# Patient Record
Sex: FEMALE | Race: BLACK OR AFRICAN AMERICAN | Employment: UNEMPLOYED | URBAN - METROPOLITAN AREA
[De-identification: names, ages, dates, MRNs, and addresses within clinical notes are randomized per-mention and may not be internally consistent; named-entity substitution may affect disease eponyms.]

---

## 2017-01-17 ENCOUNTER — OFFICE VISIT (OUTPATIENT)
Dept: ONCOLOGY | Age: 57
End: 2017-01-17

## 2017-01-17 ENCOUNTER — HOSPITAL ENCOUNTER (OUTPATIENT)
Dept: ONCOLOGY | Age: 57
Discharge: HOME | End: 2017-01-17

## 2017-01-17 VITALS
DIASTOLIC BLOOD PRESSURE: 84 MMHG | HEIGHT: 64 IN | BODY MASS INDEX: 42.68 KG/M2 | SYSTOLIC BLOOD PRESSURE: 138 MMHG | WEIGHT: 250 LBS | TEMPERATURE: 98.1 F | HEART RATE: 77 BPM

## 2017-01-17 DIAGNOSIS — C50.911 INVASIVE DUCTAL CARCINOMA OF RIGHT BREAST, STAGE 1 (HCC): Primary | ICD-10-CM

## 2017-01-17 DIAGNOSIS — M19.90 ARTHRITIS: ICD-10-CM

## 2017-01-17 DIAGNOSIS — G62.9 NEUROPATHY: ICD-10-CM

## 2017-01-17 DIAGNOSIS — D05.10 DCIS (DUCTAL CARCINOMA IN SITU) OF BREAST, UNSPECIFIED LATERALITY: ICD-10-CM

## 2017-01-17 DIAGNOSIS — D05.12 DUCTAL CARCINOMA IN SITU (DCIS) OF LEFT BREAST: ICD-10-CM

## 2017-01-17 LAB
BASO+EOS+MONOS # BLD AUTO: 0.2 K/UL (ref 0–2.3)
BASO+EOS+MONOS # BLD AUTO: 3 % (ref 0.1–17)
DIFFERENTIAL METHOD BLD: ABNORMAL
ERYTHROCYTE [DISTWIDTH] IN BLOOD BY AUTOMATED COUNT: 13.6 % (ref 11.5–14.5)
HCT VFR BLD AUTO: 39.8 % (ref 36–48)
HGB BLD-MCNC: 12.6 G/DL (ref 12–16)
LYMPHOCYTES # BLD AUTO: 39 % (ref 14–44)
LYMPHOCYTES # BLD: 2.7 K/UL (ref 1.1–5.9)
MCH RBC QN AUTO: 24 PG (ref 25–35)
MCHC RBC AUTO-ENTMCNC: 31.7 G/DL (ref 31–37)
MCV RBC AUTO: 75.8 FL (ref 78–102)
NEUTS SEG # BLD: 4 K/UL (ref 1.8–9.5)
NEUTS SEG NFR BLD AUTO: 58 % (ref 40–70)
PLATELET # BLD AUTO: 287 K/UL (ref 140–440)
RBC # BLD AUTO: 5.25 M/UL (ref 4.1–5.1)
WBC # BLD AUTO: 6.9 K/UL (ref 4.5–13)

## 2017-01-17 NOTE — MR AVS SNAPSHOT
Visit Information Date & Time Provider Department Dept. Phone Encounter #  
 1/17/2017 11:15 AM Tad Byers 71 Office [phone] Follow-up Instructions Return in about 3 months (around 4/17/2017). Your Appointments 2/1/2017 10:30 AM  
Follow Up with MD JOHN Bravo [facility] ([facility]) Appt Note: ultrasound [address] [facility] 240 [id number] [address] [place] [address] 4/18/2017 10:45 AM  
Office Visit with MD Juan Byers [address] [place] [address] [facility]) Appt Note: OV  
 [facility] [address] [address]  
[phone]  
  
   
 [facility] [address] [facility] [address]  
  
    
  
 3/10/2017 10:00 AM  
TRANSITIONAL CARE MANAGEMENT with Lois Jo MD  
[facility]) Appt Note: 6 month f/up [address] [facility] [address]  
[facility] [address] Upcoming Health Maintenance Date Due Pneumococcal 19-64 Highest Risk (1 of 3 - PCV13) 1/11/1979 DTaP/Tdap/Td series (1 - Tdap) 1/11/1981 BREAST CANCER SCRN MAMMOGRAM 6/10/2016 INFLUENZA AGE 9 TO ADULT 8/1/2016 FOBT Q 1 YEAR AGE 50-75 3/8/2017 PAP AKA CERVICAL CYTOLOGY 12/10/2018 Allergies as of 1/17/2017  Review Complete On: 9/9/2016 By: Lois Jo MD  
 No Known Allergies Current Immunizations  Reviewed on 9/15/2015 Name Date Influenza Vaccine 9/15/2015, 11/18/2014 Not reviewed this visit You Were Diagnosed With   
  
 Codes Comments Invasive ductal carcinoma of right breast, stage 1 ([facility] [address]    -  Primary ICD-10-CM: C50.911 ICD-9-CM: 174.9 Ductal carcinoma in situ (DCIS) of left breast     ICD-10-CM: D05.12 
ICD-9-CM: 233.0 Neuropathy     ICD-10-CM: G62.9 ICD-9-CM: 355.9 Arthritis     ICD-10-CM: M19.90 ICD-9-CM: 716.90 Vitals BP Pulse Temp Height(growth percentile) Weight(growth percentile) BMI  
 138/84 77 98.1 °F (36.7 °C) 5' 4\" (1.626 m) 250 lb (113.4 kg) 42.91 kg/m2 OB Status Smoking Status Postmenopausal Former Smoker BMI and BSA Data Body Mass Index Body Surface Area 42.91 kg/m 2 2.26 m 2 Preferred Pharmacy Pharmacy Name Phone [facility] [address] [place], [address] [place] [address] Avenue [phone] Your Updated Medication List  
  
   
This list is accurate as of: 1/17/17 12:35 PM.  Always use your most recent med list.  
  
  
  
  
 gabapentin 100 mg capsule Commonly known as:  NEURONTIN Take 2 Caps by mouth three (3) times daily. ibuprofen 800 mg tablet Commonly known as:  MOTRIN Take 1 Tab by mouth three (3) times daily as needed for Pain. Iron 325 mg (65 mg iron) tablet Generic drug:  ferrous sulfate Take 325 mg by mouth Daily (before breakfast). Indications: IRON DEFICIENCY ANEMIA  
  
 lovastatin 20 mg tablet Commonly known as:  MEVACOR Take 1 Tab by mouth daily. oxyCODONE-acetaminophen 5-325 mg per tablet Commonly known as:  PERCOCET  
1 - 2 tablets every 4 -6 hours as needed for pain  
  
 triamterene-hydroCHLOROthiazide 37.5-25 mg per capsule Commonly known as:  Rachel Blocker Take 1 Cap by mouth daily. We Performed the Following COMPLETE CBC & AUTO DIFF WBC [76436 CPT(R)] Follow-up Instructions Return in about 3 months (around 4/17/2017). To-Do List   
 01/17/2017 Lab:  CA 27.29   
  
 01/17/2017 Lab:  CBC WITH 3 PART DIFF   
  
 01/17/2017 Lab:  METABOLIC PANEL, COMPREHENSIVE Introducing [facility]! Zehra Mackey introduces [employer] patient portal. Now you can access parts of your medical record, email your doctor's office, and request medication refills online. 1. In your internet browser, go to [email or web address] 2. Click on the First Time User? Click Here link in the Sign In box. You will see the New Member Sign Up page. 3. Enter your [employer] Access Code exactly as it appears below. You will not need to use this code after youve completed the sign-up process. If you do not sign up before the expiration date, you must request a new code. · [employer] Access Code: [email or web address] Expires: 3/5/2017 10:30 AM 
 
4. Enter the last four digits of your Social Security Number (xxxx) and Date of Birth (mm/dd/yyyy) as indicated and click Submit. You will be taken to the next sign-up page. 5. Create a Streamcore Systemt ID. This will be your [employer] login ID and cannot be changed, so think of one that is secure and easy to remember. 6. Create a [employer] password. You can change your password at any time. 7. Enter your Password Reset Question and Answer. This can be used at a later time if you forget your password. 8. Enter your e-mail address. You will receive e-mail notification when new information is available in [address] 9. Click Sign Up. You can now view and download portions of your medical record. 10. Click the Download Summary menu link to download a portable copy of your medical information. If you have questions, please visit the Frequently Asked Questions section of the [employer] website. Remember, [employer] is NOT to be used for urgent needs. For medical emergencies, dial 911. Now available from your iPhone and Android! Please provide this summary of care documentation to your next provider. Your primary care clinician is listed as Aquiles Luong. If you have any questions after today's visit, please call [phone].

## 2017-01-18 LAB
ALBUMIN SERPL-MCNC: 4 G/DL (ref 3.5–5.5)
ALBUMIN/GLOB SERPL: 1.1 {RATIO} (ref 1.1–2.5)
ALP SERPL-CCNC: 83 IU/L (ref 39–117)
ALT SERPL-CCNC: 15 IU/L (ref 0–32)
AST SERPL-CCNC: 20 IU/L (ref 0–40)
BILIRUB SERPL-MCNC: 0.5 MG/DL (ref 0–1.2)
BUN SERPL-MCNC: 12 MG/DL (ref 6–24)
BUN/CREAT SERPL: 12 (ref 9–23)
CALCIUM SERPL-MCNC: 9.6 MG/DL (ref 8.7–10.2)
CANCER AG27-29 SERPL-ACNC: 31.3 U/ML (ref 0–38.6)
CHLORIDE SERPL-SCNC: 97 MMOL/L (ref 96–106)
CO2 SERPL-SCNC: 25 MMOL/L (ref 18–29)
CREAT SERPL-MCNC: 1.03 MG/DL (ref 0.57–1)
GLOBULIN SER CALC-MCNC: 3.8 G/DL (ref 1.5–4.5)
GLUCOSE SERPL-MCNC: 96 MG/DL (ref 65–99)
POTASSIUM SERPL-SCNC: 3.8 MMOL/L (ref 3.5–5.2)
PROT SERPL-MCNC: 7.8 G/DL (ref 6–8.5)
SODIUM SERPL-SCNC: 138 MMOL/L (ref 134–144)

## 2017-02-01 ENCOUNTER — OFFICE VISIT (OUTPATIENT)
Dept: SURGERY | Age: 57
End: 2017-02-01

## 2017-02-01 VITALS
WEIGHT: 252 LBS | RESPIRATION RATE: 18 BRPM | SYSTOLIC BLOOD PRESSURE: 118 MMHG | HEART RATE: 79 BPM | BODY MASS INDEX: 43.02 KG/M2 | TEMPERATURE: 98.1 F | DIASTOLIC BLOOD PRESSURE: 74 MMHG | HEIGHT: 64 IN

## 2017-02-01 DIAGNOSIS — Z86.000 HISTORY OF DUCTAL CARCINOMA IN SITU (DCIS) OF BREAST: ICD-10-CM

## 2017-02-01 DIAGNOSIS — Z85.3 HISTORY OF RIGHT BREAST CANCER: Primary | ICD-10-CM

## 2017-02-01 NOTE — MR AVS SNAPSHOT
Visit Information Date & Time Provider Department Dept. Phone Encounter #  
 2/1/2017 10:30 AM Marianela Bee MD [address] [place] [address] [phone] Your Appointments 4/18/2017 10:45 AM  
Office Visit with Earnstine Bence, MD [address] [place] [address] [facility]) Appt Note: OV  
 [place] [address] [facility] 300 [facility] [address]  
[phone]  
  
   
 [place] [address] [facility] [address]  
  
    
 8/1/2017 11:30 AM  
Follow Up with Marianela Bee MD  
[address] [place] [address] ([facility]) Appt Note: 6mth F/U  
 [address] [place] [address] [facility] [address] [address]  
  
    
  
 3/10/2017 10:00 AM  
TRANSITIONAL CARE MANAGEMENT [facility] Appt Note: 6 month f/up [address] [facility] [address]  
[facility] [address] Upcoming Health Maintenance Date Due Pneumococcal 19-64 Highest Risk (1 of 3 - PCV13) 1/11/1979 DTaP/Tdap/Td series (1 - Tdap) 1/11/1981 BREAST CANCER SCRN MAMMOGRAM 6/10/2016 INFLUENZA AGE 9 TO ADULT 8/1/2016 FOBT Q 1 YEAR AGE 50-75 3/8/2017 PAP AKA CERVICAL CYTOLOGY 12/10/2018 Allergies as of 2/1/2017  Review Complete On: 2/1/2017 By: Marianela Bee MD  
 No Known Allergies Current Immunizations  Reviewed on 9/15/2015 Name Date Influenza Vaccine 9/15/2015, 11/18/2014 Not reviewed this visit Vitals BP Pulse Temp Resp Height(growth percentile) Weight(growth percentile) 118/74 79 98.1 °F (36.7 °C) (Oral) 18 5' 4\" (1.626 m) 252 lb (114.3 kg) BMI OB Status Smoking Status 43.26 kg/m2 Postmenopausal Former Smoker Vitals History BMI and BSA Data Body Mass Index Body Surface Area 43.26 kg/m 2 2.27 m 2 Preferred Pharmacy Pharmacy Name Phone [facility] [address] [phone] Your Updated Medication List  
  
   
This list is accurate as of: 2/1/17 11:33 AM.  Always use your most recent med list.  
  
  
  
  
 gabapentin 100 mg capsule Commonly known as:  NEURONTIN Take 2 Caps by mouth three (3) times daily. ibuprofen 800 mg tablet Commonly known as:  MOTRIN Take 1 Tab by mouth three (3) times daily as needed for Pain. Iron 325 mg (65 mg iron) tablet Generic drug:  ferrous sulfate Take 325 mg by mouth Daily (before breakfast). Indications: IRON DEFICIENCY ANEMIA  
  
 lovastatin 20 mg tablet Commonly known as:  MEVACOR Take 1 Tab by mouth daily. oxyCODONE-acetaminophen 5-325 mg per tablet Commonly known as:  PERCOCET  
1 - 2 tablets every 4 -6 hours as needed for pain  
  
 triamterene-hydroCHLOROthiazide 37.5-25 mg per capsule Commonly known as:  [place] Take 1 Cap by mouth daily. Introducing [facility]! Bell Rea introduces [employer] patient portal. Now you can access parts of your medical record, email your doctor's office, and request medication refills online. 1. In your internet browser, go to [email or web address] 2. Click on the First Time User? Click Here link in the Sign In box. You will see the New Member Sign Up page. 3. Enter your [employer] Access Code exactly as it appears below. You will not need to use this code after youve completed the sign-up process. If you do not sign up before the expiration date, you must request a new code. · [employer] Access Code: [email or web address] Expires: 3/5/2017 10:30 AM 
 
4. Enter the last four digits of your Social Security Number (xxxx) and Date of Birth (mm/dd/yyyy) as indicated and click Submit. You will be taken to the next sign-up page. 5. Create a Ele.me ID. This will be your Ele.me login ID and cannot be changed, so think of one that is secure and easy to remember. 6. Create a Ele.me password. You can change your password at any time. 7. Enter your Password Reset Question and Answer. This can be used at a later time if you forget your password. 8. Enter your e-mail address. You will receive e-mail notification when new information is available in [address] 9. Click Sign Up. You can now view and download portions of your medical record. 10. Click the Download Summary menu link to download a portable copy of your medical information. If you have questions, please visit the Frequently Asked Questions section of the Ele.me website. Remember, [employer]me is NOT to be used for urgent needs. For medical emergencies, dial 911. Now available from your iPhone and Android! Please provide this summary of care documentation to your next provider. Your primary care clinician is listed as Mariely Baptiste. If you have any questions after today's visit, please call [phone].

## 2017-02-01 NOTE — PROGRESS NOTES
John Ramirez M.D. FACS  PROGRESS NOTE    Name: Varun Kelly MRN: [id number]   : 1960 [facility]   Date: 2017 Admission Date: No admission date for patient encounter. Subjective:  Patient returns today 2-1/2 years out from bilateral mastectomies with immediate reconstruction for right breast cancer and left breast DCIS. The expanders became infected and had to be removed. The patient then opted to go without further reconstruction. She has completed adjuvant chemotherapy. She denies any unintentional weight loss, chest pain, new cough or difficulties breathing or abdominal pain since her last visit. She does have some soreness in the right latissimus dorsal muscle. Objective:  Vitals:    17 1101   BP: 118/74   Pulse: 79   Resp: 18   Temp: 98.1 °F (36.7 °C)   TempSrc: Oral   Weight: 114.3 kg (252 lb)   Height: 5' 4\" (1.626 m)       Physical Exam:    General: in no apparent distress    BREASTS:    Left:  No dimpling, discoloration, nipple inversion or retractions. No axillary or supraclavicular lymphadenopathy. No mass   Right:  No dimpling, discoloration, nipple inversion or retractions. No axillary or supraclavicular lymphadenopathy. No mass      Labs:  No results found for this or any previous visit (from the past 24 hour(s)). Current Medications:  Current Outpatient Prescriptions   Medication Sig Dispense Refill    triamterene-hydroCHLOROthiazide (DYAZIDE) 37.5-25 mg per capsule Take 1 Cap by mouth daily. 90 Cap 1    lovastatin (MEVACOR) 20 mg tablet Take 1 Tab by mouth daily. 90 Tab 1    oxyCODONE-acetaminophen (PERCOCET) 5-325 mg per tablet 1 - 2 tablets every 4 -6 hours as needed for pain 120 Tab 0    gabapentin (NEURONTIN) 100 mg capsule Take 2 Caps by mouth three (3) times daily. 180 Cap 11    ibuprofen (MOTRIN) 800 mg tablet Take 1 Tab by mouth three (3) times daily as needed for Pain.  40 Tab 0    ferrous sulfate (IRON) 325 mg (65 mg iron) tablet Take 325 mg by mouth Daily (before breakfast). Indications: IRON DEFICIENCY ANEMIA         Chart and notes reviewed. Data reviewed. I have evaluated and examined the patient. IMPRESSION:   · Patient was stage II right breast cancer and stage 0 left breast cancer status post bilateral mastectomies adjuvant chemotherapy. No new findings on exam or in her history and review. PLAN:/DISCUSION:   · We will continue to see the patient every 6 months until she has completed 5 years follow-up.   · Follow-up in 6 months         Pascale Cosme MD

## 2017-03-10 ENCOUNTER — OFFICE VISIT (OUTPATIENT)
Dept: FAMILY MEDICINE CLINIC | Age: 57
End: 2017-03-10

## 2017-03-10 VITALS
WEIGHT: 252 LBS | OXYGEN SATURATION: 96 % | TEMPERATURE: 98.4 F | HEART RATE: 69 BPM | DIASTOLIC BLOOD PRESSURE: 81 MMHG | SYSTOLIC BLOOD PRESSURE: 123 MMHG | BODY MASS INDEX: 43.02 KG/M2 | HEIGHT: 64 IN | RESPIRATION RATE: 18 BRPM

## 2017-03-10 DIAGNOSIS — I10 ESSENTIAL HYPERTENSION: Primary | ICD-10-CM

## 2017-03-10 DIAGNOSIS — E78.2 MIXED HYPERLIPIDEMIA: Chronic | ICD-10-CM

## 2017-03-10 DIAGNOSIS — I10 ESSENTIAL HYPERTENSION: ICD-10-CM

## 2017-03-10 NOTE — PROGRESS NOTES
Chronic Illness Visit    Today's Date:  3/10/2017   Patient's Name: Jami Dooley   Patient's :  1960     History:     Chief Complaint   Patient presents with    Follow Up Chronic Condition     HTN and Cholesterol     Medication Refill     All medications        Jami Dooley is a 62 y.o. female presenting for a follow up of Chronic Illness. Hypertension/Hyperlipidemia    BP is well controlled today <140/80. Patients risk factors include: Obesity   Patient is not checking home BP   Reports compliance and denies side effects to medications   Daily exercise and diet are as follows: reports eating very little but is walking a couple of days a wekk  Weight is stable since the last visit  Takes the below meds regularly with no side effects.    Last LDL: 125 ()      Past Medical History:   Diagnosis Date    [facility])     right breast cancer     Hypercholesterolemia     Hypertension      Past Surgical History:   Procedure Laterality Date    HX BREAST RECONSTRUCTION  2014    BILATERAL BREAST RECONSTRUCTION WITH TISSUE EXPANDERS [facility]     [address]      HX CHOLECYSTECTOMY      HX GYN      fibroid tumors removed    HX MASTECTOMY  2014    BILATERAL BREAST MASTECTOMY SIMPLE [facility]    HX TUBAL LIGATION      HX VASCULAR ACCESS      left chest     Social History     Social History    Marital status:      Spouse name: N/A    Number of children: N/A    Years of education: N/A     Social History Main Topics    Smoking status: Former Smoker     Types: Cigarettes    Smokeless tobacco: Never Used      Comment: quit 2014    Alcohol use Yes      Comment: Socially    Drug use: No    Sexual activity: Not Asked     Other Topics Concern    None     Social History Narrative     Family History   Problem Relation Age of Onset    Hypertension Mother     Diabetes Father     Diabetes Daughter     Diabetes Son      No Known Allergies    Problem List:      Patient Active Problem List   Diagnosis Code    HTN (hypertension) I10    Hyperlipidemia E78.5    Breast lump N63    Abnormal finding on mammography, microcalcification R92.0    DCIS (ductal carcinoma in situ) of breast D05.10    Breast cancer ([facility] [address] C50.919    Status post partial mastectomy of both breasts Z90.13    Cellulitis L03.90    Back pain M54.9    Anemia D64.9    Mucositis K12.30    Arthritis M19.90    Neuropathy G62.9    Chemotherapy-induced peripheral neuropathy (HCC) G62.0, T45.1X5A    Ductal carcinoma in situ (DCIS) of left breast D05.12    Invasive ductal carcinoma of right breast, stage 1 (HCC) C50.911       Medications:     Current Outpatient Prescriptions   Medication Sig    triamterene-hydroCHLOROthiazide (DYAZIDE) 37.5-25 mg per capsule Take 1 Cap by mouth daily.  lovastatin (MEVACOR) 20 mg tablet Take 1 Tab by mouth daily.  oxyCODONE-acetaminophen (PERCOCET) 5-325 mg per tablet 1 - 2 tablets every 4 -6 hours as needed for pain    gabapentin (NEURONTIN) 100 mg capsule Take 2 Caps by mouth three (3) times daily.  ibuprofen (MOTRIN) 800 mg tablet Take 1 Tab by mouth three (3) times daily as needed for Pain.  ferrous sulfate (IRON) 325 mg (65 mg iron) tablet Take 325 mg by mouth Daily (before breakfast). Indications: IRON DEFICIENCY ANEMIA     No current facility-administered medications for this visit.           Constitutional: negative for fevers, chills, sweats and fatigue  Respiratory: negative for cough, sputum or wheezing  Cardiovascular: negative for chest pain, chest pressure/discomfort, dyspnea  Gastrointestinal: negative for nausea, vomiting, diarrhea, constipation and abdominal pain  Musculoskeletal:positive for arthralgias, negative for myalgias  Neurological: negative for headaches and dizziness  Behavioral/Psych: negative for anxiety and depression    Physical Assessment:   VS:    Visit Vitals    /81    Pulse 69    Temp 98.4 °F (36.9 °C) (Oral)    Resp 18    Ht 5' 4\" (1.626 m)    Wt 252 lb (114.3 kg)    SpO2 96%    BMI 43.26 kg/m2       Lab Results   Component Value Date/Time    Sodium 138 01/17/2017 12:40 PM    Potassium 3.8 01/17/2017 12:40 PM    Chloride 97 01/17/2017 12:40 PM    CO2 25 01/17/2017 12:40 PM    Anion gap 8 02/10/2015 02:20 PM    Glucose 96 01/17/2017 12:40 PM    BUN 12 01/17/2017 12:40 PM    Creatinine 1.03 01/17/2017 12:40 PM    BUN/Creatinine ratio 12 01/17/2017 12:40 PM    GFR est AA 70 01/17/2017 12:40 PM    GFR est non-AA 60 01/17/2017 12:40 PM    Calcium 9.6 01/17/2017 12:40 PM       General:   Well-groomed, obese, in no distress, pleasant, alert, appropriate and conversant. Mouth:  Good dentition, oropharynx WNL without membranes, exudates, petechiae or ulcers  Cardiovasc:   RRR, no MRG. Pulses 2+ and symmetric at distal extremities. Pulmonary:   Lungs clear bilaterally. Normal respiratory effort. Extremities:   no edema on the left ankle, no TTP bilateral calves. LEs warm and well-perfused. Neuro:   Alert and oriented, no focal deficits. No facial asymmetry noted. Psych:  No pressured speech or abnormal thought content        Assessment/Plan & Orders:       1. Essential hypertension    2. Mixed hyperlipidemia        Orders Placed This Encounter    LIPID PANEL       Hypertension   -BP is well controlled would like patient to continue current meds. Hyperlipidemia  -stable continue w/ statin. Reiterated the importance of patient completing Lipid panel. Labs ordered once again today.     Obesity stable since the last visit  counseled on obesity, risks of being obese, commercial diet plans, calorie counting, food journaling, exercise, follow up for weight checks       Follow up in 3-6 months    Bennett Powell MD  Family Medicine  3/10/2017  10:03 AM

## 2017-03-10 NOTE — PATIENT INSTRUCTIONS

## 2017-03-10 NOTE — MR AVS SNAPSHOT
Visit Information Date & Time Provider Department Dept. Phone Encounter #  
 3/10/2017 10:00 AM Anders Carbajal, [facility] [phone] [id number] Your Appointments 4/18/2017 10:45 AM  
Office Visit with MD Juan Santillan [address] Mehran Torres) Appt Note: OV  
 [place] [address] [address]  
[phone]  
  
   
 [address] [address] [facility] [address]  
  
    
 8/1/2017 11:30 AM  
Follow Up with MD JOHN Beauchamp [facility] (Mehran Torres) Appt Note: 6mth F/U  
 [address] [address]  
  
    
 9/29/2017 10:00 AM  
FOLLOW UP EXAM with Anders Carbajal MD  
[facility] Mehran Torres) Appt Note: 6 month f/u  
 [address] [facility] [address]  
[facility] [address] Upcoming Health Maintenance Date Due Pneumococcal 19-64 Highest Risk (1 of 3 - PCV13) 1/11/1979 DTaP/Tdap/Td series (1 - Tdap) 1/11/1981 FOBT Q 1 YEAR AGE 50-75 3/8/2017 PAP AKA CERVICAL CYTOLOGY 12/10/2018 BREAST CANCER SCRN MAMMOGRAM 3/10/2019 Allergies as of 3/10/2017  Review Complete On: 3/10/2017 By: Anders Carbajal MD  
 No Known Allergies Current Immunizations  Reviewed on 9/15/2015 Name Date Influenza Vaccine 9/15/2015, 11/18/2014 Not reviewed this visit You Were Diagnosed With   
  
 Codes Comments Essential hypertension    -  Primary ICD-10-CM: I10 
ICD-9-CM: 401.9 Mixed hyperlipidemia     ICD-10-CM: E78.2 ICD-9-CM: 272.2 Vitals BP Pulse Temp Resp Height(growth percentile) Weight(growth percentile) 123/81 69 98.4 °F (36.9 °C) (Oral) 18 5' 4\" (1.626 m) 252 lb (114.3 kg) SpO2 BMI OB Status Smoking Status 96% 43.26 kg/m2 Postmenopausal Former Smoker Vitals History BMI and BSA Data Body Mass Index Body Surface Area  
 43.26 kg/m 2 2.27 m 2 Preferred Pharmacy Pharmacy Name Phone [facility] [address] [phone] Your Updated Medication List  
  
   
This list is accurate as of: 3/10/17 10:25 AM.  Always use your most recent med list.  
  
  
  
  
 gabapentin 100 mg capsule Commonly known as:  NEURONTIN Take 2 Caps by mouth three (3) times daily. ibuprofen 800 mg tablet Commonly known as:  MOTRIN Take 1 Tab by mouth three (3) times daily as needed for Pain. Iron 325 mg (65 mg iron) tablet Generic drug:  ferrous sulfate Take 325 mg by mouth Daily (before breakfast). Indications: IRON DEFICIENCY ANEMIA  
  
 lovastatin 20 mg tablet Commonly known as:  MEVACOR Take 1 Tab by mouth daily. oxyCODONE-acetaminophen 5-325 mg per tablet Commonly known as:  PERCOCET  
1 - 2 tablets every 4 -6 hours as needed for pain  
  
 triamterene-hydroCHLOROthiazide 37.5-25 mg per capsule Commonly known as:  Jaycee Marcel Take 1 Cap by mouth daily. To-Do List   
 03/10/2017 Lab:  LIPID PANEL Patient Instructions High Cholesterol: Care Instructions Your Care Instructions Cholesterol is a type of fat in your blood. It is needed for many body functions, such as making new cells. Cholesterol is made by your body. It also comes from food you eat. High cholesterol means that you have too much of the fat in your blood. This raises your risk of a heart attack and stroke. LDL and HDL are part of your total cholesterol. LDL is the \"bad\" cholesterol. High LDL can raise your risk for heart disease, heart attack, and stroke. HDL is the \"good\" cholesterol. It helps clear bad cholesterol from the body. High HDL is linked with a lower risk of heart disease, heart attack, and stroke. Your cholesterol levels help your doctor find out your risk for having a heart attack or stroke. You and your doctor can talk about whether you need to lower your risk and what treatment is best for you. A heart-healthy lifestyle along with medicines can help lower your cholesterol and your risk. The way you choose to lower your risk will depend on how high your risk is for heart attack and stroke. It will also depend on how you feel about taking medicines. Follow-up care is a key part of your treatment and safety. Be sure to make and go to all appointments, and call your doctor if you are having problems. It's also a good idea to know your test results and keep a list of the medicines you take. How can you care for yourself at home? · Eat a variety of foods every day. Good choices include fruits, vegetables, whole grains (like oatmeal), dried beans and peas, nuts and seeds, soy products (like tofu), and fat-free or low-fat dairy products. · Replace butter, margarine, and hydrogenated or partially hydrogenated oils with olive and canola oils. (Canola oil margarine without trans fat is fine.) · Replace red meat with fish, poultry, and soy protein (like tofu). · Limit processed and packaged foods like chips, crackers, and cookies. · Bake, broil, or steam foods. Don't silverio them. · Be physically active. Get at least 30 minutes of exercise on most days of the week. Walking is a good choice. You also may want to do other activities, such as running, swimming, cycling, or playing tennis or team sports. · Stay at a healthy weight or lose weight by making the changes in eating and physical activity listed above. Losing just a small amount of weight, even 5 to 10 pounds, can reduce your risk for having a heart attack or stroke. · Do not smoke. When should you call for help? Watch closely for changes in your health, and be sure to contact your doctor if: 
· You need help making lifestyle changes. · You have questions about your medicine. Where can you learn more? Go to http://hanna-dharmesh.info/. Enter Y827 in the search box to learn more about \"High Cholesterol: Care Instructions. \" Current as of: January 27, 2016 Content Version: 11.1 © 5740-4035 [employer]. Care instructions adapted under license by [employer] (which disclaims liability or warranty for this information). If you have questions about a medical condition or this instruction, always ask your healthcare professional. [address] any warranty or liability for your use of this information. Introducing [facility]! [address] introduces X[employer] patient portal. Now you can access parts of your medical record, email your doctor's office, and request medication refills online. 1. In your internet browser, go to [email or web address] 2. Click on the First Time User? Click Here link in the Sign In box. You will see the New Member Sign Up page. 3. Enter your Xignite Access Code exactly as it appears below. You will not need to use this code after youve completed the sign-up process. If you do not sign up before the expiration date, you must request a new code. · Xignite Access Code: [email or web address] Expires: 6/8/2017 10:25 AM 
 
4. Enter the last four digits of your Social Security Number (xxxx) and Date of Birth (mm/dd/yyyy) as indicated and click Submit. You will be taken to the next sign-up page. 5. Create a Xignite ID. This will be your Xignite login ID and cannot be changed, so think of one that is secure and easy to remember. 6. Create a Xignite password. You can change your password at any time. 7. Enter your Password Reset Question and Answer. This can be used at a later time if you forget your password. 8. Enter your e-mail address. You will receive e-mail notification when new information is available in [address] 9. Click Sign Up. You can now view and download portions of your medical record. 10. Click the Download Summary menu link to download a portable copy of your medical information. If you have questions, please visit the Frequently Asked Questions section of the [employer] website. Remember, [employer] is NOT to be used for urgent needs. For medical emergencies, dial 911. Now available from your iPhone and Android! Please provide this summary of care documentation to your next provider. Your primary care clinician is listed as Shahriar Setting. If you have any questions after today's visit, please call [phone].

## 2017-03-14 RX ORDER — GABAPENTIN 100 MG/1
200 CAPSULE ORAL 3 TIMES DAILY
Qty: 180 CAP | Refills: 11 | Status: SHIPPED | OUTPATIENT
Start: 2017-03-14 | End: 2017-03-23 | Stop reason: SDUPTHER

## 2017-03-17 RX ORDER — GABAPENTIN 100 MG/1
CAPSULE ORAL
Qty: 180 CAP | Refills: 0 | Status: SHIPPED | OUTPATIENT
Start: 2017-03-17 | End: 2017-06-13 | Stop reason: SDUPTHER

## 2017-03-23 RX ORDER — GABAPENTIN 100 MG/1
200 CAPSULE ORAL 3 TIMES DAILY
Qty: 180 CAP | Refills: 11 | Status: SHIPPED | OUTPATIENT
Start: 2017-03-23 | End: 2018-04-11 | Stop reason: SDUPTHER

## 2017-05-02 ENCOUNTER — HOSPITAL ENCOUNTER (OUTPATIENT)
Dept: ONCOLOGY | Age: 57
Discharge: HOME | End: 2017-05-02

## 2017-05-02 ENCOUNTER — OFFICE VISIT (OUTPATIENT)
Dept: ONCOLOGY | Age: 57
End: 2017-05-02

## 2017-05-02 VITALS
HEART RATE: 77 BPM | BODY MASS INDEX: 42.68 KG/M2 | DIASTOLIC BLOOD PRESSURE: 87 MMHG | WEIGHT: 250 LBS | HEIGHT: 64 IN | TEMPERATURE: 98.2 F | SYSTOLIC BLOOD PRESSURE: 153 MMHG

## 2017-05-02 DIAGNOSIS — M19.90 ARTHRITIS: ICD-10-CM

## 2017-05-02 DIAGNOSIS — C50.911 INVASIVE DUCTAL CARCINOMA OF RIGHT BREAST, STAGE 1 (HCC): ICD-10-CM

## 2017-05-02 DIAGNOSIS — C50.919 MALIGNANT NEOPLASM OF FEMALE BREAST, UNSPECIFIED LATERALITY, UNSPECIFIED SITE OF BREAST: Primary | ICD-10-CM

## 2017-05-02 DIAGNOSIS — C50.919 MALIGNANT NEOPLASM OF FEMALE BREAST, UNSPECIFIED LATERALITY, UNSPECIFIED SITE OF BREAST: ICD-10-CM

## 2017-05-02 LAB
BASO+EOS+MONOS # BLD AUTO: 0.3 K/UL (ref 0–2.3)
BASO+EOS+MONOS # BLD AUTO: 6 % (ref 0.1–17)
DIFFERENTIAL METHOD BLD: ABNORMAL
ERYTHROCYTE [DISTWIDTH] IN BLOOD BY AUTOMATED COUNT: 13.3 % (ref 11.5–14.5)
HCT VFR BLD AUTO: 39 % (ref 36–48)
HGB BLD-MCNC: 12.3 G/DL (ref 12–16)
LYMPHOCYTES # BLD AUTO: 39 % (ref 14–44)
LYMPHOCYTES # BLD: 2.4 K/UL (ref 1.1–5.9)
MCH RBC QN AUTO: 23.6 PG (ref 25–35)
MCHC RBC AUTO-ENTMCNC: 31.5 G/DL (ref 31–37)
MCV RBC AUTO: 74.7 FL (ref 78–102)
NEUTS SEG # BLD: 3.5 K/UL (ref 1.8–9.5)
NEUTS SEG NFR BLD AUTO: 55 % (ref 40–70)
PLATELET # BLD AUTO: 261 K/UL (ref 140–440)
RBC # BLD AUTO: 5.22 M/UL (ref 4.1–5.1)
WBC # BLD AUTO: 6.2 K/UL (ref 4.5–13)

## 2017-05-02 RX ORDER — OXYCODONE AND ACETAMINOPHEN 5; 325 MG/1; MG/1
TABLET ORAL
Qty: 120 TAB | Refills: 0 | Status: SHIPPED | OUTPATIENT
Start: 2017-05-02 | End: 2017-05-22

## 2017-05-02 NOTE — PATIENT INSTRUCTIONS
Breast Cancer: Care Instructions  Your Care Instructions  Breast cancer occurs when abnormal cells grow out of control in the breast. These cancer cells can spread within the breast, to nearby lymph nodes and other tissues, and to other parts of the body. Being treated for cancer can weaken your body, and you may feel very tired. Get the rest your body needs so you can feel better. Finding out that you have cancer is scary. You may feel many emotions and may need some help coping. Seek out family, friends, and counselors for support. You also can do things at home to make yourself feel better while you go through treatment. Call the [employer] ([phone]) or visit its website at [email or web address] (QEEP) for more information. Follow-up care is a key part of your treatment and safety. Be sure to make and go to all appointments, and call your doctor if you are having problems. It's also a good idea to know your test results and keep a list of the medicines you take. How can you care for yourself at home? · Take your medicines exactly as prescribed. Call your doctor if you think you are having a problem with your medicine. You may get medicine for nausea and vomiting if you have these side effects. · Follow your doctor's instructions to relieve pain. Pain from cancer and surgery can almost always be controlled. Use pain medicine when you first notice pain, before it becomes severe. · Eat healthy food. If you do not feel like eating, try to eat food that has protein and extra calories to keep up your strength and prevent weight loss. Drink liquid meal replacements for extra calories and protein. Try to eat your main meal early. · Get some physical activity every day, but do not get too tired. Keep doing the hobbies you enjoy as your energy allows. · Do not smoke. Smoking can make your cancer worse. If you need help quitting, talk to your doctor about stop-smoking programs and medicines.  These can increase your chances of quitting for good. · Take steps to control your stress and workload. Learn relaxation techniques. ¨ Share your feelings. Stress and tension affect our emotions. By expressing your feelings to others, you may be able to understand and cope with them. ¨ Consider joining a support group. Talking about a problem with your spouse, a good friend, or other people with similar problems is a good way to reduce tension and stress. ¨ Express yourself through art. Try writing, crafts, dance, or art to relieve stress. Some dance, writing, or art groups may be available just for people who have cancer. ¨ Be kind to your body and mind. Getting enough sleep, eating a healthy diet, and taking time to do things you enjoy can contribute to an overall feeling of balance in your life and can help reduce stress. ¨ Get help if you need it. Discuss your concerns with your doctor or counselor. · If you are vomiting or have diarrhea:  ¨ Drink plenty of fluids (enough so that your urine is light yellow or clear like water) to prevent dehydration. Choose water and other caffeine-free clear liquids. If you have kidney, heart, or liver disease and have to limit fluids, talk with your doctor before you increase the amount of fluids you drink. ¨ When you are able to eat, try clear soups, mild foods, and liquids until all symptoms are gone for 12 to 48 hours. Other good choices include dry toast, crackers, cooked cereal, and gelatin dessert, such as Jell-O.  · If you have not already done so, prepare a list of advance directives. Advance directives are instructions to your doctor and family members about what kind of care you want if you become unable to speak or express yourself. When should you call for help? Call your doctor now or seek immediate medical care if:  · You have a fever. · Any part of your breast becomes red, tender, swollen, or hot.   · You have pain, redness, or swelling in the arm on the same side as your breast cancer. Watch closely for changes in your health, and be sure to contact your doctor if:  · You have pain that is not controlled by medicine. · You have nausea or vomiting. · You are constipated or have diarrhea. Where can you learn more? Go to http://hanna-dharmesh.info/. Enter V321 in the search box to learn more about \"Breast Cancer: Care Instructions. \"  Current as of: July 26, 2016  Content Version: 11.2  © 4226-8676 [employer]. Care instructions adapted under license by [employer] (which disclaims liability or warranty for this information). If you have questions about a medical condition or this instruction, always ask your healthcare professional. [address] any warranty or liability for your use of this information.

## 2017-05-02 NOTE — PROGRESS NOTES
Hematology/Oncology  Progress Note    Name: Ricky Hoffmann  Date: 2017  : 1960    Kerry Gar MD     Ms. Dahlia Sheth is a 62 y.o. 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  Current therapy:the patient has previously completed adjuvant systemic chemotherapy      Subjective:     Ms. Dahlia Sheth is a 51-year-old  woman who has a history of DCIS involving the left breast an invasive ductal adenocarcinoma the right breast. The patient has done well since she completed her systemic chemotherapy. She has previous undergone bilateral mastectomies. She decided not to proceed with this reconstruction. She has no physical complaints at this time. Past medical history, family history, and social history: these were reviewed and remains unchanged. Past Medical History:   Diagnosis Date    Cancer [facility])     right breast cancer     Hypercholesterolemia     Hypertension      Past Surgical History:   Procedure Laterality Date    HX BREAST RECONSTRUCTION  2014    BILATERAL BREAST RECONSTRUCTION WITH TISSUE EXPANDERS performed by Dwayne Sandoval MD at [facility] MAIN OR    HX  SECTION      HX CHOLECYSTECTOMY      HX GYN      fibroid tumors removed    HX MASTECTOMY  2014    BILATERAL BREAST MASTECTOMY SIMPLE performed by Pricila Dooley MD at [facility] MAIN OR    HX TUBAL LIGATION      HX VASCULAR ACCESS      left chest     Social History     Social History    Marital status:      Spouse name: N/A    Number of children: N/A    Years of education: N/A     Occupational History    Not on file.      Social History Main Topics    Smoking status: Former Smoker     Types: Cigarettes    Smokeless tobacco: Never Used      Comment: quit 2014    Alcohol use Yes      Comment: Socially    Drug use: No    Sexual activity: Not on file     Other Topics Concern    Not on file     Social History Narrative     Family History Problem Relation Age of Onset    Hypertension Mother     Diabetes Father     Diabetes Daughter     Diabetes Son      Current Outpatient Prescriptions   Medication Sig Dispense Refill    gabapentin (NEURONTIN) 100 mg capsule Take 2 Caps by mouth three (3) times daily. 180 Cap 11    gabapentin (NEURONTIN) 100 mg capsule TAKE TWO CAPSULES BY MOUTH THREE TIMES DAILY 180 Cap 0    triamterene-hydroCHLOROthiazide (DYAZIDE) 37.5-25 mg per capsule Take 1 Cap by mouth daily. 90 Cap 1    lovastatin (MEVACOR) 20 mg tablet Take 1 Tab by mouth daily. 90 Tab 1    oxyCODONE-acetaminophen (PERCOCET) 5-325 mg per tablet 1 - 2 tablets every 4 -6 hours as needed for pain 120 Tab 0    ibuprofen (MOTRIN) 800 mg tablet Take 1 Tab by mouth three (3) times daily as needed for Pain. 40 Tab 0    ferrous sulfate (IRON) 325 mg (65 mg iron) tablet Take 325 mg by mouth Daily (before breakfast). Indications: IRON DEFICIENCY ANEMIA         Review of Systems  Constitutional: The patient has no acute distress or discomfort. HEENT: The patient denies recent head trauma, eye pain, blurred vision,  hearing deficit, oropharyngeal mucosal pain or lesions, and the patient denies throat pain or discomfort. Lymphatics: The patient denies palpable peripheral lymphadenopathy. Hematologic: The patient denies having bruising, bleeding, or progressive fatigue. Respiratory: Patient denies having shortness of breath, cough, sputum production, fever, or dyspnea on exertion. Cardiovascular: The patient denies having leg pain, leg swelling, heart palpitations, chest permit, chest pain, or lightheadedness. The patient denies having dyspnea on exertion. Gastrointestinal: The patient denies having nausea, emesis, or diarrhea. The patient denies having any hematemesis or blood in the stool. Genitourinary: Patient denies having urinary urgency, frequency, or dysuria. The patient denies having blood in the urine.   Psychological: The patient denies having symptoms of nervousness, anxiety, depression, or thoughts of harming self. Skin: Patient denies having skin rashes, skin, ulcerations, or unexplained itching or pruritus. Musculoskeletal: The patient denies having pain in the joints or bones. Objective:     Visit Vitals    /87    Pulse 77    Temp 98.2 °F (36.8 °C)    Wt 113.4 kg (250 lb)    BMI 42.91 kg/m2     ECOG PS=0, pain score=0/10   Physical Exam:   Gen. Appearance: The patient is in no acute distress. Skin: There is no bruise or rash. HEENT: The exam is unremarkable. Neck: Supple without lymphadenopathy or thyromegaly. Lungs: Clear to auscultation and percussion; there are no wheezes or rhonchi. Heart: Regular rate and rhythm; there are no murmurs, gallops, or rubs. Chest Wall and Axilla: No palpable masses, no evidence of disease recurrence. Abdomen: Bowel sounds are present and normal.  There is no guarding, tenderness, or hepatosplenomegaly. Extremities: There is no clubbing, cyanosis, or edema. Neurologic: There are no focal neurologic deficits. Lymphatics: There is no palpable peripheral lymphadenopathy. Musculoskeletal: The patient has full range of motion at all joints. There is no evidence of joint deformity or effusions. There is no focal joint tenderness. Psychological/psychiatric: There is no clinical evidence of anxiety, depression, or melancholy.     Lab data:      Results for orders placed or performed during the hospital encounter of 05/02/17   CBC WITH 3 PART DIFF     Status: Abnormal   Result Value Ref Range Status    WBC 6.2 4.5 - 13.0 K/uL Final    RBC 5.22 (H) 4.10 - 5.10 M/uL Final    HGB 12.3 12.0 - 16.0 g/dL Final    HCT 39.0 36 - 48 % Final    MCV 74.7 (L) 78 - 102 FL Final    MCH 23.6 (L) 25.0 - 35.0 PG Final    MCHC 31.5 31 - 37 g/dL Final    RDW 13.3 11.5 - 14.5 % Final    PLATELET 804 835 - 074 K/uL Final    NEUTROPHILS 55 40 - 70 % Final    MIXED CELLS 6 0.1 - 17 % Final    LYMPHOCYTES 39 14 - 44 % Final    ABS. NEUTROPHILS 3.5 1.8 - 9.5 K/UL Final    ABS. MIXED CELLS 0.3 0.0 - 2.3 K/uL Final    ABS. LYMPHOCYTES 2.4 1.1 - 5.9 K/UL Final     Comment: Test performed at [place] [address] Location. Results Reviewed by Medical Director. DF AUTOMATED   Final           Assessment:     1. Malignant neoplasm of female breast, unspecified laterality, unspecified site of breast ([address]    2. Invasive ductal carcinoma of right breast, stage 1 (HCC)    3. Arthritis      Plan:   DCIS left breast/invasive ductal carcinoma right breast the patient is doing well and clinically there is no evidence of disease recurrence. Have instructed the patient to continue doing her chest wall and axilla exam on a monthly basis. Chemotherapy-induced peripheral neuropathy: I have instructed the patient to continue taking the Neurontin 200 mg 3 times daily. She also takes Percocet 5 mg every 4-6 hours p.r.n. I will renew this Rx today. Additionally, she takes Motrin 800 mg 3 times daily with food. She will continue to take these medications as previously noted. Arthritis: I have instructed the patient to continue using her Motrin and exercises as needed for arthritis. She also takes the Percocet 5 mg every 4-6 hours as well and this offers some benefit. Follow-up in 3 months  Orders Placed This Encounter    COMPLETE CBC & AUTO DIFF WBC    InHouse CBC ([employer])     Standing Status:   Future     Number of Occurrences:   1     Standing Expiration Date:   5/9/2017       Geovanna Farrar MD  5/2/2017      Please note: This document has been produced using voice recognition software. Unrecognized errors in transcription may be present.

## 2017-05-02 NOTE — MR AVS SNAPSHOT
Visit Information Date & Time Provider Department Dept. Phone Encounter #  
 5/2/2017  3:15 PM Rakesh Chaudhary [address] Office [phone] [id number] Follow-up Instructions Return in about 3 months (around 8/2/2017). Your Appointments 8/1/2017 11:30 AM  
Follow Up with MD JOHN [facility] ([facility]) Appt Note: 6mth F/U  
 [address] [place] [address] [facility] [address] [id number]  
  
    
 8/8/2017 10:15 AM  
Office Visit with Rakesh Chaudhary MD  
[address] ([facility]) Appt Note: OV  
 [facility] [address] [facility] [address] [facility] [address]  
[phone]  
  
   
 [facility] [address]  
  
    
 9/29/2017 10:00 AM  
FOLLOW UP EXAM with Geneva Nance MD  
[facility]) Appt Note: 6 month f/u  
 [address] [facility] [address]  
[facility] [address] Upcoming Health Maintenance Date Due Pneumococcal 19-64 Highest Risk (1 of 3 - PCV13) 1/11/1979 DTaP/Tdap/Td series (1 - Tdap) 1/11/1981 FOBT Q 1 YEAR AGE 50-75 3/8/2017 INFLUENZA AGE 9 TO ADULT 8/1/2017 PAP AKA CERVICAL CYTOLOGY 12/10/2018 BREAST CANCER SCRN MAMMOGRAM 3/10/2019 Allergies as of 5/2/2017  Review Complete On: 5/2/2017 By: Rakesh Chaudhary MD  
 No Known Allergies Current Immunizations  Reviewed on 9/15/2015 Name Date Influenza Vaccine 9/15/2015, 11/18/2014 Not reviewed this visit You Were Diagnosed With   
  
 Codes Comments Malignant neoplasm of female breast, unspecified laterality, unspecified site of breast ([facility] [address]    -  Primary ICD-10-CM: C50.919 ICD-9-CM: 174.9 Invasive ductal carcinoma of right breast, stage 1 ([facility]     ICD-10-CM: C50.911 ICD-9-CM: 174.9 Arthritis     ICD-10-CM: M19.90 ICD-9-CM: 716.90 Vitals BP Pulse Temp Height(growth percentile) Weight(growth percentile) BMI  
 153/87 77 98.2 °F (36.8 °C) 5' 4\" (1.626 m) 250 lb (113.4 kg) 42.91 kg/m2 OB Status Smoking Status Postmenopausal Former Smoker Vitals History BMI and BSA Data Body Mass Index Body Surface Area 42.91 kg/m 2 2.26 m 2 Preferred Pharmacy Pharmacy Name [facility] [address] [place] [address] Avenue [phone] Your Updated Medication List  
  
   
This list is accurate as of: 17  4:25 PM.  Always use your most recent med list.  
  
  
  
  
 * gabapentin 100 mg capsule Commonly known as:  NEURONTIN  
TAKE TWO CAPSULES BY MOUTH THREE TIMES DAILY * gabapentin 100 mg capsule Commonly known as:  NEURONTIN Take 2 Caps by mouth three (3) times daily. ibuprofen 800 mg tablet Commonly known as:  MOTRIN Take 1 Tab by mouth three (3) times daily as needed for Pain. Iron 325 mg (65 mg iron) tablet Generic drug:  ferrous sulfate Take 325 mg by mouth Daily (before breakfast). Indications: IRON DEFICIENCY ANEMIA  
  
 lovastatin 20 mg tablet Commonly known as:  MEVACOR Take 1 Tab by mouth daily. oxyCODONE-acetaminophen 5-325 mg per tablet Commonly known as:  PERCOCET  
1 - 2 tablets every 4 -6 hours as needed for pain  
  
 triamterene-hydroCHLOROthiazide 37.5-25 mg per capsule Commonly known as:  Jeneen Sable Take 1 Cap by mouth daily. * Notice: This list has 2 medication(s) that are the same as other medications prescribed for you. Read the directions carefully, and ask your doctor or other care provider to review them with you. Prescriptions Printed Refills  
 oxyCODONE-acetaminophen (PERCOCET) 5-325 mg per tablet 0 Si - 2 tablets every 4 -6 hours as needed for pain  
 Class: Print We Performed the Following COMPLETE CBC & AUTO DIFF WBC [47524 CPT(R)] Follow-up Instructions Return in about 3 months (around 8/2/2017). To-Do List   
 05/02/2017 Lab:  CBC WITH 3 PART DIFF   
  
 05/03/2017 Lab:  CA 27.29   
  
 05/03/2017 Lab:  METABOLIC PANEL, COMPREHENSIVE Patient Instructions Breast Cancer: Care Instructions Your Care Instructions Breast cancer occurs when abnormal cells grow out of control in the breast. These cancer cells can spread within the breast, to nearby lymph nodes and other tissues, and to other parts of the body. Being treated for cancer can weaken your body, and you may feel very tired. Get the rest your body needs so you can feel better. Finding out that you have cancer is scary. You may feel many emotions and may need some help coping. Seek out family, friends, and counselors for support. You also can do things at home to make yourself feel better while you go through treatment. Call the [employer] (7-370.755.1771) or visit its website at [address] for more information. Follow-up care is a key part of your treatment and safety. Be sure to make and go to all appointments, and call your doctor if you are having problems. It's also a good idea to know your test results and keep a list of the medicines you take. How can you care for yourself at home? · Take your medicines exactly as prescribed. Call your doctor if you think you are having a problem with your medicine. You may get medicine for nausea and vomiting if you have these side effects. · Follow your doctor's instructions to relieve pain. Pain from cancer and surgery can almost always be controlled. Use pain medicine when you first notice pain, before it becomes severe. · Eat healthy food.  If you do not feel like eating, try to eat food that has protein and extra calories to keep up your strength and prevent weight loss. Drink liquid meal replacements for extra calories and protein. Try to eat your main meal early. · Get some physical activity every day, but do not get too tired. Keep doing the hobbies you enjoy as your energy allows. · Do not smoke. Smoking can make your cancer worse. If you need help quitting, talk to your doctor about stop-smoking programs and medicines. These can increase your chances of quitting for good. · Take steps to control your stress and workload. Learn relaxation techniques. ¨ Share your feelings. Stress and tension affect our emotions. By expressing your feelings to others, you may be able to understand and cope with them. ¨ Consider joining a support group. Talking about a problem with your spouse, a good friend, or other people with similar problems is a good way to reduce tension and stress. ¨ Express yourself through art. Try writing, crafts, dance, or art to relieve stress. Some dance, writing, or art groups may be available just for people who have cancer. ¨ Be kind to your body and mind. Getting enough sleep, eating a healthy diet, and taking time to do things you enjoy can contribute to an overall feeling of balance in your life and can help reduce stress. ¨ Get help if you need it. Discuss your concerns with your doctor or counselor. · If you are vomiting or have diarrhea: ¨ Drink plenty of fluids (enough so that your urine is light yellow or clear like water) to prevent dehydration. Choose water and other caffeine-free clear liquids. If you have kidney, heart, or liver disease and have to limit fluids, talk with your doctor before you increase the amount of fluids you drink. ¨ When you are able to eat, try clear soups, mild foods, and liquids until all symptoms are gone for 12 to 48 hours. Other good choices include dry toast, crackers, cooked cereal, and gelatin dessert, such as Jell-O. · If you have not already done so, prepare a list of advance directives. Advance directives are instructions to your doctor and family members about what kind of care you want if you become unable to speak or express yourself. When should you call for help? Call your doctor now or seek immediate medical care if: 
· You have a fever. · Any part of your breast becomes red, tender, swollen, or hot. · You have pain, redness, or swelling in the arm on the same side as your breast cancer. Watch closely for changes in your health, and be sure to contact your doctor if: 
· You have pain that is not controlled by medicine. · You have nausea or vomiting. · You are constipated or have diarrhea. Where can you learn more? Go to http://hanna-dharmesh.info/. Enter V321 in the search box to learn more about \"Breast Cancer: Care Instructions. \" Current as of: July 26, 2016 Content Version: 11.2 © 8086-6576 [employer]. Care instructions adapted under license by [employer] (which disclaims liability or warranty for this information). If you have questions about a medical condition or this instruction, always ask your healthcare professional. [address] any warranty or liability for your use of this information. Introducing [facility]! David Mathews introduces [employer] patient portal. Now you can access parts of your medical record, email your doctor's office, and request medication refills online. 1. In your internet browser, go to [email or web address] 2. Click on the First Time User? Click Here link in the Sign In box. You will see the New Member Sign Up page. 3. Enter your [employer] Access Code exactly as it appears below. You will not need to use this code after youve completed the sign-up process. If you do not sign up before the expiration date, you must request a new code. · [employer] Access Code: [email or web address] Expires: 6/8/2017 11:25 AM 
 
 4. Enter the last four digits of your Social Security Number (xxxx) and Date of Birth (mm/dd/yyyy) as indicated and click Submit. You will be taken to the next sign-up page. 5. Create a [employer] ID. This will be your [employer] login ID and cannot be changed, so think of one that is secure and easy to remember. 6. Create a [employer] password. You can change your password at any time. 7. Enter your Password Reset Question and Answer. This can be used at a later time if you forget your password. 8. Enter your e-mail address. You will receive e-mail notification when new information is available in [address] 9. Click Sign Up. You can now view and download portions of your medical record. 10. Click the Download Summary menu link to download a portable copy of your medical information. If you have questions, please visit the Frequently Asked Questions section of the [employer] website. Remember, [employer] is NOT to be used for urgent needs. For medical emergencies, dial 911. Now available from your iPhone and Android! Please provide this summary of care documentation to your next provider. Your primary care clinician is listed as Rich Diallo. If you have any questions after today's visit, please call [phone].

## 2017-05-03 LAB
ALBUMIN SERPL-MCNC: 4 G/DL (ref 3.5–5.5)
ALBUMIN/GLOB SERPL: 1.1 {RATIO} (ref 1.2–2.2)
ALP SERPL-CCNC: 85 IU/L (ref 39–117)
ALT SERPL-CCNC: 15 IU/L (ref 0–32)
AST SERPL-CCNC: 14 IU/L (ref 0–40)
BILIRUB SERPL-MCNC: 0.3 MG/DL (ref 0–1.2)
BUN SERPL-MCNC: 12 MG/DL (ref 6–24)
BUN/CREAT SERPL: 17 (ref 9–23)
CALCIUM SERPL-MCNC: 9.8 MG/DL (ref 8.7–10.2)
CANCER AG27-29 SERPL-ACNC: 30.8 U/ML (ref 0–38.6)
CHLORIDE SERPL-SCNC: 96 MMOL/L (ref 96–106)
CO2 SERPL-SCNC: 25 MMOL/L (ref 18–29)
CREAT SERPL-MCNC: 0.72 MG/DL (ref 0.57–1)
GLOBULIN SER CALC-MCNC: 3.8 G/DL (ref 1.5–4.5)
GLUCOSE SERPL-MCNC: 116 MG/DL (ref 65–99)
POTASSIUM SERPL-SCNC: 3.6 MMOL/L (ref 3.5–5.2)
PROT SERPL-MCNC: 7.8 G/DL (ref 6–8.5)
SODIUM SERPL-SCNC: 140 MMOL/L (ref 134–144)

## 2017-05-15 DIAGNOSIS — E78.5 HYPERLIPIDEMIA, UNSPECIFIED HYPERLIPIDEMIA TYPE: ICD-10-CM

## 2017-05-15 DIAGNOSIS — I10 ESSENTIAL HYPERTENSION WITH GOAL BLOOD PRESSURE LESS THAN 140/90: ICD-10-CM

## 2017-05-15 RX ORDER — TRIAMTERENE AND HYDROCHLOROTHIAZIDE 37.5; 25 MG/1; MG/1
1 CAPSULE ORAL DAILY
Qty: 90 CAP | Refills: 1 | Status: SHIPPED | OUTPATIENT
Start: 2017-05-15 | End: 2017-11-07 | Stop reason: SDUPTHER

## 2017-05-15 RX ORDER — LOVASTATIN 20 MG/1
20 TABLET ORAL DAILY
Qty: 90 TAB | Refills: 1 | Status: SHIPPED | OUTPATIENT
Start: 2017-05-15 | End: 2017-12-07 | Stop reason: SDUPTHER

## 2017-05-22 ENCOUNTER — APPOINTMENT (OUTPATIENT)
Dept: GENERAL RADIOLOGY | Age: 57
End: 2017-05-22
Attending: EMERGENCY MEDICINE
Payer: COMMERCIAL

## 2017-05-22 ENCOUNTER — DOCUMENTATION ONLY (OUTPATIENT)
Dept: FAMILY MEDICINE CLINIC | Age: 57
End: 2017-05-22

## 2017-05-22 ENCOUNTER — HOSPITAL ENCOUNTER (EMERGENCY)
Age: 57
Discharge: HOME | End: 2017-05-22
Attending: EMERGENCY MEDICINE
Payer: COMMERCIAL

## 2017-05-22 VITALS
WEIGHT: 237 LBS | DIASTOLIC BLOOD PRESSURE: 84 MMHG | RESPIRATION RATE: 14 BRPM | OXYGEN SATURATION: 100 % | TEMPERATURE: 97.9 F | HEART RATE: 76 BPM | HEIGHT: 64 IN | BODY MASS INDEX: 40.46 KG/M2 | SYSTOLIC BLOOD PRESSURE: 100 MMHG

## 2017-05-22 DIAGNOSIS — M79.602 ARM PAIN, DIFFUSE, LEFT: Primary | ICD-10-CM

## 2017-05-22 LAB
ALBUMIN SERPL BCP-MCNC: 3.6 G/DL (ref 3.4–5)
ALBUMIN/GLOB SERPL: 0.7 {RATIO} (ref 0.8–1.7)
ALP SERPL-CCNC: 89 U/L (ref 45–117)
ALT SERPL-CCNC: 25 U/L (ref 13–56)
ANION GAP BLD CALC-SCNC: 6 MMOL/L (ref 3–18)
AST SERPL W P-5'-P-CCNC: 23 U/L (ref 15–37)
ATRIAL RATE: 84 BPM
BASOPHILS # BLD AUTO: 0 K/UL (ref 0–0.06)
BASOPHILS # BLD: 0 % (ref 0–2)
BILIRUB SERPL-MCNC: 0.3 MG/DL (ref 0.2–1)
BNP SERPL-MCNC: 52 PG/ML (ref 0–900)
BUN SERPL-MCNC: 12 MG/DL (ref 7–18)
BUN/CREAT SERPL: 16 (ref 12–20)
CALCIUM SERPL-MCNC: 9.6 MG/DL (ref 8.5–10.1)
CALCULATED P AXIS, ECG09: 53 DEGREES
CALCULATED R AXIS, ECG10: -12 DEGREES
CALCULATED T AXIS, ECG11: 21 DEGREES
CHLORIDE SERPL-SCNC: 99 MMOL/L (ref 100–108)
CK MB CFR SERPL CALC: 0.5 % (ref 0–4)
CK MB CFR SERPL CALC: ABNORMAL % (ref 0–4)
CK MB SERPL-MCNC: 1.1 NG/ML (ref 5–25)
CK MB SERPL-MCNC: <1 NG/ML (ref 5–25)
CK SERPL-CCNC: 222 U/L (ref 26–192)
CK SERPL-CCNC: 241 U/L (ref 26–192)
CO2 SERPL-SCNC: 34 MMOL/L (ref 21–32)
CREAT SERPL-MCNC: 0.76 MG/DL (ref 0.6–1.3)
D DIMER PPP FEU-MCNC: 1.01 UG/ML(FEU)
DIAGNOSIS, 93000: NORMAL
DIFFERENTIAL METHOD BLD: ABNORMAL
EOSINOPHIL # BLD: 0.3 K/UL (ref 0–0.4)
EOSINOPHIL NFR BLD: 5 % (ref 0–5)
ERYTHROCYTE [DISTWIDTH] IN BLOOD BY AUTOMATED COUNT: 14 % (ref 11.6–14.5)
GLOBULIN SER CALC-MCNC: 5 G/DL (ref 2–4)
GLUCOSE SERPL-MCNC: 92 MG/DL (ref 74–99)
HCT VFR BLD AUTO: 39 % (ref 35–45)
HGB BLD-MCNC: 12.5 G/DL (ref 12–16)
LYMPHOCYTES # BLD AUTO: 39 % (ref 21–52)
LYMPHOCYTES # BLD: 2.2 K/UL (ref 0.9–3.6)
MAGNESIUM SERPL-MCNC: 2 MG/DL (ref 1.6–2.6)
MCH RBC QN AUTO: 23.8 PG (ref 24–34)
MCHC RBC AUTO-ENTMCNC: 32.1 G/DL (ref 31–37)
MCV RBC AUTO: 74.3 FL (ref 74–97)
MONOCYTES # BLD: 0.4 K/UL (ref 0.05–1.2)
MONOCYTES NFR BLD AUTO: 7 % (ref 3–10)
NEUTS SEG # BLD: 2.7 K/UL (ref 1.8–8)
NEUTS SEG NFR BLD AUTO: 49 % (ref 40–73)
P-R INTERVAL, ECG05: 132 MS
PLATELET # BLD AUTO: 264 K/UL (ref 135–420)
PLATELET COMMENTS,PCOM: ABNORMAL
PMV BLD AUTO: 10 FL (ref 9.2–11.8)
POTASSIUM SERPL-SCNC: 3 MMOL/L (ref 3.5–5.5)
PROT SERPL-MCNC: 8.6 G/DL (ref 6.4–8.2)
Q-T INTERVAL, ECG07: 386 MS
QRS DURATION, ECG06: 86 MS
QTC CALCULATION (BEZET), ECG08: 456 MS
RBC # BLD AUTO: 5.25 M/UL (ref 4.2–5.3)
RBC MORPH BLD: ABNORMAL
SODIUM SERPL-SCNC: 139 MMOL/L (ref 136–145)
TROPONIN I SERPL-MCNC: <0.02 NG/ML (ref 0–0.04)
TROPONIN I SERPL-MCNC: <0.02 NG/ML (ref 0–0.04)
VENTRICULAR RATE, ECG03: 84 BPM
WBC # BLD AUTO: 5.6 K/UL (ref 4.6–13.2)

## 2017-05-22 PROCEDURE — 80053 COMPREHEN METABOLIC PANEL: CPT | Performed by: EMERGENCY MEDICINE

## 2017-05-22 PROCEDURE — 82550 ASSAY OF CK (CPK): CPT | Performed by: EMERGENCY MEDICINE

## 2017-05-22 PROCEDURE — 99285 EMERGENCY DEPT VISIT HI MDM: CPT

## 2017-05-22 PROCEDURE — 85379 FIBRIN DEGRADATION QUANT: CPT | Performed by: EMERGENCY MEDICINE

## 2017-05-22 PROCEDURE — 93005 ELECTROCARDIOGRAM TRACING: CPT

## 2017-05-22 PROCEDURE — 93971 EXTREMITY STUDY: CPT

## 2017-05-22 PROCEDURE — 83880 ASSAY OF NATRIURETIC PEPTIDE: CPT | Performed by: EMERGENCY MEDICINE

## 2017-05-22 PROCEDURE — 71010 XR CHEST PORT: CPT

## 2017-05-22 PROCEDURE — 85025 COMPLETE CBC W/AUTO DIFF WBC: CPT | Performed by: EMERGENCY MEDICINE

## 2017-05-22 PROCEDURE — 83735 ASSAY OF MAGNESIUM: CPT | Performed by: EMERGENCY MEDICINE

## 2017-05-22 RX ORDER — OXYCODONE AND ACETAMINOPHEN 5; 325 MG/1; MG/1
1 TABLET ORAL
Qty: 10 TAB | Refills: 0 | Status: SHIPPED | OUTPATIENT
Start: 2017-05-22 | End: 2017-08-08 | Stop reason: SDUPTHER

## 2017-05-22 NOTE — ED PROVIDER NOTES
HPI Comments: Freya Fuentes  Is a 61-year-old female past medical history of hypertension high cholesterol and breast cancer status post bilateral mastectomy in 2014 who presents with left arm pain has been intermittent for the past 2 weeks. Pain is nonexertional and not associated with any nausea or vomiting. Patient denies any injury or trauma. Patient does note that she has also had unilateral leg swelling on the left which she has not had she states she underwent her chemo. No long trips or travel. No other aggravating or alleviating factors. No other associated symptoms. This document was created with voice recognition technology and unrecognized errors may be present. Patient is a 62 y.o. female presenting with arm pain. Arm Pain           Past Medical History:   Diagnosis Date    Cancer [facility])     right breast cancer     Hypercholesterolemia     Hypertension        Past Surgical History:   Procedure Laterality Date    HX BREAST RECONSTRUCTION  8/13/2014    BILATERAL BREAST RECONSTRUCTION WITH TISSUE EXPANDERS performed by Lisa Aly MD at [address] [place] [address]  [facility]      HX CHOLECYSTECTOMY      HX GYN      fibroid tumors removed    HX MASTECTOMY  8/13/2014    BILATERAL BREAST MASTECTOMY SIMPLE performed by Rubi Strange MD at [facility] MAIN OR    HX TUBAL LIGATION      HX VASCULAR ACCESS      left chest         Family History:   Problem Relation Age of Onset    Hypertension Mother     Diabetes Father     Diabetes Daughter     Diabetes Son        Social History     Social History    Marital status:      Spouse name: N/A    Number of children: N/A    Years of education: N/A     Occupational History    Not on file.      Social History Main Topics    Smoking status: Former Smoker     Types: Cigarettes    Smokeless tobacco: Never Used      Comment: quit 6/2014    Alcohol use Yes      Comment: Socially    Drug use: No    Sexual activity: Not on file Other Topics Concern    Not on file     Social History Narrative         ALLERGIES: Review of patient's allergies indicates no known allergies. Review of Systems   All other systems reviewed and are negative. Vitals:    05/22/17 1238   BP: (!) 160/99   Pulse: 93   Resp: 20   Temp: 97.9 °F (36.6 °C)   SpO2: 99%   Weight: 107.5 kg (237 lb)   Height: 5' 4\" (1.626 m)            Physical Exam   Constitutional: She is oriented to person, place, and time. She appears well-developed. HENT:   Head: Normocephalic and atraumatic. Eyes: EOM are normal. Pupils are equal, round, and reactive to light. Neck: Normal range of motion. Neck supple. Cardiovascular: Normal rate, regular rhythm and normal heart sounds. Exam reveals no friction rub. No murmur heard. Pulmonary/Chest: Effort normal and breath sounds normal. No respiratory distress. She has no wheezes. Abdominal: Soft. She exhibits no distension. There is no tenderness. There is no rebound and no guarding. Musculoskeletal: Normal range of motion. Neurological: She is alert and oriented to person, place, and time. Skin: Skin is warm and dry. Psychiatric: She has a normal mood and affect. Her behavior is normal. Thought content normal.        MDM  Number of Diagnoses or Management Options  Diagnosis management comments: 59-year-old female presents with left arm pain. Intermittent over the past 2 weeks nonexertional no associated nausea or vomiting no associated shortness of breath. Low suspicion of a cyst will check troponin ×2. Patient also notes that she has incidental finding of left leg swelling similar to when she was undergoing chemo. We will send a d-dimer and if it is positive duplex. EKG: Sinus at 84 normal axis normal intervals no ST elevation or depression or hypertrophy . Compared to EKG done October 2014 no change    Trop neg x2 will d/c heart <4; will send back to pcp. patient had a left arm.   I do not think this is a PE.,  Arm pain ca be a typical presentation for ACS benign bilaterally without being presentation  For PE.    ED Course       Procedures

## 2017-05-22 NOTE — PROCEDURES
[facility]  *** FINAL REPORT ***    Name: Kimmy Garcia  MRN: [id number]  : 1960  HIS Order #: [id number]  [id number] [facility] Visit #: [id number]  Date: 22 May 2017    TYPE OF TEST: Peripheral Venous Testing    REASON FOR TEST  Limb swelling    Left Leg:-  Deep venous thrombosis:           No  Superficial venous thrombosis:    No  Deep venous insufficiency:        Not examined  Superficial venous insufficiency: Not examined      INTERPRETATION/FINDINGS  Duplex images were obtained using 2-D gray scale, color flow, and  spectral Doppler analysis. Left leg :  1. No evidence of deep venous thrombosis detected in the veins  visualized. 2. Deep vein(s) visualized include the common femoral, femoral,  popliteal, posterior tibial, and peroneal veins. 3. No evidence of superficial thrombosis detected. 4. Superficial vein(s) visualized include the great saphenous vein at  the sapheno-femoral junction. 5. No evidence of deep vein thrombosis in the contralateral common  femoral vein. ADDITIONAL COMMENTS    I have personally reviewed the data relevant to the interpretation of  this  study. TECHNOLOGIST: Riya Son. Abdulaziz LI  Signed: 2017 04:11 PM    PHYSICIAN: Erica Costello MD  Signed: 2017 10:32 AM

## 2017-05-22 NOTE — PROGRESS NOTES
Received call from pt with c/o left shoulder and arm pain down to her elbow, pain started over the weekend has been taking Ibuprofen with no relief, pain is now noted down in her hand, pt has taken Percocet with no relief. Pt denies chest pain or any other symptoms, pt still advised to go to the ER for evaluation.

## 2017-06-13 ENCOUNTER — OFFICE VISIT (OUTPATIENT)
Dept: FAMILY MEDICINE CLINIC | Age: 57
End: 2017-06-13

## 2017-06-13 VITALS
OXYGEN SATURATION: 95 % | WEIGHT: 250 LBS | RESPIRATION RATE: 18 BRPM | TEMPERATURE: 98 F | HEART RATE: 74 BPM | BODY MASS INDEX: 42.68 KG/M2 | DIASTOLIC BLOOD PRESSURE: 69 MMHG | HEIGHT: 64 IN | SYSTOLIC BLOOD PRESSURE: 127 MMHG

## 2017-06-13 DIAGNOSIS — M54.2 NECK PAIN: ICD-10-CM

## 2017-06-13 DIAGNOSIS — M79.602 LEFT ARM PAIN: ICD-10-CM

## 2017-06-13 DIAGNOSIS — R20.0 NUMBNESS AND TINGLING IN LEFT ARM: Primary | ICD-10-CM

## 2017-06-13 DIAGNOSIS — R20.2 NUMBNESS AND TINGLING IN LEFT ARM: Primary | ICD-10-CM

## 2017-06-13 RX ORDER — IBUPROFEN 800 MG/1
800 TABLET ORAL
Qty: 60 TAB | Refills: 0 | Status: SHIPPED | OUTPATIENT
Start: 2017-06-13 | End: 2017-09-29 | Stop reason: ALTCHOICE

## 2017-06-13 RX ORDER — CYCLOBENZAPRINE HCL 5 MG
5 TABLET ORAL
Qty: 20 TAB | Refills: 0 | Status: SHIPPED | OUTPATIENT
Start: 2017-06-13 | End: 2017-09-29 | Stop reason: ALTCHOICE

## 2017-06-13 NOTE — PROGRESS NOTES
HISTORY OF PRESENT ILLNESS  Rasta Caldwell is a 62 y.o. female. 6/13/2017  11:20 AM    Chief Complaint   Patient presents with   [facility]     ER visit on 5/22/17 for left arm pain and tingling, stil going on today       HPI: Here today for complaints of left arm pain. PCP Dr Foreign Ibarra. Left upper arm pain started first and it worked its way into the left hand that has been happening over the last 2 months. No injuries or possible causes that she can think of. She reports that she has intermittent numbness and tingling throughout the whole arm, left thumb and pointer finger stay numb all the time. She has been taking PRN Percocet for the pain as prescribed by the ER- does help with symptoms. She reports some neck discomfort from time to time but always thought she slept wrong. Evaluated in ER on 5/22 for symptoms and had negative cardiac work up. Review of Systems   Constitutional: Negative for chills, fever and malaise/fatigue. Respiratory: Negative for cough, shortness of breath and wheezing. Cardiovascular: Negative for chest pain, palpitations and leg swelling. Gastrointestinal: Negative for abdominal pain, diarrhea, nausea and vomiting. Musculoskeletal: Positive for myalgias and neck pain. Negative for falls. Neurological: Positive for tingling and sensory change. Negative for dizziness and headaches.        PHQ Screening   PHQ over the last two weeks 6/13/2017   Little interest or pleasure in doing things Not at all   Feeling down, depressed or hopeless Not at all   Total Score PHQ 2 0         History  Past Medical History:   Diagnosis Date    Cancer [facility])     right breast cancer     Hypercholesterolemia     Hypertension        Past Surgical History:   Procedure Laterality Date    HX BREAST RECONSTRUCTION  8/13/2014    BILATERAL BREAST RECONSTRUCTION WITH TISSUE EXPANDERS performed by Emely Nascimento MD at [address] [place] [address]  [facility] CHOLECYSTECTOMY      HX GYN      fibroid tumors removed    HX MASTECTOMY  8/13/2014    BILATERAL BREAST MASTECTOMY SIMPLE performed by Violet Palacios MD at [facility] MAIN OR    HX TUBAL LIGATION      HX VASCULAR ACCESS      left chest       Social History     Social History    Marital status:      Spouse name: N/A    Number of children: N/A    Years of education: N/A     Occupational History    Not on file. Social History Main Topics    Smoking status: Former Smoker     Types: Cigarettes    Smokeless tobacco: Never Used      Comment: quit 6/2014    Alcohol use No      Comment: Socially    Drug use: No    Sexual activity: No     Other Topics Concern    Not on file     Social History Narrative       No Known Allergies    Current Outpatient Prescriptions   Medication Sig Dispense Refill    oxyCODONE-acetaminophen (PERCOCET) 5-325 mg per tablet Take 1 Tab by mouth every four (4) hours as needed for Pain. Max Daily Amount: 6 Tabs. 10 Tab 0    lovastatin (MEVACOR) 20 mg tablet Take 1 Tab by mouth daily. 90 Tab 1    triamterene-hydroCHLOROthiazide (DYAZIDE) 37.5-25 mg per capsule Take 1 Cap by mouth daily. 90 Cap 1    gabapentin (NEURONTIN) 100 mg capsule Take 2 Caps by mouth three (3) times daily. 180 Cap 11    ferrous sulfate (IRON) 325 mg (65 mg iron) tablet Take 325 mg by mouth Daily (before breakfast). Indications: IRON DEFICIENCY ANEMIA      ibuprofen (MOTRIN) 800 mg tablet Take 1 Tab by mouth three (3) times daily as needed for Pain. 40 Tab 0         Patient Care Team:  Patient Care Team:  Moni Suresh MD as PCP - General (Family Practice)  Violet Palacios MD as Surgeon (General Surgery)  Raynell Ahumada, RN as Nurse Elaine Velasquez MD (Oncology)        LABS:  Results for Marcial Joy (MRN [id number]) as of 6/13/2017 11:23   Ref.  Range 5/22/2017 13:10 5/22/2017 16:25   Sodium Latest Ref Range: 136 - 145 mmol/L 139    Potassium Latest Ref Range: 3.5 - 5.5 mmol/L 3.0 (L)    Chloride Latest Ref Range: 100 - 108 mmol/L 99 (L)    CO2 Latest Ref Range: 21 - 32 mmol/L 34 (H)    Anion gap Latest Ref Range: 3.0 - 18 mmol/L 6    Glucose Latest Ref Range: 74 - 99 mg/dL 92    BUN Latest Ref Range: 7.0 - 18 MG/DL 12    Creatinine Latest Ref Range: 0.6 - 1.3 MG/DL 0.76    BUN/Creatinine ratio Latest Ref Range: 12 - 20   16    Calcium Latest Ref Range: 8.5 - 10.1 MG/DL 9.6    Magnesium Latest Ref Range: 1.6 - 2.6 mg/dL 2.0    GFR est non-AA Latest Ref Range: >60 ml/min/1.73m2 >60    GFR est AA Latest Ref Range: >60 ml/min/1.73m2 >60    Bilirubin, total Latest Ref Range: 0.2 - 1.0 MG/DL 0.3    Protein, total Latest Ref Range: 6.4 - 8.2 g/dL 8.6 (H)    Albumin Latest Ref Range: 3.4 - 5.0 g/dL 3.6    Globulin Latest Ref Range: 2.0 - 4.0 g/dL 5.0 (H)    A-G Ratio Latest Ref Range: 0.8 - 1.7   0.7 (L)    ALT (SGPT) Latest Ref Range: 13 - 56 U/L 25    AST Latest Ref Range: 15 - 37 U/L 23    Alk. phosphatase Latest Ref Range: 45 - 117 U/L 89    CK Latest Ref Range: 26 - 192 U/L 241 (H) 222 (H)   CK-MB Index Latest Ref Range: 0.0 - 4.0 % Cannot be calulated 0.5   CK - MB Latest Ref Range: <3.6 ng/ml <1.0 1.1   Troponin-I, Qt. Latest Ref Range: 0.0 - 0.045 NG/ML <0.02 <0.02   NT pro-BNP Latest Ref Range: 0 - 900 PG/ML 52      Radiology:   Normal sinus rhythm   Normal ECG   When compared with ECG of 04-AUG-2014 13:54,   No significant change was found   Confirmed by Homero Berrios MD, --- ([id number]) on 5/22/2017 3:34:52 PM     Physical Exam   Constitutional: She is oriented to person, place, and time. She appears well-developed and well-nourished. No distress. Neck: Normal range of motion. Neck supple. Cardiovascular: Normal rate, regular rhythm and normal heart sounds. No murmur heard. Pulmonary/Chest: Effort normal and breath sounds normal. No respiratory distress. Abdominal: Soft. Bowel sounds are normal. There is no tenderness. Musculoskeletal: She exhibits no edema. Left shoulder: She exhibits pain.  She exhibits normal range of motion and no swelling. Cervical back: She exhibits pain. She exhibits normal range of motion and no swelling. Left upper arm: She exhibits no swelling. Left forearm: She exhibits tenderness. She exhibits no swelling. Left hand: She exhibits normal range of motion, normal capillary refill, no deformity and no swelling. Normal sensation noted. Normal strength noted. -entire left arm pain reported from left side of cervical spine to fingertips of left hand; also reported altered sensation   Neurological: She is alert and oriented to person, place, and time. No cranial nerve deficit. She exhibits normal muscle tone. Coordination normal.   Skin: Skin is warm and dry. Vitals:    06/13/17 1103   BP: 127/69   Pulse: 74   Resp: 18   Temp: 98 °F (36.7 °C)   TempSrc: Oral   SpO2: 95%   Weight: 250 lb (113.4 kg)   Height: 5' 4\" (1.626 m)   PainSc:   3   PainLoc: Arm       ASSESSMENT and [address] was seen today for hospital follow up. Diagnoses and all orders for this visit:    Numbness and tingling in left arm/ Left arm pain/ Neck pain  *Discussed with patient about possible causes for symptoms. Advised on C-spine DDD or other abnormalities that may be causing radicular symptoms in the arm. Recommend to further evaluate with xray and EMG- declines EMG at this time. Will do xray. *Already on Gabapentin and consider increasing in future if needed. Will do PRN muscle relaxer and NSAID. Consider steroid pack if needed. *Discussed with patient about symptoms that would require an ER visit. Patient understands symptoms that are an emergency and will go to the ER if they occur.   -     XR SPINE CERV PA LAT ODONT 3 V MAX; Future  -     cyclobenzaprine (FLEXERIL) 5 mg tablet; Take 1 Tab by mouth every twelve (12) hours as needed for Muscle Spasm(s). -     ibuprofen (MOTRIN) 800 mg tablet; Take 1 Tab by mouth three (3) times daily as needed for Pain.       *Plan of care reviewed with patient. Patient in agreement with plan and expresses understanding. All questions answered and patient encouraged to call or RTO if further questions or concerns. Follow-up Disposition:  Return if symptoms worsen or fail to improve.

## 2017-06-13 NOTE — PATIENT INSTRUCTIONS
Numbness and Tingling: Care Instructions  Your Care Instructions  Many things can cause numbness or tingling. Swelling may put pressure on a nerve. This could cause you to lose feeling or have a pins-and-needles sensation on part of your body. Nerves may be damaged from trauma, toxins, or diseases, such as diabetes or multiple sclerosis (MS). Sometimes, though, the cause is not clear. If there is no clear reason for your symptoms, and you are not having any other symptoms, your doctor may suggest watching and waiting for a while to see if the numbness or tingling goes away on its own. Your doctor may want you to have blood or nerve tests to find the cause of your symptoms. Follow-up care is a key part of your treatment and safety. Be sure to make and go to all appointments, and call your doctor if you are having problems. It's also a good idea to know your test results and keep a list of the medicines you take. How can you care for yourself at home? · If your doctor prescribes medicine, take it exactly as directed. Call your doctor if you think you are having a problem with your medicine. · If you have any swelling, put ice or a cold pack on the area for 10 to 20 minutes at a time. Put a thin cloth between the ice and your skin. When should you call for help? Call 911 anytime you think you may need emergency care. For example, call if:  · You have weakness, numbness, or tingling in both legs. · You lose bowel or bladder control. · You have symptoms of a stroke. These may include:  ¨ Sudden numbness, tingling, weakness, or loss of movement in your face, arm, or leg, especially on only one side of your body. ¨ Sudden vision changes. ¨ Sudden trouble speaking. ¨ Sudden confusion or trouble understanding simple statements. ¨ Sudden problems with walking or balance. ¨ A sudden, severe headache that is different from past headaches.   Watch closely for changes in your health, and be sure to contact your doctor if you have any problems, or if:  · You do not get better as expected. Where can you learn more? Go to http://hanna-dharmesh.info/. Enter A841 in the search box to learn more about \"Numbness and Tingling: Care Instructions. \"  Current as of: October 14, 2016  Content Version: 11.2  © 3717-5197 mymission2. Care instructions adapted under license by [employer] (which disclaims liability or warranty for this information). If you have questions about a medical condition or this instruction, always ask your healthcare professional. [place] [address] any warranty or liability for your use of this information.

## 2017-06-13 NOTE — MR AVS SNAPSHOT
Visit Information Date & Time Provider Department Dept. Phone Encounter #  
 6/13/2017 11:00 AM Amrita Easton, [address] [facility] [phone] [id number] Follow-up Instructions Return if symptoms worsen or fail to improve. Your Appointments 8/1/2017 11:30 AM  
Follow Up with MD JOHN Gray [facility] (Thedora Minder) Appt Note: 6mth F/U  
 [address] [place] [address] [facility] [address] [address]  
  
    
 8/8/2017 10:15 AM  
Office Visit with Didi Gregorio MD  
[address] (Thedora Minder) Appt Note: OV  
 [facility] [address] [facility] [address] [facility] [address]  
[phone]  
  
   
 [facility] [address]  
  
    
 9/29/2017 10:00 AM  
FOLLOW UP EXAM with Cheng Le MD  
[facility]) Appt Note: 6 month f/u  
 [address] [facility] [address]  
[facility] [address] Upcoming Health Maintenance Date Due Pneumococcal 19-64 Highest Risk (1 of 3 - PCV13) 1/11/1979 DTaP/Tdap/Td series (1 - Tdap) 1/11/1981 FOBT Q 1 YEAR AGE 50-75 3/8/2017 INFLUENZA AGE 9 TO ADULT 8/1/2017 PAP AKA CERVICAL CYTOLOGY 12/10/2018 BREAST CANCER SCRN MAMMOGRAM 3/10/2019 Allergies as of 6/13/2017  Review Complete On: 6/13/2017 By: Swati Ching LPN No Known Allergies Current Immunizations  Reviewed on 9/15/2015 Name Date Influenza Vaccine 9/15/2015, 11/18/2014 Not reviewed this visit You Were Diagnosed With   
  
 Codes Comments Numbness and tingling in left arm    -  Primary ICD-10-CM: R20.0, R20.2 ICD-9-CM: 782.0 Left arm pain     ICD-10-CM: M79.602 ICD-9-CM: 729.5 Vitals BP Pulse Temp Resp Height(growth percentile) Weight(growth percentile) 127/69 (BP 1 Location: Left arm, BP Patient Position: Sitting) 74 98 °F (36.7 °C) (Oral) 18 5' 4\" (1.626 m) 250 lb (113.4 kg) SpO2 BMI OB Status Smoking Status 95% 42.91 kg/m2 Postmenopausal Former Smoker BMI and BSA Data Body Mass Index Body Surface Area 42.91 kg/m 2 2.26 m 2 Preferred Pharmacy Pharmacy Name Phone [facility] [address] [place] [address] [phone] Your Updated Medication List  
  
   
This list is accurate as of: 6/13/17 11:34 AM.  Always use your most recent med list.  
  
  
  
  
 cyclobenzaprine 5 mg tablet Commonly known as:  FLEXERIL Take 1 Tab by mouth every twelve (12) hours as needed for Muscle Spasm(s). gabapentin 100 mg capsule Commonly known as:  NEURONTIN Take 2 Caps by mouth three (3) times daily. ibuprofen 800 mg tablet Commonly known as:  MOTRIN Take 1 Tab by mouth three (3) times daily as needed for Pain. Iron 325 mg (65 mg iron) tablet Generic drug:  ferrous sulfate Take 325 mg by mouth Daily (before breakfast). Indications: IRON DEFICIENCY ANEMIA  
  
 lovastatin 20 mg tablet Commonly known as:  MEVACOR Take 1 Tab by mouth daily. oxyCODONE-acetaminophen 5-325 mg per tablet Commonly known as:  PERCOCET Take 1 Tab by mouth every four (4) hours as needed for Pain. Max Daily Amount: 6 Tabs. triamterene-hydroCHLOROthiazide 37.5-25 mg per capsule Commonly known as:  [place] Take 1 Cap by mouth daily. Prescriptions Sent to Pharmacy Refills  
 cyclobenzaprine (FLEXERIL) 5 mg tablet 0 Sig: Take 1 Tab by mouth every twelve (12) hours as needed for Muscle Spasm(s). Class: Normal  
 Pharmacy: [id number] [facility] [address] Ph #: [phone] Route: Oral  
 ibuprofen (MOTRIN) 800 mg tablet 0 Sig: Take 1 Tab by mouth three (3) times daily as needed for Pain.   
 Class: Normal  
 Pharmacy: [id number] [place], [address] [place] [address] #: [phone] Route: Oral  
  
Follow-up Instructions Return if symptoms worsen or fail to improve. To-Do List   
 Around 06/14/2017 Imaging:  XR SPINE CERV PA LAT ODONT 3 V MAX Patient Instructions Numbness and Tingling: Care Instructions Your Care Instructions Many things can cause numbness or tingling. Swelling may put pressure on a nerve. This could cause you to lose feeling or have a pins-and-needles sensation on part of your body. Nerves may be damaged from trauma, toxins, or diseases, such as diabetes or multiple sclerosis (MS). Sometimes, though, the cause is not clear. If there is no clear reason for your symptoms, and you are not having any other symptoms, your doctor may suggest watching and waiting for a while to see if the numbness or tingling goes away on its own. Your doctor may want you to have blood or nerve tests to find the cause of your symptoms. Follow-up care is a key part of your treatment and safety. Be sure to make and go to all appointments, and call your doctor if you are having problems. It's also a good idea to know your test results and keep a list of the medicines you take. How can you care for yourself at home? · If your doctor prescribes medicine, take it exactly as directed. Call your doctor if you think you are having a problem with your medicine. · If you have any swelling, put ice or a cold pack on the area for 10 to 20 minutes at a time. Put a thin cloth between the ice and your skin. When should you call for help? Call 911 anytime you think you may need emergency care. For example, call if: 
· You have weakness, numbness, or tingling in both legs. · You lose bowel or bladder control. · You have symptoms of a stroke. These may include: 
¨ Sudden numbness, tingling, weakness, or loss of movement in your face, arm, or leg, especially on only one side of your body. ¨ Sudden vision changes. ¨ Sudden trouble speaking. ¨ Sudden confusion or trouble understanding simple statements. ¨ Sudden problems with walking or balance. ¨ A sudden, severe headache that is different from past headaches. Watch closely for changes in your health, and be sure to contact your doctor if you have any problems, or if: 
· You do not get better as expected. Where can you learn more? Go to http://hanna-dharmesh.info/. Enter W717 in the search box to learn more about \"Numbness and Tingling: Care Instructions. \" Current as of: October 14, 2016 Content Version: 11.2 © 2644-2301 [employer]. Care instructions adapted under license by [employer] (which disclaims liability or warranty for this information). If you have questions about a medical condition or this instruction, always ask your healthcare professional. [address] any warranty or liability for your use of this information. Introducing [facility]! Janet Sparks introduces TalkShoe patient portal. Now you can access parts of your medical record, email your doctor's office, and request medication refills online. 1. In your internet browser, go to [email or web address] 2. Click on the First Time User? Click Here link in the Sign In box. You will see the New Member Sign Up page. 3. Enter your TalkShoe Access Code exactly as it appears below. You will not need to use this code after youve completed the sign-up process. If you do not sign up before the expiration date, you must request a new code. · TalkShoe Access Code: [email or web address] Expires: 9/9/2017  8:06 PM 
 
4. Enter the last four digits of your Social Security Number (xxxx) and Date of Birth (mm/dd/yyyy) as indicated and click Submit. You will be taken to the next sign-up page. 5. Create a TalkShoe ID.  This will be your TalkShoe login ID and cannot be changed, so think of one that is secure and easy to remember. 6. Create a [employer] password. You can change your password at any time. 7. Enter your Password Reset Question and Answer. This can be used at a later time if you forget your password. 8. Enter your e-mail address. You will receive e-mail notification when new information is available in [address] 9. Click Sign Up. You can now view and download portions of your medical record. 10. Click the Download Summary menu link to download a portable copy of your medical information. If you have questions, please visit the Frequently Asked Questions section of the [employer] website. Remember, [employer] is NOT to be used for urgent needs. For medical emergencies, dial 911. Now available from your iPhone and Android! Please provide this summary of care documentation to your next provider. Your primary care clinician is listed as Jefferson Mendes. If you have any questions after today's visit, please call [phone].

## 2017-06-26 ENCOUNTER — HOSPITAL ENCOUNTER (OUTPATIENT)
Dept: GENERAL RADIOLOGY | Age: 57
Discharge: HOME | End: 2017-06-26
Payer: COMMERCIAL

## 2017-06-26 DIAGNOSIS — M79.602 LEFT ARM PAIN: ICD-10-CM

## 2017-06-26 DIAGNOSIS — M54.2 NECK PAIN: ICD-10-CM

## 2017-06-26 DIAGNOSIS — R20.0 NUMBNESS AND TINGLING IN LEFT ARM: ICD-10-CM

## 2017-06-26 DIAGNOSIS — R20.2 NUMBNESS AND TINGLING IN LEFT ARM: ICD-10-CM

## 2017-06-26 PROCEDURE — 72040 X-RAY EXAM NECK SPINE 2-3 VW: CPT

## 2017-08-01 ENCOUNTER — OFFICE VISIT (OUTPATIENT)
Dept: SURGERY | Age: 57
End: 2017-08-01

## 2017-08-01 VITALS
TEMPERATURE: 97.9 F | RESPIRATION RATE: 16 BRPM | SYSTOLIC BLOOD PRESSURE: 118 MMHG | DIASTOLIC BLOOD PRESSURE: 72 MMHG | WEIGHT: 251 LBS | BODY MASS INDEX: 42.85 KG/M2 | HEIGHT: 64 IN | HEART RATE: 78 BPM

## 2017-08-01 DIAGNOSIS — Z85.3 HISTORY OF RIGHT BREAST CANCER: Primary | ICD-10-CM

## 2017-08-01 NOTE — PROGRESS NOTES
[facility]  General Surgery    Subjective:  Patient with history of stage II right breast cancer stage 0 left breast cancer status post bilateral mastectomies and adjuvant chemotherapy doing well. It is been 3 years since her surgery. She has no complaints. Objective:  Vitals:    08/01/17 1039   BP: 118/72   Pulse: 78   Resp: 16   Temp: 97.9 °F (36.6 °C)   TempSrc: Oral   Weight: 113.9 kg (251 lb)   Height: 5' 4\" (1.626 m)       Physical Exam:    General: Awake and alert, oriented ×4, no apparent distress   Breasts:    Left: No dimpling, discoloration   No axillary or supraclavicular lymphadenopathy. No mass   Right: No dimpling, discoloration   No axillary or supraclavicular lymphadenopathy. No mass          Current Medications:  Current Outpatient Prescriptions   Medication Sig Dispense Refill    cyclobenzaprine (FLEXERIL) 5 mg tablet Take 1 Tab by mouth every twelve (12) hours as needed for Muscle Spasm(s). 20 Tab 0    ibuprofen (MOTRIN) 800 mg tablet Take 1 Tab by mouth three (3) times daily as needed for Pain. 60 Tab 0    oxyCODONE-acetaminophen (PERCOCET) 5-325 mg per tablet Take 1 Tab by mouth every four (4) hours as needed for Pain. Max Daily Amount: 6 Tabs. 10 Tab 0    lovastatin (MEVACOR) 20 mg tablet Take 1 Tab by mouth daily. 90 Tab 1    triamterene-hydroCHLOROthiazide (DYAZIDE) 37.5-25 mg per capsule Take 1 Cap by mouth daily. 90 Cap 1    gabapentin (NEURONTIN) 100 mg capsule Take 2 Caps by mouth three (3) times daily. 180 Cap 11    ferrous sulfate (IRON) 325 mg (65 mg iron) tablet Take 325 mg by mouth Daily (before breakfast). Indications: IRON DEFICIENCY ANEMIA         Chart and notes reviewed. Data reviewed. I have evaluated and examined the patient. Impression and plan:  Patient with history of stage II right breast cancer and stage 0 left breast cancer status post bilateral mastectomies in 2014 and adjuvant chemotherapy doing well.   Follow-up in 6 months for clinical breast exam.     Kathya Foote MD

## 2017-08-01 NOTE — MR AVS SNAPSHOT
Visit Information Date & Time Provider Department Dept. Phone Encounter #  
 8/1/2017 10:30 AM Isaías Dougherty  [address] [id number] Your Appointments 8/8/2017 10:15 AM  
Office Visit with Camila Bolton MD  
[address] Appt Note: OV  
 [address] [facility] [address]  
[phone]  
  
   
 [address]  
  
    
 9/29/2017 10:00 AM  
FOLLOW UP EXAM with Livier Gardner MD  
Formerly [facility]) Appt Note: 6 month f/u  
 [address] [facility] [id number]  
[facility] [address] Upcoming Health Maintenance Date Due Pneumococcal 19-64 Highest Risk (1 of 3 - PCV13) 1/11/1979 DTaP/Tdap/Td series (1 - Tdap) 1/11/1981 FOBT Q 1 YEAR AGE 50-75 3/8/2017 INFLUENZA AGE 9 TO ADULT 8/1/2017 PAP AKA CERVICAL CYTOLOGY 12/10/2018 BREAST CANCER SCRN MAMMOGRAM 3/10/2019 Allergies as of 8/1/2017  Review Complete On: 8/1/2017 By: Isaías Dougherty MD  
 No Known Allergies Current Immunizations  Reviewed on 9/15/2015 Name Date Influenza Vaccine 9/15/2015, 11/18/2014 Not reviewed this visit You Were Diagnosed With   
  
 Codes Comments History of right breast cancer    -  Primary ICD-10-CM: Z85.3 ICD-9-CM: V10.3 Vitals BP Pulse Temp Resp Height(growth percentile) Weight(growth percentile) 118/72 78 97.9 °F (36.6 °C) (Oral) 16 5' 4\" (1.626 m) 251 lb (113.9 kg) BMI OB Status Smoking Status 43.08 kg/m2 Postmenopausal Former Smoker Vitals History BMI and BSA Data Body Mass Index Body Surface Area 43.08 kg/m 2 2.27 m 2 Preferred Pharmacy Pharmacy Name Phone [facility] [address] [place] [address] [phone] Your Updated Medication List  
  
 This list is accurate as of: 8/1/17 11:13 AM.  Always use your most recent med list.  
  
  
  
  
 cyclobenzaprine 5 mg tablet Commonly known as:  FLEXERIL Take 1 Tab by mouth every twelve (12) hours as needed for Muscle Spasm(s). gabapentin 100 mg capsule Commonly known as:  NEURONTIN Take 2 Caps by mouth three (3) times daily. ibuprofen 800 mg tablet Commonly known as:  MOTRIN Take 1 Tab by mouth three (3) times daily as needed for Pain. Iron 325 mg (65 mg iron) tablet Generic drug:  ferrous sulfate Take 325 mg by mouth Daily (before breakfast). Indications: IRON DEFICIENCY ANEMIA  
  
 lovastatin 20 mg tablet Commonly known as:  MEVACOR Take 1 Tab by mouth daily. oxyCODONE-acetaminophen 5-325 mg per tablet Commonly known as:  PERCOCET Take 1 Tab by mouth every four (4) hours as needed for Pain. Max Daily Amount: 6 Tabs. triamterene-hydroCHLOROthiazide 37.5-25 mg per capsule Commonly known as:  Gisell Dhaliwal Take 1 Cap by mouth daily. Introducing [facility]! Sergio Moyer introduces Setup patient portal. Now you can access parts of your medical record, email your doctor's office, and request medication refills online. 1. In your internet browser, go to [email or web address] 2. Click on the First Time User? Click Here link in the Sign In box. You will see the New Member Sign Up page. 3. Enter your Setup Access Code exactly as it appears below. You will not need to use this code after youve completed the sign-up process. If you do not sign up before the expiration date, you must request a new code. · Setup Access Code: [email or web address] Expires: 9/9/2017  8:06 PM 
 
4. Enter the last four digits of your Social Security Number (xxxx) and Date of Birth (mm/dd/yyyy) as indicated and click Submit. You will be taken to the next sign-up page. 5. Create a Setup ID.  This will be your Setup login ID and cannot be changed, so think of one that is secure and easy to remember. 6. Create a [employer] password. You can change your password at any time. 7. Enter your Password Reset Question and Answer. This can be used at a later time if you forget your password. 8. Enter your e-mail address. You will receive e-mail notification when new information is available in [address] 9. Click Sign Up. You can now view and download portions of your medical record. 10. Click the Download Summary menu link to download a portable copy of your medical information. If you have questions, please visit the Frequently Asked Questions section of the [employer] website. Remember, [employer] is NOT to be used for urgent needs. For medical emergencies, dial 911. Now available from your iPhone and Android! Please provide this summary of care documentation to your next provider. Your primary care clinician is listed as Edna Bahena. If you have any questions after today's visit, please call [phone].

## 2017-08-08 ENCOUNTER — OFFICE VISIT (OUTPATIENT)
Dept: ONCOLOGY | Age: 57
End: 2017-08-08

## 2017-08-08 ENCOUNTER — HOSPITAL ENCOUNTER (OUTPATIENT)
Dept: ONCOLOGY | Age: 57
Discharge: HOME | End: 2017-08-08

## 2017-08-08 VITALS
DIASTOLIC BLOOD PRESSURE: 85 MMHG | WEIGHT: 251 LBS | HEART RATE: 68 BPM | TEMPERATURE: 98.5 F | BODY MASS INDEX: 42.85 KG/M2 | HEIGHT: 64 IN | SYSTOLIC BLOOD PRESSURE: 141 MMHG

## 2017-08-08 DIAGNOSIS — C50.919 MALIGNANT NEOPLASM OF FEMALE BREAST, UNSPECIFIED LATERALITY, UNSPECIFIED SITE OF BREAST: ICD-10-CM

## 2017-08-08 DIAGNOSIS — C50.911 INVASIVE DUCTAL CARCINOMA OF RIGHT BREAST, STAGE 1 (HCC): ICD-10-CM

## 2017-08-08 DIAGNOSIS — C50.919 MALIGNANT NEOPLASM OF FEMALE BREAST, UNSPECIFIED LATERALITY, UNSPECIFIED SITE OF BREAST: Primary | ICD-10-CM

## 2017-08-08 DIAGNOSIS — M19.90 ARTHRITIS: ICD-10-CM

## 2017-08-08 DIAGNOSIS — T45.1X5A CHEMOTHERAPY-INDUCED PERIPHERAL NEUROPATHY (HCC): ICD-10-CM

## 2017-08-08 DIAGNOSIS — G62.0 CHEMOTHERAPY-INDUCED PERIPHERAL NEUROPATHY (HCC): ICD-10-CM

## 2017-08-08 LAB
BASO+EOS+MONOS # BLD AUTO: 0.6 K/UL (ref 0–2.3)
BASO+EOS+MONOS # BLD AUTO: 10 % (ref 0.1–17)
DIFFERENTIAL METHOD BLD: ABNORMAL
ERYTHROCYTE [DISTWIDTH] IN BLOOD BY AUTOMATED COUNT: 13.3 % (ref 11.5–14.5)
HCT VFR BLD AUTO: 39 % (ref 36–48)
HGB BLD-MCNC: 12.3 G/DL (ref 12–16)
LYMPHOCYTES # BLD AUTO: 37 % (ref 14–44)
LYMPHOCYTES # BLD: 2.4 K/UL (ref 1.1–5.9)
MCH RBC QN AUTO: 23.7 PG (ref 25–35)
MCHC RBC AUTO-ENTMCNC: 31.5 G/DL (ref 31–37)
MCV RBC AUTO: 75.3 FL (ref 78–102)
NEUTS SEG # BLD: 3.4 K/UL (ref 1.8–9.5)
NEUTS SEG NFR BLD AUTO: 53 % (ref 40–70)
PLATELET # BLD AUTO: 256 K/UL (ref 140–440)
RBC # BLD AUTO: 5.18 M/UL (ref 4.1–5.1)
WBC # BLD AUTO: 6.4 K/UL (ref 4.5–13)

## 2017-08-08 RX ORDER — OXYCODONE AND ACETAMINOPHEN 5; 325 MG/1; MG/1
1 TABLET ORAL
Qty: 10 TAB | Refills: 0 | Status: SHIPPED | OUTPATIENT
Start: 2017-08-08 | End: 2017-09-29 | Stop reason: ALTCHOICE

## 2017-08-08 NOTE — PROGRESS NOTES
Hematology/Oncology  Progress Note    Name: Ritchie Zhao  Date: 2017  : 1960    PCP: Danny Montes MD     Ms. Viry Ramirez is a 62 y.o.  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  Current therapy: The patient completed adjuvant systemic chemotherapy      Subjective:     Ms. Viry Ramirez is a 70-year-old  woman who has a history of DCIS involving the left breast and invasive ductal adenocarcinoma of the right breast. The patient has done well since she completed her systemic chemotherapy. She also underwent bilateral mastectomies. She decided not to proceed with reconstruction. She is requesting for a new prescription of her percocet. She has no physical complaints at this time. Past medical history, family history, and social history: these were reviewed and remains unchanged. Past Medical History:   Diagnosis Date    Cancer [facility])     right breast cancer     DDD (degenerative disc disease), cervical 2017    Hypercholesterolemia     Hypertension      Past Surgical History:   Procedure Laterality Date    HX BREAST RECONSTRUCTION  2014    BILATERAL BREAST RECONSTRUCTION WITH TISSUE EXPANDERS performed by Eleazar Duque MD at [facility] MAIN OR    HX  SECTION      HX CHOLECYSTECTOMY      HX GYN      fibroid tumors removed    HX MASTECTOMY  2014    BILATERAL BREAST MASTECTOMY SIMPLE performed by Idalia Hernandez MD at [facility] MAIN OR    HX TUBAL LIGATION      HX VASCULAR ACCESS      left chest     Social History     Social History    Marital status:      Spouse name: N/A    Number of children: N/A    Years of education: N/A     Occupational History    Not on file.      Social History Main Topics    Smoking status: Former Smoker     Types: Cigarettes    Smokeless tobacco: Never Used      Comment: quit 2014    Alcohol use No      Comment: Socially    Drug use: No    Sexual activity: No     Other Topics Concern    Not on file     Social History Narrative     Family History   Problem Relation Age of Onset    Hypertension Mother     Diabetes Father     Diabetes Daughter     Diabetes Son      Current Outpatient Prescriptions   Medication Sig Dispense Refill    oxyCODONE-acetaminophen (PERCOCET) 5-325 mg per tablet Take 1 Tab by mouth every four (4) hours as needed for Pain. Max Daily Amount: 6 Tabs. 10 Tab 0    cyclobenzaprine (FLEXERIL) 5 mg tablet Take 1 Tab by mouth every twelve (12) hours as needed for Muscle Spasm(s). 20 Tab 0    ibuprofen (MOTRIN) 800 mg tablet Take 1 Tab by mouth three (3) times daily as needed for Pain. 60 Tab 0    lovastatin (MEVACOR) 20 mg tablet Take 1 Tab by mouth daily. 90 Tab 1    triamterene-hydroCHLOROthiazide (DYAZIDE) 37.5-25 mg per capsule Take 1 Cap by mouth daily. 90 Cap 1    gabapentin (NEURONTIN) 100 mg capsule Take 2 Caps by mouth three (3) times daily. 180 Cap 11    ferrous sulfate (IRON) 325 mg (65 mg iron) tablet Take 325 mg by mouth Daily (before breakfast). Indications: IRON DEFICIENCY ANEMIA         Review of Systems  Constitutional: The patient has no acute distress or discomfort. HEENT: The patient denies recent head trauma, eye pain, blurred vision,  hearing deficit, oropharyngeal mucosal pain or lesions, and the patient denies throat pain or discomfort. Lymphatics: The patient denies palpable peripheral lymphadenopathy. Hematologic: The patient denies having bruising, bleeding, or progressive fatigue. Respiratory: Patient denies having shortness of breath, cough, sputum production, fever, or dyspnea on exertion. Cardiovascular: The patient denies having leg pain, leg swelling, heart palpitations, chest permit, chest pain, or lightheadedness. The patient denies having dyspnea on exertion. Gastrointestinal: The patient denies having nausea, emesis, or diarrhea.  The patient denies having any hematemesis or blood in the stool.  Genitourinary: Patient denies having urinary urgency, frequency, or dysuria. The patient denies having blood in the urine. Psychological: The patient denies having symptoms of nervousness, anxiety, depression, or thoughts of harming self. Skin: Patient denies having skin rashes, skin, ulcerations, or unexplained itching or pruritus. Musculoskeletal: The patient denies having pain in the joints or bones. Objective:     Visit Vitals    /85    Pulse 68    Temp 98.5 °F (36.9 °C)    Ht 5' 4\" (1.626 m)    Wt 113.9 kg (251 lb)    BMI 43.08 kg/m2     ECOG PS=0, pain score=0/10   Physical Exam:   Gen. Appearance: The patient is in no acute distress. Skin: There is no bruise or rash. HEENT: The exam is unremarkable. Neck: Supple without lymphadenopathy or thyromegaly. Lungs: Clear to auscultation and percussion; there are no wheezes or rhonchi. Heart: Regular rate and rhythm; there are no murmurs, gallops, or rubs. Chest Wall and Axilla: No palpable masses, no evidence of disease recurrence. Abdomen: Bowel sounds are present and normal.  There is no guarding, tenderness, or hepatosplenomegaly. Extremities: There is no clubbing, cyanosis, or edema. Neurologic: There are no focal neurologic deficits. Lymphatics: There is no palpable peripheral lymphadenopathy. Musculoskeletal: The patient has full range of motion at all joints. There is no evidence of joint deformity or effusions. There is no focal joint tenderness. Psychological/psychiatric: There is no clinical evidence of anxiety, depression, or melancholy.     Lab data:      Results for orders placed or performed during the hospital encounter of 08/08/17   CBC WITH 3 PART DIFF     Status: Abnormal   Result Value Ref Range Status    WBC 6.4 4.5 - 13.0 K/uL Final    RBC 5.18 (H) 4.10 - 5.10 M/uL Final    HGB 12.3 12.0 - 16.0 g/dL Final    HCT 39.0 36 - 48 % Final    MCV 75.3 (L) 78 - 102 FL Final    MCH 23.7 (L) 25.0 - 35.0 PG Final MCHC 31.5 31 - 37 g/dL Final    RDW 13.3 11.5 - 14.5 % Final    PLATELET 788 298 - 699 K/uL Final    NEUTROPHILS 53 40 - 70 % Final    MIXED CELLS 10 0.1 - 17 % Final    LYMPHOCYTES 37 14 - 44 % Final    ABS. NEUTROPHILS 3.4 1.8 - 9.5 K/UL Final    ABS. MIXED CELLS 0.6 0.0 - 2.3 K/uL Final    ABS. LYMPHOCYTES 2.4 1.1 - 5.9 K/UL Final     Comment: Test performed at [place] [address] Location. Results Reviewed by Medical Director. DF AUTOMATED   Final           Assessment:     1. Malignant neoplasm of female breast, unspecified laterality, unspecified site of breast ([facility] [address]    2. Invasive ductal carcinoma of right breast, stage 1 (HCC)    3. Chemotherapy-induced peripheral neuropathy ([facility] [address]    4. Arthritis      Plan:     DCIS left breast/invasive ductal carcinoma right breast the patient is doing well and clinically there is no evidence of disease recurrence. I have instructed the patient to continue doing her chest wall and axilla exam on a monthly basis. Chemotherapy-induced peripheral neuropathy: I have instructed the patient to continue taking the Neurontin 200 mg 3 times daily. She also takes Percocet 5 mg every 4-6 hours p.r.n. I will renew her Rx today. Additionally, she takes Motrin 800 mg 3 times daily with food. She will continue to take these medications as previously noted. Arthritis: I have instructed the patient to continue using her Motrin and exercises as needed for arthritis. She also takes the Percocet 5 mg every 4-6 hours as well and this offers some benefit. I plan to see the patient in 3 months or sooner if indicated.     Orders Placed This Encounter    COMPLETE CBC & AUTO DIFF WBC    InHouse CBC ([place])     Standing Status:   Future     Number of Occurrences:   1     Standing Expiration Date:   3/81/1843    METABOLIC PANEL, COMPREHENSIVE     Standing Status:   Future     Number of Occurrences:   1     Standing Expiration Date:   8/9/2018    CA 27.29     Standing Status: Future     Number of Occurrences:   1     Standing Expiration Date:   8/9/2018    oxyCODONE-acetaminophen (PERCOCET) 5-325 mg per tablet     Sig: Take 1 Tab by mouth every four (4) hours as needed for Pain. Max Daily Amount: 6 Tabs.      Dispense:  10 Tab     Refill:  0       Christy Hinojosa MD  8/8/2017

## 2017-08-08 NOTE — PATIENT INSTRUCTIONS
Breast Cancer: Care Instructions  Your Care Instructions  Breast cancer occurs when abnormal cells grow out of control in the breast. These cancer cells can spread within the breast, to nearby lymph nodes and other tissues, and to other parts of the body. Being treated for cancer can weaken your body, and you may feel very tired. Get the rest your body needs so you can feel better. Finding out that you have cancer is scary. You may feel many emotions and may need some help coping. Seek out family, friends, and counselors for support. You also can do things at home to make yourself feel better while you go through treatment. Call the [employer] (4-262.114.2330) or visit its website at [employer] for more information. Follow-up care is a key part of your treatment and safety. Be sure to make and go to all appointments, and call your doctor if you are having problems. It's also a good idea to know your test results and keep a list of the medicines you take. How can you care for yourself at home? · Take your medicines exactly as prescribed. Call your doctor if you think you are having a problem with your medicine. You may get medicine for nausea and vomiting if you have these side effects. · Follow your doctor's instructions to relieve pain. Pain from cancer and surgery can almost always be controlled. Use pain medicine when you first notice pain, before it becomes severe. · Eat healthy food. If you do not feel like eating, try to eat food that has protein and extra calories to keep up your strength and prevent weight loss. Drink liquid meal replacements for extra calories and protein. Try to eat your main meal early. · Get some physical activity every day, but do not get too tired. Keep doing the hobbies you enjoy as your energy allows. · Do not smoke. Smoking can make your cancer worse. If you need help quitting, talk to your doctor about stop-smoking programs and medicines.  These can increase your chances of quitting for good. · Take steps to control your stress and workload. Learn relaxation techniques. ¨ Share your feelings. Stress and tension affect our emotions. By expressing your feelings to others, you may be able to understand and cope with them. ¨ Consider joining a support group. Talking about a problem with your spouse, a good friend, or other people with similar problems is a good way to reduce tension and stress. ¨ Express yourself through art. Try writing, crafts, dance, or art to relieve stress. Some dance, writing, or art groups may be available just for people who have cancer. ¨ Be kind to your body and mind. Getting enough sleep, eating a healthy diet, and taking time to do things you enjoy can contribute to an overall feeling of balance in your life and can help reduce stress. ¨ Get help if you need it. Discuss your concerns with your doctor or counselor. · If you are vomiting or have diarrhea:  ¨ Drink plenty of fluids (enough so that your urine is light yellow or clear like water) to prevent dehydration. Choose water and other caffeine-free clear liquids. If you have kidney, heart, or liver disease and have to limit fluids, talk with your doctor before you increase the amount of fluids you drink. ¨ When you are able to eat, try clear soups, mild foods, and liquids until all symptoms are gone for 12 to 48 hours. Other good choices include dry toast, crackers, cooked cereal, and gelatin dessert, such as Jell-O.  · If you have not already done so, prepare a list of advance directives. Advance directives are instructions to your doctor and family members about what kind of care you want if you become unable to speak or express yourself. When should you call for help? Call your doctor now or seek immediate medical care if:  · You have a fever. · Any part of your breast becomes red, tender, swollen, or hot.   · You have pain, redness, or swelling in the arm on the same side as your breast cancer. Watch closely for changes in your health, and be sure to contact your doctor if:  · You have pain that is not controlled by medicine. · You have nausea or vomiting. · You are constipated or have diarrhea. Where can you learn more? Go to http://hanna-dharmesh.info/. Enter V321 in the search box to learn more about \"Breast Cancer: Care Instructions. \"  Current as of: July 26, 2016  Content Version: 11.3  © 6559-3357 [employer]. Care instructions adapted under license by [employer] (which disclaims liability or warranty for this information). If you have questions about a medical condition or this instruction, always ask your healthcare professional. [address] any warranty or liability for your use of this information.

## 2017-08-08 NOTE — MR AVS SNAPSHOT
Visit Information Date & Time Provider Department Dept. Phone Encounter #  
 8/8/2017 10:15 AM [facility] Office [phone] [id number] Follow-up Instructions Return in about 3 months (around 11/8/2017). Your Appointments 9/29/2017 10:00 AM  
FOLLOW UP EXAM with Sol Bhakta MD  
[facility] [address]) Appt Note: 6 month f/u  
 [address] [facility] [address]  
[facility] [address]  
  
    
 11/7/2017 11:30 AM  
Office Visit with Marcos Patel MD  
[place] [address] ([address]) Appt Note: OV  
 [place] [address] [facility] [address] [facility] [address]  
[phone]  
  
   
 [facility] [address] Upcoming Health Maintenance Date Due Pneumococcal 19-64 Highest Risk (1 of 3 - PCV13) 1/11/1979 DTaP/Tdap/Td series (1 - Tdap) 1/11/1981 FOBT Q 1 YEAR AGE 50-75 3/8/2017 INFLUENZA AGE 9 TO ADULT 8/1/2017 PAP AKA CERVICAL CYTOLOGY 12/10/2018 BREAST CANCER SCRN MAMMOGRAM 3/10/2019 Allergies as of 8/8/2017  Review Complete On: 8/1/2017 By: Swapnil Vivas MD  
 No Known Allergies Current Immunizations  Reviewed on 9/15/2015 Name Date Influenza Vaccine 9/15/2015, 11/18/2014 Not reviewed this visit You Were Diagnosed With   
  
 Codes Comments Malignant neoplasm of female breast, unspecified laterality, unspecified site of breast ([facility]    -  Primary ICD-10-CM: C50.919 ICD-9-CM: 174.9 Invasive ductal carcinoma of right breast, stage 1 ([facility]     ICD-10-CM: C50.911 ICD-9-CM: 174.9 Chemotherapy-induced peripheral neuropathy (HCC)     ICD-10-CM: G62.0, T45.1X5A 
ICD-9-CM: 357.7, E933.1 Arthritis     ICD-10-CM: M19.90 ICD-9-CM: 716.90 Vitals BP Pulse Temp Height(growth percentile) Weight(growth percentile) BMI 141/85 68 98.5 °F (36.9 °C) 5' 4\" (1.626 m) 251 lb (113.9 kg) 43.08 kg/m2 OB Status Smoking Status Postmenopausal Former Smoker BMI and BSA Data Body Mass Index Body Surface Area 43.08 kg/m 2 2.27 m 2 Preferred Pharmacy Pharmacy Name Phone [facility] [address], [address] [place] [address] Avenue [phone] Your Updated Medication List  
  
   
This list is accurate as of: 8/8/17 10:56 AM.  Always use your most recent med list.  
  
  
  
  
 cyclobenzaprine 5 mg tablet Commonly known as:  FLEXERIL Take 1 Tab by mouth every twelve (12) hours as needed for Muscle Spasm(s). gabapentin 100 mg capsule Commonly known as:  NEURONTIN Take 2 Caps by mouth three (3) times daily. ibuprofen 800 mg tablet Commonly known as:  MOTRIN Take 1 Tab by mouth three (3) times daily as needed for Pain. Iron 325 mg (65 mg iron) tablet Generic drug:  ferrous sulfate Take 325 mg by mouth Daily (before breakfast). Indications: IRON DEFICIENCY ANEMIA  
  
 lovastatin 20 mg tablet Commonly known as:  MEVACOR Take 1 Tab by mouth daily. oxyCODONE-acetaminophen 5-325 mg per tablet Commonly known as:  PERCOCET Take 1 Tab by mouth every four (4) hours as needed for Pain. Max Daily Amount: 6 Tabs. triamterene-hydroCHLOROthiazide 37.5-25 mg per capsule Commonly known as:  Feng Speak Take 1 Cap by mouth daily. Prescriptions Printed Refills  
 oxyCODONE-acetaminophen (PERCOCET) 5-325 mg per tablet 0 Sig: Take 1 Tab by mouth every four (4) hours as needed for Pain. Max Daily Amount: 6 Tabs. Class: Print Route: Oral  
  
We Performed the Following COMPLETE CBC & AUTO DIFF WBC [71402 CPT(R)] Follow-up Instructions Return in about 3 months (around 11/8/2017). To-Do List   
 08/08/2017 Lab:  CA 27.29   
  
 08/08/2017 Lab:  CBC WITH 3 PART DIFF   
  
 08/08/2017 Lab: METABOLIC PANEL, COMPREHENSIVE Patient Instructions Breast Cancer: Care Instructions Your Care Instructions Breast cancer occurs when abnormal cells grow out of control in the breast. These cancer cells can spread within the breast, to nearby lymph nodes and other tissues, and to other parts of the body. Being treated for cancer can weaken your body, and you may feel very tired. Get the rest your body needs so you can feel better. Finding out that you have cancer is scary. You may feel many emotions and may need some help coping. Seek out family, friends, and counselors for support. You also can do things at home to make yourself feel better while you go through treatment. Call the [employer] (2-449.221.6149) or visit its website at [employer] for more information. Follow-up care is a key part of your treatment and safety. Be sure to make and go to all appointments, and call your doctor if you are having problems. It's also a good idea to know your test results and keep a list of the medicines you take. How can you care for yourself at home? · Take your medicines exactly as prescribed. Call your doctor if you think you are having a problem with your medicine. You may get medicine for nausea and vomiting if you have these side effects. · Follow your doctor's instructions to relieve pain. Pain from cancer and surgery can almost always be controlled. Use pain medicine when you first notice pain, before it becomes severe. · Eat healthy food. If you do not feel like eating, try to eat food that has protein and extra calories to keep up your strength and prevent weight loss. Drink liquid meal replacements for extra calories and protein. Try to eat your main meal early. · Get some physical activity every day, but do not get too tired. Keep doing the hobbies you enjoy as your energy allows. · Do not smoke. Smoking can make your cancer worse.  If you need help quitting, talk to your doctor about stop-smoking programs and medicines. These can increase your chances of quitting for good. · Take steps to control your stress and workload. Learn relaxation techniques. ¨ Share your feelings. Stress and tension affect our emotions. By expressing your feelings to others, you may be able to understand and cope with them. ¨ Consider joining a support group. Talking about a problem with your spouse, a good friend, or other people with similar problems is a good way to reduce tension and stress. ¨ Express yourself through art. Try writing, crafts, dance, or art to relieve stress. Some dance, writing, or art groups may be available just for people who have cancer. ¨ Be kind to your body and mind. Getting enough sleep, eating a healthy diet, and taking time to do things you enjoy can contribute to an overall feeling of balance in your life and can help reduce stress. ¨ Get help if you need it. Discuss your concerns with your doctor or counselor. · If you are vomiting or have diarrhea: ¨ Drink plenty of fluids (enough so that your urine is light yellow or clear like water) to prevent dehydration. Choose water and other caffeine-free clear liquids. If you have kidney, heart, or liver disease and have to limit fluids, talk with your doctor before you increase the amount of fluids you drink. ¨ When you are able to eat, try clear soups, mild foods, and liquids until all symptoms are gone for 12 to 48 hours. Other good choices include dry toast, crackers, cooked cereal, and gelatin dessert, such as Jell-O. · If you have not already done so, prepare a list of advance directives. Advance directives are instructions to your doctor and family members about what kind of care you want if you become unable to speak or express yourself. When should you call for help? Call your doctor now or seek immediate medical care if: 
· You have a fever. · Any part of your breast becomes red, tender, swollen, or hot. · You have pain, redness, or swelling in the arm on the same side as your breast cancer. Watch closely for changes in your health, and be sure to contact your doctor if: 
· You have pain that is not controlled by medicine. · You have nausea or vomiting. · You are constipated or have diarrhea. Where can you learn more? Go to http://hanna-dharmesh.info/. Enter V321 in the search box to learn more about \"Breast Cancer: Care Instructions. \" Current as of: July 26, 2016 Content Version: 11.3 © 5141-8585 [employer]. Care instructions adapted under license by [employer] (which disclaims liability or warranty for this information). If you have questions about a medical condition or this instruction, always ask your healthcare professional. [address] any warranty or liability for your use of this information. Introducing [facility]! [employer] introduces [employer] patient portal. Now you can access parts of your medical record, email your doctor's office, and request medication refills online. 1. In your internet browser, go to [email or web address] 2. Click on the First Time User? Click Here link in the Sign In box. You will see the New Member Sign Up page. 3. Enter your [employer] Access Code exactly as it appears below. You will not need to use this code after youve completed the sign-up process. If you do not sign up before the expiration date, you must request a new code. · [employer] Access Code: [email or web address] Expires: 9/9/2017  8:06 PM 
 
4. Enter the last four digits of your Social Security Number (xxxx) and Date of Birth (mm/dd/yyyy) as indicated and click Submit. You will be taken to the next sign-up page. 5. Create a [employer] ID.  This will be your [employer] login ID and cannot be changed, so think of one that is secure and easy to remember. 6. Create a [employer] password. You can change your password at any time. 7. Enter your Password Reset Question and Answer. This can be used at a later time if you forget your password. 8. Enter your e-mail address. You will receive e-mail notification when new information is available in [address] 9. Click Sign Up. You can now view and download portions of your medical record. 10. Click the Download Summary menu link to download a portable copy of your medical information. If you have questions, please visit the Frequently Asked Questions section of the [employer] website. Remember, [employer] is NOT to be used for urgent needs. For medical emergencies, dial 911. Now available from your iPhone and Android! Please provide this summary of care documentation to your next provider. Your primary care clinician is listed as Herbie Sosa. If you have any questions after today's visit, please call [phone].

## 2017-08-09 LAB
ALBUMIN SERPL-MCNC: 4 G/DL (ref 3.5–5.5)
ALBUMIN/GLOB SERPL: 1.1 {RATIO} (ref 1.2–2.2)
ALP SERPL-CCNC: 81 IU/L (ref 39–117)
ALT SERPL-CCNC: 15 IU/L (ref 0–32)
AST SERPL-CCNC: 16 IU/L (ref 0–40)
BILIRUB SERPL-MCNC: 0.4 MG/DL (ref 0–1.2)
BUN SERPL-MCNC: 11 MG/DL (ref 6–24)
BUN/CREAT SERPL: 16 (ref 9–23)
CALCIUM SERPL-MCNC: 9.4 MG/DL (ref 8.7–10.2)
CANCER AG27-29 SERPL-ACNC: 23.4 U/ML (ref 0–38.6)
CHLORIDE SERPL-SCNC: 95 MMOL/L (ref 96–106)
CO2 SERPL-SCNC: 28 MMOL/L (ref 18–29)
CREAT SERPL-MCNC: 0.67 MG/DL (ref 0.57–1)
GLOBULIN SER CALC-MCNC: 3.7 G/DL (ref 1.5–4.5)
GLUCOSE SERPL-MCNC: 95 MG/DL (ref 65–99)
POTASSIUM SERPL-SCNC: 3.8 MMOL/L (ref 3.5–5.2)
PROT SERPL-MCNC: 7.7 G/DL (ref 6–8.5)
SODIUM SERPL-SCNC: 139 MMOL/L (ref 134–144)

## 2017-09-29 ENCOUNTER — OFFICE VISIT (OUTPATIENT)
Dept: FAMILY MEDICINE CLINIC | Age: 57
End: 2017-09-29

## 2017-09-29 VITALS
DIASTOLIC BLOOD PRESSURE: 68 MMHG | OXYGEN SATURATION: 98 % | TEMPERATURE: 99 F | HEIGHT: 64 IN | RESPIRATION RATE: 18 BRPM | HEART RATE: 71 BPM | SYSTOLIC BLOOD PRESSURE: 112 MMHG | BODY MASS INDEX: 43.54 KG/M2 | WEIGHT: 255 LBS

## 2017-09-29 DIAGNOSIS — Z23 ENCOUNTER FOR IMMUNIZATION: ICD-10-CM

## 2017-09-29 DIAGNOSIS — M19.90 ARTHRITIS: ICD-10-CM

## 2017-09-29 DIAGNOSIS — I10 ESSENTIAL HYPERTENSION: Primary | ICD-10-CM

## 2017-09-29 DIAGNOSIS — E78.2 MIXED HYPERLIPIDEMIA: Chronic | ICD-10-CM

## 2017-09-29 RX ORDER — MELOXICAM 15 MG/1
15 TABLET ORAL DAILY
Qty: 30 TAB | Refills: 3 | Status: SHIPPED | OUTPATIENT
Start: 2017-09-29 | End: 2018-01-29 | Stop reason: SDUPTHER

## 2017-09-29 NOTE — PROGRESS NOTES
Chronic Illness Visit    Today's Date:  2017   Patient's Name: Ashlie Edwards   Patient's :  1960     History:     Chief Complaint   Patient presents with    Follow Up Chronic Condition     HTN, doing well       Ashlie Edwards is a 62 y.o. female presenting for a follow up of Chronic Illness. Hypertension/Hyperlipidemia    BP is well controlled today <140/80. Patients risk factors include: Obesity   Patient is not checking home BP   Reports compliance and denies side effects to medications   Daily exercise and diet are as follows: reports eating very little but is walking a couple of days a wekk  Weight is stable since the last visit  Takes the below meds regularly with no side effects. Last LDL: 125 ()    Osteoarthritis    Onset: worsened about 6 months ago. Occurs in the knees, shoulder and left foot/ankle  Worst after excessive walking or extended sitting. Some mild swelling. Denies acute injuries or falls. Patient usually takes NSAID for pain relief  At worst pain can be 10/10. Reports that it feels stiff.       Past Medical History:   Diagnosis Date    Cancer [facility])     right breast cancer     DDD (degenerative disc disease), cervical 2017    Hypercholesterolemia     Hypertension      Past Surgical History:   Procedure Laterality Date    HX BREAST RECONSTRUCTION  2014    BILATERAL BREAST RECONSTRUCTION WITH TISSUE EXPANDERS performed by Maddi Narayanan MD at [facility] MAIN OR     [address]      HX CHOLECYSTECTOMY      HX GYN      fibroid tumors removed    HX MASTECTOMY  2014    BILATERAL BREAST MASTECTOMY SIMPLE performed by Romaine Morejon MD at [facility] MAIN OR    HX TUBAL LIGATION      HX VASCULAR ACCESS      left chest     Social History     Social History    Marital status:      Spouse name: N/A    Number of children: N/A    Years of education: N/A     Social History Main Topics    Smoking status: Former Smoker     Types: Cigarettes    Smokeless tobacco: Never Used      Comment: quit 6/2014    Alcohol use No      Comment: Socially    Drug use: No    Sexual activity: Not Currently     Other Topics Concern    None     Social History Narrative     Family History   Problem Relation Age of Onset    Hypertension Mother     Diabetes Father     Diabetes Daughter     Diabetes Son      No Known Allergies    Problem List:      Patient Active Problem List   Diagnosis Code    HTN (hypertension) I10    Hyperlipidemia E78.5    Breast lump N63    Abnormal finding on mammography, microcalcification R92.0    DCIS (ductal carcinoma in situ) of breast D05.10    Breast cancer ([facility] C50.919    Status post partial mastectomy of both breasts Z90.13    Cellulitis L03.90    Back pain M54.9    Anemia D64.9    Mucositis K12.30    Arthritis M19.90    Neuropathy ([facility] G62.9    Chemotherapy-induced peripheral neuropathy ([facility] G62.0, T45.1X5A    Ductal carcinoma in situ (DCIS) of left breast D05.12    In[facility] C50.911       Medications:     Current Outpatient Prescriptions   Medication Sig    meloxicam (MOBIC) 15 mg tablet Take 1 Tab by mouth daily.  lovastatin (MEVACOR) 20 mg tablet Take 1 Tab by mouth daily.  triamterene-hydroCHLOROthiazide (DYAZIDE) 37.5-25 mg per capsule Take 1 Cap by mouth daily.  gabapentin (NEURONTIN) 100 mg capsule Take 2 Caps by mouth three (3) times daily.  ferrous sulfate (IRON) 325 mg (65 mg iron) tablet Take 325 mg by mouth Daily (before breakfast). Indications: IRON DEFICIENCY ANEMIA     No current facility-administered medications for this visit.           Constitutional: negative for fevers, chills, sweats and fatigue  Respiratory: negative for cough, sputum or wheezing  Cardiovascular: negative for chest pain, chest pressure/discomfort, dyspnea  Gastrointestinal: negative for nausea, vomiting, diarrhea, constipation and abdominal pain  Musculoskeletal:positive for arthralgias, negative for myalgias  Neurological: negative for headaches and dizziness  Behavioral/Psych: negative for anxiety and depression    Physical Assessment:   VS:    Visit Vitals    /68 (BP 1 Location: Left arm, BP Patient Position: Sitting)    Pulse 71    Temp 99 °F (37.2 °C) (Oral)    Resp 18    Ht 5' 4\" (1.626 m)    Wt 255 lb (115.7 kg)    SpO2 98%    BMI 43.77 kg/m2       Lab Results   Component Value Date/Time    Sodium 139 08/08/2017 10:24 AM    Potassium 3.8 08/08/2017 10:24 AM    Chloride 95 08/08/2017 10:24 AM    CO2 28 08/08/2017 10:24 AM    Anion gap 6 05/22/2017 01:10 PM    Glucose 95 08/08/2017 10:24 AM    BUN 11 08/08/2017 10:24 AM    Creatinine 0.67 08/08/2017 10:24 AM    BUN/Creatinine ratio 16 08/08/2017 10:24 AM    GFR est  08/08/2017 10:24 AM    GFR est non-AA 98 08/08/2017 10:24 AM    Calcium 9.4 08/08/2017 10:24 AM       General:   Well-groomed, obese, in no distress, pleasant, alert, appropriate and conversant. Mouth:  Good dentition, oropharynx WNL without membranes, exudates, petechiae or ulcers  Cardiovasc:   RRR, no MRG. Pulses 2+ and symmetric at distal extremities. Pulmonary:   Lungs clear bilaterally. Normal respiratory effort. Extremities:   no edema on the left ankle, no TTP bilateral calves. LEs warm and well-perfused. Neuro:   Alert and oriented, no focal deficits. No facial asymmetry noted. Psych:  No pressured speech or abnormal thought content        Assessment/Plan & Orders:       1. Essential hypertension    2. Mixed hyperlipidemia    3. Arthritis    4. Encounter for immunization        Orders Placed This Encounter    [place] IMMUNIZ ADMIN,1 SINGLE/COMB VAC/TOXOID    INFLUENZA VIRUS VACCINE QUADRIVALENT, PRESERVATIVE FREE SYRINGE (52958)    meloxicam (MOBIC) 15 mg tablet       Hypertension   -BP is well controlled would like patient to continue current meds. Hyperlipidemia  -stable continue w/ statin.  Reiterated the importance of patient completing Lipid panel. Labs ordered once again today.     Arthritis plan to send in meloxicam    Obesity stable since the last visit  counseled on obesity, risks of being obese, commercial diet plans, calorie counting, food journaling, exercise, follow up for weight checks       Follow up in 3-6 months    Carolina Win MD  Family Medicine  9/29/2017  10:00 AM

## 2017-09-29 NOTE — MR AVS SNAPSHOT
Visit Information Date & Time Provider Department Dept. Phone Encounter #  
 9/29/2017 10:00 AM Yosvany KaufmanWan [phone] [id number] Your Appointments 11/7/2017 11:30 AM  
Office Visit with MD Sandra Tannerbenedict [address] [place] [address]) Appt Note: OV  
 [place] [address] [facility] [address] [facility] [address]  
[phone]  
  
   
 [place] [address] Upcoming Health Maintenance Date Due Pneumococcal 19-64 Highest Risk (1 of 3 - PCV13) 1/11/1979 DTaP/Tdap/Td series (1 - Tdap) 1/11/1981 FOBT Q 1 YEAR AGE 50-75 3/8/2017 INFLUENZA AGE 9 TO ADULT 8/1/2017 PAP AKA CERVICAL CYTOLOGY 12/10/2018 BREAST CANCER SCRN MAMMOGRAM 3/10/2019 Allergies as of 9/29/2017  Review Complete On: 9/29/2017 By: Yosvany Kaufman MD  
 No Known Allergies Current Immunizations  Reviewed on 9/15/2015 Name Date Influenza Vaccine 9/15/2015, 11/18/2014 Not reviewed this visit You Were Diagnosed With   
  
 Codes Comments Essential hypertension    -  Primary ICD-10-CM: I10 
ICD-9-CM: 401.9 Mixed hyperlipidemia     ICD-10-CM: E78.2 ICD-9-CM: 272.2 Arthritis     ICD-10-CM: M19.90 ICD-9-CM: 716.90 Vitals BP Pulse Temp Resp Height(growth percentile) Weight(growth percentile) 112/68 (BP 1 Location: Left arm, BP Patient Position: Sitting) 71 99 °F (37.2 °C) (Oral) 18 5' 4\" (1.626 m) 255 lb (115.7 kg) SpO2 BMI OB Status Smoking Status 98% 43.77 kg/m2 Postmenopausal Former Smoker Vitals History BMI and BSA Data Body Mass Index Body Surface Area 43.77 kg/m 2 2.29 m 2 Preferred Pharmacy Pharmacy Name Phone [facility] [address] [phone] Your Updated Medication List  
  
   
This list is accurate as of: 9/29/17 10:27 AM.  Always use your most recent med list.  
  
  
  
  
 cyclobenzaprine 5 mg tablet Commonly known as:  FLEXERIL Take 1 Tab by mouth every twelve (12) hours as needed for Muscle Spasm(s). gabapentin 100 mg capsule Commonly known as:  NEURONTIN Take 2 Caps by mouth three (3) times daily. ibuprofen 800 mg tablet Commonly known as:  MOTRIN Take 1 Tab by mouth three (3) times daily as needed for Pain. Iron 325 mg (65 mg iron) tablet Generic drug:  ferrous sulfate Take 325 mg by mouth Daily (before breakfast). Indications: IRON DEFICIENCY ANEMIA  
  
 lovastatin 20 mg tablet Commonly known as:  MEVACOR Take 1 Tab by mouth daily. meloxicam 15 mg tablet Commonly known as:  MOBIC Take 1 Tab by mouth daily. oxyCODONE-acetaminophen 5-325 mg per tablet Commonly known as:  PERCOCET Take 1 Tab by mouth every four (4) hours as needed for Pain. Max Daily Amount: 6 Tabs. triamterene-hydroCHLOROthiazide 37.5-25 mg per capsule Commonly known as:  Jenna Basque Take 1 Cap by mouth daily. Prescriptions Sent to Pharmacy Refills  
 meloxicam (MOBIC) 15 mg tablet 3 Sig: Take 1 Tab by mouth daily. Class: Normal  
 Pharmacy: [id number] #: [phone] Route: Oral  
  
Patient Instructions Arthritis: Care Instructions Your Care Instructions Arthritis, also called osteoarthritis, is a breakdown of the cartilage that cushions your joints. When the cartilage wears down, your bones rub against each other. This causes pain and stiffness. Many people have some arthritis as they age. Arthritis most often affects the joints of the spine, hands, hips, knees, or feet. You can take simple measures to protect your joints, ease your pain, and help you stay active. Follow-up care is a key part of your treatment and safety.  Be sure to make and go to all appointments, and call your doctor if you are having problems. It's also a good idea to know your test results and keep a list of the medicines you take. How can you care for yourself at home? · Stay at a healthy weight. Being overweight puts extra strain on your joints. · Talk to your doctor or physical therapist about exercises that will help ease joint pain. ¨ Stretch. You may enjoy gentle forms of yoga to help keep your joints and muscles flexible. ¨ Walk instead of jog. Other types of exercise that are less stressful on the joints include riding a bicycle, swimming, nancy chi, or water exercise. ¨ Lift weights. Strong muscles help reduce stress on your joints. Stronger thigh muscles, for example, take some of the stress off of the knees and hips. Learn the right way to lift weights so you do not make joint pain worse. · Take your medicines exactly as prescribed. Call your doctor if you think you are having a problem with your medicine. · Take pain medicines exactly as directed. ¨ If the doctor gave you a prescription medicine for pain, take it as prescribed. ¨ If you are not taking a prescription pain medicine, ask your doctor if you can take an over-the-counter medicine. · Use a cane, crutch, walker, or another device if you need help to get around. These can help rest your joints. You also can use other things to make life easier, such as a higher toilet seat and padded handles on kitchen utensils. · Do not sit in low chairs, which can make it hard to get up. · Put heat or cold on your sore joints as needed. Use whichever helps you most. You also can take turns with hot and cold packs. ¨ Apply heat 2 or 3 times a day for 20 to 30 minutesusing a heating pad, hot shower, or hot packto relieve pain and stiffness. ¨ Put ice or a cold pack on your sore joint for 10 to 20 minutes at a time. Put a thin cloth between the ice and your skin. When should you call for help? Call your doctor now or seek immediate medical care if: · You have sudden swelling, warmth, or pain in any joint. · You have joint pain and a fever or rash. · You have such bad pain that you cannot use a joint. Watch closely for changes in your health, and be sure to contact your doctor if: 
· You have mild joint symptoms that continue even with more than 6 weeks of care at home. · You have stomach pain or other problems with your medicine. Where can you learn more? Go to http://hanna-dharmesh.info/. Enter R630 in the search box to learn more about \"Arthritis: Care Instructions. \" Current as of: November 28, 2016 Content Version: 11.3 © 8820-3819 Athlete Builder. Care instructions adapted under license by [employer] (which disclaims liability or warranty for this information). If you have questions about a medical condition or this instruction, always ask your healthcare professional. [address] any warranty or liability for your use of this information. Introducing [facility]! [employer] introduces [employer] patient portal. Now you can access parts of your medical record, email your doctor's office, and request medication refills online. 1. In your internet browser, go to [email or web address] 2. Click on the First Time User? Click Here link in the Sign In box. You will see the New Member Sign Up page. 3. Enter your [employer] Access Code exactly as it appears below. You will not need to use this code after youve completed the sign-up process. If you do not sign up before the expiration date, you must request a new code. · [employer] Access Code: [email or web address] Expires: 12/28/2017 10:27 AM 
 
4. Enter the last four digits of your Social Security Number (xxxx) and Date of Birth (mm/dd/yyyy) as indicated and click Submit. You will be taken to the next sign-up page. 5. Create a Zhilabs ID.  This will be your Zhilabs login ID and cannot be changed, so think of one that is secure and easy to remember. 6. Create a Share Practice password. You can change your password at any time. 7. Enter your Password Reset Question and Answer. This can be used at a later time if you forget your password. 8. Enter your e-mail address. You will receive e-mail notification when new information is available in [address] 9. Click Sign Up. You can now view and download portions of your medical record. 10. Click the Download Summary menu link to download a portable copy of your medical information. If you have questions, please visit the Frequently Asked Questions section of the Share Practice website. Remember, Share Practice is NOT to be used for urgent needs. For medical emergencies, dial 911. Now available from your iPhone and Android! Please provide this summary of care documentation to your next provider. Your primary care clinician is listed as Holly Shook. If you have any questions after today's visit, please call [phone].

## 2017-09-29 NOTE — PATIENT INSTRUCTIONS
Arthritis: Care Instructions  Your Care Instructions  Arthritis, also called osteoarthritis, is a breakdown of the cartilage that cushions your joints. When the cartilage wears down, your bones rub against each other. This causes pain and stiffness. Many people have some arthritis as they age. Arthritis most often affects the joints of the spine, hands, hips, knees, or feet. You can take simple measures to protect your joints, ease your pain, and help you stay active. Follow-up care is a key part of your treatment and safety. Be sure to make and go to all appointments, and call your doctor if you are having problems. It's also a good idea to know your test results and keep a list of the medicines you take. How can you care for yourself at home? · Stay at a healthy weight. Being overweight puts extra strain on your joints. · Talk to your doctor or physical therapist about exercises that will help ease joint pain. ¨ Stretch. You may enjoy gentle forms of yoga to help keep your joints and muscles flexible. ¨ Walk instead of jog. Other types of exercise that are less stressful on the joints include riding a bicycle, swimming, nancy chi, or water exercise. ¨ Lift weights. Strong muscles help reduce stress on your joints. Stronger thigh muscles, for example, take some of the stress off of the knees and hips. Learn the right way to lift weights so you do not make joint pain worse. · Take your medicines exactly as prescribed. Call your doctor if you think you are having a problem with your medicine. · Take pain medicines exactly as directed. ¨ If the doctor gave you a prescription medicine for pain, take it as prescribed. ¨ If you are not taking a prescription pain medicine, ask your doctor if you can take an over-the-counter medicine. · Use a cane, crutch, walker, or another device if you need help to get around. These can help rest your joints.  You also can use other things to make life easier, such as a higher toilet seat and padded handles on kitchen utensils. · Do not sit in low chairs, which can make it hard to get up. · Put heat or cold on your sore joints as needed. Use whichever helps you most. You also can take turns with hot and cold packs. ¨ Apply heat 2 or 3 times a day for 20 to 30 minutes--using a heating pad, hot shower, or hot pack--to relieve pain and stiffness. ¨ Put ice or a cold pack on your sore joint for 10 to 20 minutes at a time. Put a thin cloth between the ice and your skin. When should you call for help? Call your doctor now or seek immediate medical care if:  · You have sudden swelling, warmth, or pain in any joint. · You have joint pain and a fever or rash. · You have such bad pain that you cannot use a joint. Watch closely for changes in your health, and be sure to contact your doctor if:  · You have mild joint symptoms that continue even with more than 6 weeks of care at home. · You have stomach pain or other problems with your medicine. Where can you learn more? Go to http://hanna-dharmesh.info/. Enter H720 in the search box to learn more about \"Arthritis: Care Instructions. \"  Current as of: November 28, 2016  Content Version: 11.3  © 2480-6105 Infer. Care instructions adapted under license by [employer] (which disclaims liability or warranty for this information). If you have questions about a medical condition or this instruction, always ask your healthcare professional. [place] [address] any warranty or liability for your use of this information.

## 2017-11-07 ENCOUNTER — OFFICE VISIT (OUTPATIENT)
Dept: ONCOLOGY | Age: 57
End: 2017-11-07

## 2017-11-07 ENCOUNTER — HOSPITAL ENCOUNTER (OUTPATIENT)
Dept: ONCOLOGY | Age: 57
Discharge: HOME | End: 2017-11-07

## 2017-11-07 VITALS
WEIGHT: 255.8 LBS | BODY MASS INDEX: 43.91 KG/M2 | DIASTOLIC BLOOD PRESSURE: 88 MMHG | SYSTOLIC BLOOD PRESSURE: 139 MMHG | TEMPERATURE: 98.6 F | HEART RATE: 77 BPM

## 2017-11-07 DIAGNOSIS — D64.9 ANEMIA, UNSPECIFIED TYPE: ICD-10-CM

## 2017-11-07 DIAGNOSIS — I10 ESSENTIAL HYPERTENSION WITH GOAL BLOOD PRESSURE LESS THAN 140/90: ICD-10-CM

## 2017-11-07 DIAGNOSIS — C50.911 INVASIVE DUCTAL CARCINOMA OF RIGHT BREAST, STAGE 1 (HCC): ICD-10-CM

## 2017-11-07 DIAGNOSIS — D05.10 DUCTAL CARCINOMA IN SITU (DCIS) OF BREAST, UNSPECIFIED LATERALITY: Primary | ICD-10-CM

## 2017-11-07 DIAGNOSIS — D05.10 DUCTAL CARCINOMA IN SITU (DCIS) OF BREAST, UNSPECIFIED LATERALITY: ICD-10-CM

## 2017-11-07 LAB
BASO+EOS+MONOS # BLD AUTO: 0.4 K/UL (ref 0–2.3)
BASO+EOS+MONOS # BLD AUTO: 7 % (ref 0.1–17)
DIFFERENTIAL METHOD BLD: ABNORMAL
ERYTHROCYTE [DISTWIDTH] IN BLOOD BY AUTOMATED COUNT: 13.8 % (ref 11.5–14.5)
HCT VFR BLD AUTO: 40.9 % (ref 36–48)
HGB BLD-MCNC: 12.8 G/DL (ref 12–16)
LYMPHOCYTES # BLD: 2.3 K/UL (ref 1.1–5.9)
LYMPHOCYTES NFR BLD: 44 % (ref 14–44)
MCH RBC QN AUTO: 23.5 PG (ref 25–35)
MCHC RBC AUTO-ENTMCNC: 31.3 G/DL (ref 31–37)
MCV RBC AUTO: 75.2 FL (ref 78–102)
NEUTS SEG # BLD: 2.6 K/UL (ref 1.8–9.5)
NEUTS SEG NFR BLD: 49 % (ref 40–70)
PLATELET # BLD AUTO: 279 K/UL (ref 140–440)
RBC # BLD AUTO: 5.44 M/UL (ref 4.1–5.1)
WBC # BLD AUTO: 5.3 K/UL (ref 4.5–13)

## 2017-11-07 RX ORDER — TRIAMTERENE AND HYDROCHLOROTHIAZIDE 37.5; 25 MG/1; MG/1
1 CAPSULE ORAL DAILY
Qty: 90 CAP | Refills: 1 | Status: SHIPPED | OUTPATIENT
Start: 2017-11-07

## 2017-11-07 RX ORDER — OXYCODONE AND ACETAMINOPHEN 5; 325 MG/1; MG/1
1 TABLET ORAL
Qty: 60 TAB | Refills: 0 | Status: SHIPPED | OUTPATIENT
Start: 2017-11-07 | End: 2018-02-09 | Stop reason: SDUPTHER

## 2017-11-07 NOTE — MR AVS SNAPSHOT
Visit Information Date & Time Provider Department Dept. Phone Encounter #  
 11/7/2017 11:30 AM Ellie Garcia Al[facility]ha[facility] [address] Office [phone] [id number] Follow-up Instructions Return in about 3 months (around 2/7/2018). Your Appointments 2/9/2018 12:00 PM  
Office Visit with Ellie Garcia MD  
[facility] Appt Note: 3 mo fu  
 [place] [address] [address] [facility] [address]  
[phone]  
  
   
 [address]  
  
    
 3/29/2018  9:30 AM  
FOLLOW UP EXAM with MILLICENT Zarate [facility] ([address]) Appt Note: 6 month f/u  
 [address] [facility] [address]  
[facility] [address] Upcoming Health Maintenance Date Due Pneumococcal 19-64 Highest Risk (1 of 3 - PCV13) 1/11/1979 DTaP/Tdap/Td series (1 - Tdap) 1/11/1981 FOBT Q 1 YEAR AGE 50-75 3/8/2017 PAP AKA CERVICAL CYTOLOGY 12/10/2018 BREAST CANCER SCRN MAMMOGRAM 3/10/2019 Allergies as of 11/7/2017  Review Complete On: 9/29/2017 By: Boubacar Kennedy MD  
 No Known Allergies Current Immunizations  Reviewed on 9/15/2015 Name Date Influenza Vaccine 9/15/2015, 11/18/2014 Influenza Vaccine (Quad) PF 9/29/2017 Not reviewed this visit You Were Diagnosed With   
  
 Codes Comments Ductal carcinoma in situ (DCIS) of breast, unspecified laterality    -  Primary ICD-10-CM: D05.10 ICD-9-CM: 233.0 Anemia, unspecified type     ICD-10-CM: D64.9 ICD-9-CM: 285.9 Invasive ductal carcinoma of right breast, stage 1 ([facility] [address]     ICD-10-CM: C50.911 ICD-9-CM: 174.9 Vitals BP Pulse Temp Weight(growth percentile) BMI OB Status 139/88 (BP 1 Location: Left arm, BP Patient Position: Sitting) 77 98.6 °F (37 °C) (Oral) 255 lb 12.8 oz (116 kg) 43.91 kg/m2 Postmenopausal  
 Smoking Status Former Smoker BMI and BSA Data Body Mass Index Body Surface Area  
 43.91 kg/m 2 2.29 m 2 Preferred Pharmacy Pharmacy Name Phone [facility] [address], [address] [place] [address] Avenue [phone] Your Updated Medication List  
  
   
This list is accurate as of: 11/7/17 12:40 PM.  Always use your most recent med list.  
  
  
  
  
 gabapentin 100 mg capsule Commonly known as:  NEURONTIN Take 2 Caps by mouth three (3) times daily. Iron 325 mg (65 mg iron) tablet Generic drug:  ferrous sulfate Take 325 mg by mouth Daily (before breakfast). Indications: IRON DEFICIENCY ANEMIA  
  
 lovastatin 20 mg tablet Commonly known as:  MEVACOR Take 1 Tab by mouth daily. meloxicam 15 mg tablet Commonly known as:  MOBIC Take 1 Tab by mouth daily. oxyCODONE-acetaminophen 5-325 mg per tablet Commonly known as:  PERCOCET Take 1 Tab by mouth every six (6) hours as needed for Pain. Max Daily Amount: 4 Tabs. triamterene-hydroCHLOROthiazide 37.5-25 mg per capsule Commonly known as:  Darene Demetrice Take 1 Cap by mouth daily. Prescriptions Printed Refills  
 oxyCODONE-acetaminophen (PERCOCET) 5-325 mg per tablet 0 Sig: Take 1 Tab by mouth every six (6) hours as needed for Pain. Max Daily Amount: 4 Tabs. Class: Print Route: Oral  
  
We Performed the Following COMPLETE CBC & AUTO DIFF WBC [61239 CPT(R)] Follow-up Instructions Return in about 3 months (around 2/7/2018). To-Do List   
 11/07/2017 Lab:  CBC WITH 3 PART DIFF   
  
 11/08/2017 Lab:  CA 27.29   
  
 11/08/2017 Lab:  FERRITIN   
  
 11/08/2017 Lab:  IRON PROFILE   
  
 11/08/2017 Lab:  METABOLIC PANEL, COMPREHENSIVE Introducing [address]! [employer] introduces [employer] patient portal. Now you can access parts of your medical record, email your doctor's office, and request medication refills online. 1. In your internet browser, go to [email or web address] 2. Click on the First Time User? Click Here link in the Sign In box. You will see the New Member Sign Up page. 3. Enter your LabArchives Access Code exactly as it appears below. You will not need to use this code after youve completed the sign-up process. If you do not sign up before the expiration date, you must request a new code. · LabArchives Access Code: [email or web address] Expires: 12/28/2017  9:27 AM 
 
4. Enter the last four digits of your Social Security Number (xxxx) and Date of Birth (mm/dd/yyyy) as indicated and click Submit. You will be taken to the next sign-up page. 5. Create a LabArchives ID. This will be your LabArchives login ID and cannot be changed, so think of one that is secure and easy to remember. 6. Create a LabArchives password. You can change your password at any time. 7. Enter your Password Reset Question and Answer. This can be used at a later time if you forget your password. 8. Enter your e-mail address. You will receive e-mail notification when new information is available in [address] 9. Click Sign Up. You can now view and download portions of your medical record. 10. Click the Download Summary menu link to download a portable copy of your medical information. If you have questions, please visit the Frequently Asked Questions section of the [employer]Archives website. Remember, [employer]Archives is NOT to be used for urgent needs. For medical emergencies, dial 911. Now available from your iPhone and Android! Please provide this summary of care documentation to your next provider. Your primary care clinician is listed as Sheryl Alaniz. If you have any questions after today's visit, please call [phone].

## 2017-11-07 NOTE — PROGRESS NOTES
Hematology/Oncology  Progress Note    Name: Collin Antonio  Date: 2017  : 1960    PCP: Jas Rendon MD     Ms. Karolina Lopez is a 62 y.o.  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  Current therapy: The patient completed adjuvant systemic chemotherapy      Subjective:     Ms. Karolina Lopez is a 59-year-old  woman who has a history of DCIS involving the left breast and invasive ductal adenocarcinoma of the right breast. The patient has done well since she completed her systemic chemotherapy. She also underwent bilateral mastectomies. She decided not to proceed with reconstruction. She is requesting for a new prescription of her percocet. She has no physical complaints at this time. Past medical history, family history, and social history: these were reviewed and remains unchanged. Past Medical History:   Diagnosis Date    Cancer [facility])     right breast cancer     DDD (degenerative disc disease), cervical 2017    Hypercholesterolemia     Hypertension      Past Surgical History:   Procedure Laterality Date    HX BREAST RECONSTRUCTION  2014    BILATERAL BREAST RECONSTRUCTION WITH TISSUE EXPANDERS performed by Carlos Rene MD at [facility] MAIN OR    HX  SECTION      HX CHOLECYSTECTOMY      HX GYN      fibroid tumors removed    HX MASTECTOMY  2014    BILATERAL BREAST MASTECTOMY SIMPLE performed by Yan Garcia MD at [facility] MAIN OR    HX TUBAL LIGATION      HX VASCULAR ACCESS      left chest     Social History     Social History    Marital status:      Spouse name: N/A    Number of children: N/A    Years of education: N/A     Occupational History    Not on file.      Social History Main Topics    Smoking status: Former Smoker     Types: Cigarettes    Smokeless tobacco: Never Used      Comment: quit 2014    Alcohol use No      Comment: Socially    Drug use: No    Sexual activity: Not Currently     Other Topics Concern    Not on file     Social History Narrative     Family History   Problem Relation Age of Onset    Hypertension Mother     Diabetes Father     Diabetes Daughter     Diabetes Son      Current Outpatient Prescriptions   Medication Sig Dispense Refill    oxyCODONE-acetaminophen (PERCOCET) 5-325 mg per tablet Take 1 Tab by mouth every six (6) hours as needed for Pain. Max Daily Amount: 4 Tabs. 60 Tab 0    triamterene-hydroCHLOROthiazide (DYAZIDE) 37.5-25 mg per capsule Take 1 Cap by mouth daily. 90 Cap 1    meloxicam (MOBIC) 15 mg tablet Take 1 Tab by mouth daily. 30 Tab 3    lovastatin (MEVACOR) 20 mg tablet Take 1 Tab by mouth daily. 90 Tab 1    gabapentin (NEURONTIN) 100 mg capsule Take 2 Caps by mouth three (3) times daily. 180 Cap 11    ferrous sulfate (IRON) 325 mg (65 mg iron) tablet Take 325 mg by mouth Daily (before breakfast). Indications: IRON DEFICIENCY ANEMIA         Review of Systems  Constitutional: The patient has no acute distress or discomfort. HEENT: The patient denies recent head trauma, eye pain, blurred vision,  hearing deficit, oropharyngeal mucosal pain or lesions, and the patient denies throat pain or discomfort. Lymphatics: The patient denies palpable peripheral lymphadenopathy. Hematologic: The patient denies having bruising, bleeding, or progressive fatigue. Respiratory: Patient denies having shortness of breath, cough, sputum production, fever, or dyspnea on exertion. Cardiovascular: The patient denies having leg pain, leg swelling, heart palpitations, chest permit, chest pain, or lightheadedness. The patient denies having dyspnea on exertion. Gastrointestinal: The patient denies having nausea, emesis, or diarrhea. The patient denies having any hematemesis or blood in the stool. Genitourinary: Patient denies having urinary urgency, frequency, or dysuria. The patient denies having blood in the urine.   Psychological: The patient denies having symptoms of nervousness, anxiety, depression, or thoughts of harming self. Skin: Patient denies having skin rashes, skin, ulcerations, or unexplained itching or pruritus. Musculoskeletal: The patient denies having pain in the joints or bones. Objective:     Visit Vitals    /88 (BP 1 Location: Left arm, BP Patient Position: Sitting)    Pulse 77    Temp 98.6 °F (37 °C) (Oral)    Wt 116 kg (255 lb 12.8 oz)    BMI 43.91 kg/m2     ECOG PS=0, pain score=0/10   Physical Exam:   Gen. Appearance: The patient is in no acute distress. Skin: There is no bruise or rash. HEENT: The exam is unremarkable. Neck: Supple without lymphadenopathy or thyromegaly. Lungs: Clear to auscultation and percussion; there are no wheezes or rhonchi. Heart: Regular rate and rhythm; there are no murmurs, gallops, or rubs. Chest Wall and Axilla: No palpable masses, no evidence of disease recurrence. Abdomen: Bowel sounds are present and normal.  There is no guarding, tenderness, or hepatosplenomegaly. Extremities: There is no clubbing, cyanosis, or edema. Neurologic: There are no focal neurologic deficits. Lymphatics: There is no palpable peripheral lymphadenopathy. Musculoskeletal: The patient has full range of motion at all joints. There is no evidence of joint deformity or effusions. There is no focal joint tenderness. Psychological/psychiatric: There is no clinical evidence of anxiety, depression, or melancholy.     Lab data:      Results for orders placed or performed during the hospital encounter of 11/07/17   CBC WITH 3 PART DIFF     Status: Abnormal   Result Value Ref Range Status    WBC 5.3 4.5 - 13.0 K/uL Final    RBC 5.44 (H) 4.10 - 5.10 M/uL Final    HGB 12.8 12.0 - 16.0 g/dL Final    HCT 40.9 36 - 48 % Final    MCV 75.2 (L) 78 - 102 FL Final    MCH 23.5 (L) 25.0 - 35.0 PG Final    MCHC 31.3 31 - 37 g/dL Final    RDW 13.8 11.5 - 14.5 % Final    PLATELET 965 893 - 956 K/uL Final NEUTROPHILS 49 40 - 70 % Final    MIXED CELLS 7 0.1 - 17 % Final    LYMPHOCYTES 44 14 - 44 % Final    ABS. NEUTROPHILS 2.6 1.8 - 9.5 K/UL Final    ABS. MIXED CELLS 0.4 0.0 - 2.3 K/uL Final    ABS. LYMPHOCYTES 2.3 1.1 - 5.9 K/UL Final     Comment: Test performed at [place] [address] Location. Results Reviewed by Medical Director. DF AUTOMATED   Final           Assessment:     1. Ductal carcinoma in situ (DCIS) of breast, unspecified laterality    2. Anemia, unspecified type    3. Invasive ductal carcinoma of right breast, stage 1 (HCC)      Plan:     DCIS left breast/invasive ductal carcinoma right breast the patient is doing well and clinically there is no evidence of disease recurrence. I have instructed the patient to continue doing her chest wall and axilla exam on a monthly basis. Chemotherapy-induced peripheral neuropathy: I have instructed the patient to continue taking the Neurontin 200 mg 3 times daily. She also takes Percocet 5 mg every 4-6 hours p.r.n. I will renew her Rx today. Additionally, she takes Motrin 800 mg 3 times daily with food. She will continue to take these medications as previously noted. Arthritis: I have instructed the patient to continue using her Motrin and exercises as needed for arthritis. She also takes the Percocet 5 mg every 4-6 hours as well and this offers some benefit. I plan to see the patient in 3 months or sooner if indicated.     Orders Placed This Encounter    COMPLETE CBC & AUTO DIFF WBC    InHouse CBC ([employer] [employer])     Standing Status:   Future     Number of Occurrences:   1     Standing Expiration Date:   26/77/2834    METABOLIC PANEL, COMPREHENSIVE     Standing Status:   Future     Number of Occurrences:   1     Standing Expiration Date:   11/8/2018    IRON PROFILE     Standing Status:   Future     Number of Occurrences:   1     Standing Expiration Date:   11/8/2018    CA 27.29     Standing Status:   Future     Number of Occurrences:   1 Standing Expiration Date:   11/8/2018    FERRITIN     Standing Status:   Future     Number of Occurrences:   1     Standing Expiration Date:   11/8/2018    oxyCODONE-acetaminophen (PERCOCET) 5-325 mg per tablet     Sig: Take 1 Tab by mouth every six (6) hours as needed for Pain. Max Daily Amount: 4 Tabs.      Dispense:  60 Tab     Refill:  0       Guillermina Ennis MD  11/7/2017

## 2017-11-08 LAB
ALBUMIN SERPL-MCNC: 3.9 G/DL (ref 3.5–5.5)
ALBUMIN/GLOB SERPL: 1 {RATIO} (ref 1.2–2.2)
ALP SERPL-CCNC: 86 IU/L (ref 39–117)
ALT SERPL-CCNC: 16 IU/L (ref 0–32)
AST SERPL-CCNC: 16 IU/L (ref 0–40)
BILIRUB SERPL-MCNC: 0.4 MG/DL (ref 0–1.2)
BUN SERPL-MCNC: 13 MG/DL (ref 6–24)
BUN/CREAT SERPL: 17 (ref 9–23)
CALCIUM SERPL-MCNC: 9.7 MG/DL (ref 8.7–10.2)
CANCER AG27-29 SERPL-ACNC: 27.6 U/ML (ref 0–38.6)
CHLORIDE SERPL-SCNC: 100 MMOL/L (ref 96–106)
CO2 SERPL-SCNC: 23 MMOL/L (ref 18–29)
CREAT SERPL-MCNC: 0.76 MG/DL (ref 0.57–1)
FERRITIN SERPL-MCNC: 591 NG/ML (ref 15–150)
GFR SERPLBLD CREATININE-BSD FMLA CKD-EPI: 101 ML/MIN/1.73
GFR SERPLBLD CREATININE-BSD FMLA CKD-EPI: 87 ML/MIN/1.73
GLOBULIN SER CALC-MCNC: 4 G/DL (ref 1.5–4.5)
GLUCOSE SERPL-MCNC: 89 MG/DL (ref 65–99)
IRON SATN MFR SERPL: 39 % (ref 15–55)
IRON SERPL-MCNC: 96 UG/DL (ref 27–159)
POTASSIUM SERPL-SCNC: 4 MMOL/L (ref 3.5–5.2)
PROT SERPL-MCNC: 7.9 G/DL (ref 6–8.5)
SODIUM SERPL-SCNC: 141 MMOL/L (ref 134–144)
SPECIMEN STATUS REPORT, ROLRST: NORMAL
TIBC SERPL-MCNC: 244 UG/DL (ref 250–450)
UIBC SERPL-MCNC: 148 UG/DL (ref 131–425)

## 2017-12-06 ENCOUNTER — HOSPITAL ENCOUNTER (EMERGENCY)
Age: 57
Discharge: STILL ADMITTED OR ADMINISTRATIVE | End: 2017-12-06
Attending: EMERGENCY MEDICINE
Payer: COMMERCIAL

## 2017-12-06 PROCEDURE — 75810000275 HC EMERGENCY DEPT VISIT NO LEVEL OF CARE

## 2017-12-07 DIAGNOSIS — E78.5 HYPERLIPIDEMIA, UNSPECIFIED HYPERLIPIDEMIA TYPE: ICD-10-CM

## 2017-12-07 NOTE — TELEPHONE ENCOUNTER
Requested Prescriptions     Pending Prescriptions Disp Refills    lovastatin (MEVACOR) 20 mg tablet 90 Tab 1     Sig: Take 1 Tab by mouth daily.

## 2017-12-08 RX ORDER — LOVASTATIN 20 MG/1
20 TABLET ORAL DAILY
Qty: 90 TAB | Refills: 1 | Status: SHIPPED | OUTPATIENT
Start: 2017-12-08

## 2018-01-29 DIAGNOSIS — M19.90 ARTHRITIS: ICD-10-CM

## 2018-01-29 NOTE — TELEPHONE ENCOUNTER
Requested Prescriptions     Pending Prescriptions Disp Refills    meloxicam (MOBIC) 15 mg tablet 30 Tab 3     Sig: Take 1 Tab by mouth daily.      Patient confirms pharmacy: [facility] [address]

## 2018-02-09 ENCOUNTER — HOSPITAL ENCOUNTER (OUTPATIENT)
Dept: ONCOLOGY | Age: 58
Discharge: HOME | End: 2018-02-09

## 2018-02-09 ENCOUNTER — OFFICE VISIT (OUTPATIENT)
Dept: ONCOLOGY | Age: 58
End: 2018-02-09

## 2018-02-09 VITALS
DIASTOLIC BLOOD PRESSURE: 77 MMHG | HEART RATE: 74 BPM | WEIGHT: 254 LBS | SYSTOLIC BLOOD PRESSURE: 128 MMHG | BODY MASS INDEX: 43.6 KG/M2 | TEMPERATURE: 98 F

## 2018-02-09 DIAGNOSIS — G62.0 CHEMOTHERAPY-INDUCED PERIPHERAL NEUROPATHY (HCC): ICD-10-CM

## 2018-02-09 DIAGNOSIS — T45.1X5A CHEMOTHERAPY-INDUCED PERIPHERAL NEUROPATHY (HCC): ICD-10-CM

## 2018-02-09 DIAGNOSIS — G62.9 NEUROPATHY: ICD-10-CM

## 2018-02-09 DIAGNOSIS — C50.911 INVASIVE DUCTAL CARCINOMA OF RIGHT BREAST, STAGE 1 (HCC): ICD-10-CM

## 2018-02-09 DIAGNOSIS — M19.90 ARTHRITIS: ICD-10-CM

## 2018-02-09 DIAGNOSIS — D05.12 DUCTAL CARCINOMA IN SITU (DCIS) OF LEFT BREAST: Primary | ICD-10-CM

## 2018-02-09 DIAGNOSIS — D05.12 DUCTAL CARCINOMA IN SITU (DCIS) OF LEFT BREAST: ICD-10-CM

## 2018-02-09 LAB
BASO+EOS+MONOS # BLD AUTO: 0.5 K/UL (ref 0–2.3)
BASO+EOS+MONOS # BLD AUTO: 7 % (ref 0.1–17)
DIFFERENTIAL METHOD BLD: ABNORMAL
ERYTHROCYTE [DISTWIDTH] IN BLOOD BY AUTOMATED COUNT: 13.6 % (ref 11.5–14.5)
HCT VFR BLD AUTO: 40.8 % (ref 36–48)
HGB BLD-MCNC: 12.8 G/DL (ref 12–16)
LYMPHOCYTES # BLD: 2.3 K/UL (ref 1.1–5.9)
LYMPHOCYTES NFR BLD: 34 % (ref 14–44)
MCH RBC QN AUTO: 23.6 PG (ref 25–35)
MCHC RBC AUTO-ENTMCNC: 31.4 G/DL (ref 31–37)
MCV RBC AUTO: 75.3 FL (ref 78–102)
NEUTS SEG # BLD: 3.9 K/UL (ref 1.8–9.5)
NEUTS SEG NFR BLD: 59 % (ref 40–70)
PLATELET # BLD AUTO: 259 K/UL (ref 140–440)
RBC # BLD AUTO: 5.42 M/UL (ref 4.1–5.1)
WBC # BLD AUTO: 6.7 K/UL (ref 4.5–13)

## 2018-02-09 RX ORDER — OXYCODONE AND ACETAMINOPHEN 5; 325 MG/1; MG/1
1 TABLET ORAL
Qty: 60 TAB | Refills: 0 | Status: SHIPPED | OUTPATIENT
Start: 2018-02-09 | End: 2018-05-08 | Stop reason: SDUPTHER

## 2018-02-09 NOTE — MR AVS SNAPSHOT
[address] [place] [address] [address] [place] [address] 
[phone] Patient: Nir Regan MRN: [id number] OYI:[birth year] Visit Information Date & Time Provider Department Dept. Phone Encounter #  
 2/9/2018 12:00 PM [facility] Office [phone] [id number] Follow-up Instructions Return in about 3 months (around 5/9/2018). Your Appointments 3/29/2018  9:30 AM  
FOLLOW UP EXAM with Jeannette Jay MD  
[facility]) Appt Note: 6 month f/u  
 [address] [facility] [address]  
[facility] [address] Upcoming Health Maintenance Date Due Pneumococcal 19-64 Highest Risk (1 of 3 - PCV13) 1/11/1979 DTaP/Tdap/Td series (1 - Tdap) 1/11/1981 FOBT Q 1 YEAR AGE 50-75 3/8/2017 PAP AKA CERVICAL CYTOLOGY 12/10/2018 BREAST CANCER SCRN MAMMOGRAM 3/10/2019 Allergies as of 2/9/2018  Review Complete On: 2/9/2018 By: Nesha Elizabeth MD  
 No Known Allergies Current Immunizations  Reviewed on 9/15/2015 Name Date Influenza Vaccine 9/15/2015, 11/18/2014 Influenza Vaccine (Quad) PF 9/29/2017 Not reviewed this visit You Were Diagnosed With   
  
 Codes Comments Ductal carcinoma in situ (DCIS) of left breast    -  Primary ICD-10-CM: R26.85 
ICD-9-CM: 233.0 Invasive ductal carcinoma of right breast, stage 1 ([facility]     ICD-10-CM: C50.911 ICD-9-CM: 174.9 Arthritis     ICD-10-CM: M19.90 ICD-9-CM: 716.90 Neuropathy     ICD-10-CM: G62.9 ICD-9-CM: 006. 9 Chemotherapy-induced peripheral neuropathy (HCC)     ICD-10-CM: G62.0, T45.1X5A 
ICD-9-CM: 357.7, E933.1 Vitals BP Pulse Temp Weight(growth percentile) BMI OB Status 128/77 74 98 °F (36.7 °C) (Oral) 254 lb (115.2 kg) 43.6 kg/m2 Postmenopausal  
 Smoking Status Former Smoker BMI and BSA Data Body Mass Index Body Surface Area  
 43.6 kg/m 2 2.28 m 2 Preferred Pharmacy Pharmacy Name Phone [facility] [address] [phone] Your Updated Medication List  
  
   
This list is accurate as of: 2/9/18  1:08 PM.  Always use your most recent med list.  
  
  
  
  
 gabapentin 100 mg capsule Commonly known as:  NEURONTIN Take 2 Caps by mouth three (3) times daily. Iron 325 mg (65 mg iron) tablet Generic drug:  ferrous sulfate Take 325 mg by mouth Daily (before breakfast). Indications: IRON DEFICIENCY ANEMIA  
  
 lovastatin 20 mg tablet Commonly known as:  MEVACOR Take 1 Tab by mouth daily. meloxicam 15 mg tablet Commonly known as:  MOBIC Take 1 Tab by mouth daily. oxyCODONE-acetaminophen 5-325 mg per tablet Commonly known as:  PERCOCET Take 1 Tab by mouth every six (6) hours as needed for Pain. Max Daily Amount: 4 Tabs. triamterene-hydroCHLOROthiazide 37.5-25 mg per capsule Commonly known as:  Pelon Ndiaye Take 1 Cap by mouth daily. Prescriptions Printed Refills  
 oxyCODONE-acetaminophen (PERCOCET) 5-325 mg per tablet 0 Sig: Take 1 Tab by mouth every six (6) hours as needed for Pain. Max Daily Amount: 4 Tabs. Class: Print Route: Oral  
  
We Performed the Following COMPLETE CBC & AUTO DIFF WBC [44081 CPT(R)] Follow-up Instructions Return in about 3 months (around 5/9/2018). To-Do List   
 02/09/2018 Lab:  CA 27.29   
  
 02/09/2018 Lab:  CBC WITH 3 PART DIFF   
  
 02/09/2018 Lab:  METABOLIC PANEL, COMPREHENSIVE Patient Instructions Breast Cancer: Care Instructions Your Care Instructions Breast cancer occurs when abnormal cells grow out of control in the breast. These cancer cells can spread within the breast, to nearby lymph nodes and other tissues, and to other parts of the body. Being treated for cancer can weaken your body, and you may feel very tired. Get the rest your body needs so you can feel better. Finding out that you have cancer is scary. You may feel many emotions and may need some help coping. Seek out family, friends, and counselors for support. You also can do things at home to make yourself feel better while you go through treatment. Call the [employer] ([phone]) or visit its website at [employer] for more information. Follow-up care is a key part of your treatment and safety. Be sure to make and go to all appointments, and call your doctor if you are having problems. It's also a good idea to know your test results and keep a list of the medicines you take. How can you care for yourself at home? · Take your medicines exactly as prescribed. Call your doctor if you think you are having a problem with your medicine. You may get medicine for nausea and vomiting if you have these side effects. · Follow your doctor's instructions to relieve pain. Pain from cancer and surgery can almost always be controlled. Use pain medicine when you first notice pain, before it becomes severe. · Eat healthy food. If you do not feel like eating, try to eat food that has protein and extra calories to keep up your strength and prevent weight loss. Drink liquid meal replacements for extra calories and protein. Try to eat your main meal early. · Get some physical activity every day, but do not get too tired. Keep doing the hobbies you enjoy as your energy allows. · Do not smoke. Smoking can make your cancer worse. If you need help quitting, talk to your doctor about stop-smoking programs and medicines. These can increase your chances of quitting for good. · Take steps to control your stress and workload. Learn relaxation techniques. ¨ Share your feelings. Stress and tension affect our emotions.  By expressing your feelings to others, you may be able to understand and cope with them. ¨ Consider joining a support group. Talking about a problem with your spouse, a good friend, or other people with similar problems is a good way to reduce tension and stress. ¨ Express yourself through art. Try writing, crafts, dance, or art to relieve stress. Some dance, writing, or art groups may be available just for people who have cancer. ¨ Be kind to your body and mind. Getting enough sleep, eating a healthy diet, and taking time to do things you enjoy can contribute to an overall feeling of balance in your life and can help reduce stress. ¨ Get help if you need it. Discuss your concerns with your doctor or counselor. · If you are vomiting or have diarrhea: ¨ Drink plenty of fluids (enough so that your urine is light yellow or clear like water) to prevent dehydration. Choose water and other caffeine-free clear liquids. If you have kidney, heart, or liver disease and have to limit fluids, talk with your doctor before you increase the amount of fluids you drink. ¨ When you are able to eat, try clear soups, mild foods, and liquids until all symptoms are gone for 12 to 48 hours. Other good choices include dry toast, crackers, cooked cereal, and gelatin dessert, such as Jell-O. · If you have not already done so, prepare a list of advance directives. Advance directives are instructions to your doctor and family members about what kind of care you want if you become unable to speak or express yourself. When should you call for help? Call 911 anytime you think you may need emergency care. For example, call if: 
? · You passed out (lost consciousness). ?Call your doctor now or seek immediate medical care if: 
? · You have a fever. ? · You have abnormal bleeding. ? · You think you have an infection. ? · You have new or worse pain.   
? · You have new symptoms, such as a cough, belly pain, vomiting, diarrhea, or a rash. ? Watch closely for changes in your health, and be sure to contact your doctor if: 
? · You are much more tired than usual.  
? · You have swollen glands in your armpits, groin, or neck. ? · You do not get better as expected. Where can you learn more? Go to http://hanna-dharmesh.info/. Enter V321 in the search box to learn more about \"Breast Cancer: Care Instructions. \" Current as of: May 12, 2017 Content Version: 11.4 © 5307-0507 [employer]. Care instructions adapted under license by [employer] (which disclaims liability or warranty for this information). If you have questions about a medical condition or this instruction, always ask your healthcare professional. [address] any warranty or liability for your use of this information. Introducing [facility]! Shayna Lopez introduces [employer] patient portal. Now you can access parts of your medical record, email your doctor's office, and request medication refills online. 1. In your internet browser, go to [email or web address] 2. Click on the First Time User? Click Here link in the Sign In box. You will see the New Member Sign Up page. 3. Enter your [employer] Access Code exactly as it appears below. You will not need to use this code after youve completed the sign-up process. If you do not sign up before the expiration date, you must request a new code. · [employer] Access Code: [email or web address] Expires: 5/10/2018  1:07 PM 
 
4. Enter the last four digits of your Social Security Number (xxxx) and Date of Birth (mm/dd/yyyy) as indicated and click Submit. You will be taken to the next sign-up page. 5. Create a Qualyst ID. This will be your [employer] login ID and cannot be changed, so think of one that is secure and easy to remember. 6. Create a [employer]yst password. You can change your password at any time. 7. Enter your Password Reset Question and Answer. This can be used at a later time if you forget your password. 8. Enter your e-mail address. You will receive e-mail notification when new information is available in [address] 9. Click Sign Up. You can now view and download portions of your medical record. 10. Click the Download Summary menu link to download a portable copy of your medical information. If you have questions, please visit the Frequently Asked Questions section of the [employer] website. Remember, [employer] is NOT to be used for urgent needs. For medical emergencies, dial 911. Now available from your iPhone and Android! Please provide this summary of care documentation to your next provider. Your primary care clinician is listed as Mercedes Darden. If you have any questions after today's visit, please call [phone].

## 2018-02-09 NOTE — PATIENT INSTRUCTIONS
Breast Cancer: Care Instructions  Your Care Instructions    Breast cancer occurs when abnormal cells grow out of control in the breast. These cancer cells can spread within the breast, to nearby lymph nodes and other tissues, and to other parts of the body. Being treated for cancer can weaken your body, and you may feel very tired. Get the rest your body needs so you can feel better. Finding out that you have cancer is scary. You may feel many emotions and may need some help coping. Seek out family, friends, and counselors for support. You also can do things at home to make yourself feel better while you go through treatment. Call the [employer] (0-703.394.4058) or visit its website at [employer] for more information. Follow-up care is a key part of your treatment and safety. Be sure to make and go to all appointments, and call your doctor if you are having problems. It's also a good idea to know your test results and keep a list of the medicines you take. How can you care for yourself at home? · Take your medicines exactly as prescribed. Call your doctor if you think you are having a problem with your medicine. You may get medicine for nausea and vomiting if you have these side effects. · Follow your doctor's instructions to relieve pain. Pain from cancer and surgery can almost always be controlled. Use pain medicine when you first notice pain, before it becomes severe. · Eat healthy food. If you do not feel like eating, try to eat food that has protein and extra calories to keep up your strength and prevent weight loss. Drink liquid meal replacements for extra calories and protein. Try to eat your main meal early. · Get some physical activity every day, but do not get too tired. Keep doing the hobbies you enjoy as your energy allows. · Do not smoke. Smoking can make your cancer worse. If you need help quitting, talk to your doctor about stop-smoking programs and medicines.  These can increase your chances of quitting for good. · Take steps to control your stress and workload. Learn relaxation techniques. ¨ Share your feelings. Stress and tension affect our emotions. By expressing your feelings to others, you may be able to understand and cope with them. ¨ Consider joining a support group. Talking about a problem with your spouse, a good friend, or other people with similar problems is a good way to reduce tension and stress. ¨ Express yourself through art. Try writing, crafts, dance, or art to relieve stress. Some dance, writing, or art groups may be available just for people who have cancer. ¨ Be kind to your body and mind. Getting enough sleep, eating a healthy diet, and taking time to do things you enjoy can contribute to an overall feeling of balance in your life and can help reduce stress. ¨ Get help if you need it. Discuss your concerns with your doctor or counselor. · If you are vomiting or have diarrhea:  ¨ Drink plenty of fluids (enough so that your urine is light yellow or clear like water) to prevent dehydration. Choose water and other caffeine-free clear liquids. If you have kidney, heart, or liver disease and have to limit fluids, talk with your doctor before you increase the amount of fluids you drink. ¨ When you are able to eat, try clear soups, mild foods, and liquids until all symptoms are gone for 12 to 48 hours. Other good choices include dry toast, crackers, cooked cereal, and gelatin dessert, such as Jell-O.  · If you have not already done so, prepare a list of advance directives. Advance directives are instructions to your doctor and family members about what kind of care you want if you become unable to speak or express yourself. When should you call for help? Call 911 anytime you think you may need emergency care. For example, call if:  ? · You passed out (lost consciousness). ?Call your doctor now or seek immediate medical care if:  ? · You have a fever. ? · You have abnormal bleeding. ? · You think you have an infection. ? · You have new or worse pain. ? · You have new symptoms, such as a cough, belly pain, vomiting, diarrhea, or a rash. ? Watch closely for changes in your health, and be sure to contact your doctor if:  ? · You are much more tired than usual.   ? · You have swollen glands in your armpits, groin, or neck. ? · You do not get better as expected. Where can you learn more? Go to http://hanna-dharmesh.info/. Enter V321 in the search box to learn more about \"Breast Cancer: Care Instructions. \"  Current as of: May 12, 2017  Content Version: 11.4  © 8337-9788 [employer]bound[employer]. Care instructions adapted under license by [employer] (which disclaims liability or warranty for this information). If you have questions about a medical condition or this instruction, always ask your healthcare professional. [address] any warranty or liability for your use of this information.

## 2018-02-09 NOTE — PROGRESS NOTES
Hematology/Oncology  Progress Note    Name: Faustino Si  Date: 2018  : 1960    PCP: Sung Blair MD     Ms. Diana Pollard is a 62 y.o.  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  Current therapy: The patient completed adjuvant systemic chemotherapy      Subjective:     Ms. Diana Pollard is a 66-year-old [facility] [place] woman who has a history of DCIS involving the left breast and invasive ductal adenocarcinoma of the right breast. The patient has done well since she completed her systemic chemotherapy. She also underwent bilateral mastectomies. She decided not to proceed with reconstruction. She is requesting for a new prescription of her percocet. She has no physical complaints at this time. Past medical history, family history, and social history: these were reviewed and remains unchanged. Past Medical History:   Diagnosis Date    Cancer [facility])     right breast cancer     DDD (degenerative disc disease), cervical 2017    Hypercholesterolemia     Hypertension      Past Surgical History:   Procedure Laterality Date    HX BREAST RECONSTRUCTION  2014    BILATERAL BREAST RECONSTRUCTION WITH TISSUE EXPANDERS performed by Екатерина Yusuf MD at [facility] MAIN OR    HX  SECTION      HX CHOLECYSTECTOMY      HX GYN      fibroid tumors removed    HX MASTECTOMY  2014    BILATERAL BREAST MASTECTOMY SIMPLE performed by Leslye Rinne, MD at [facility] MAIN OR    HX TUBAL LIGATION      HX VASCULAR ACCESS      left chest     Social History     Social History    Marital status:      Spouse name: N/A    Number of children: N/A    Years of education: N/A     Occupational History    Not on file.      Social History Main Topics    Smoking status: Former Smoker     Types: Cigarettes    Smokeless tobacco: Never Used      Comment: quit 2014    Alcohol use No      Comment: Socially    Drug use: No    Sexual activity: Not Currently     Other Topics Concern    Not on file     Social History Narrative     Family History   Problem Relation Age of Onset    Hypertension Mother     Diabetes Father     Diabetes Daughter     Diabetes Son      Current Outpatient Prescriptions   Medication Sig Dispense Refill    oxyCODONE-acetaminophen (PERCOCET) 5-325 mg per tablet Take 1 Tab by mouth every six (6) hours as needed for Pain. Max Daily Amount: 4 Tabs. 60 Tab 0    lovastatin (MEVACOR) 20 mg tablet Take 1 Tab by mouth daily. 90 Tab 1    triamterene-hydroCHLOROthiazide (DYAZIDE) 37.5-25 mg per capsule Take 1 Cap by mouth daily. 90 Cap 1    meloxicam (MOBIC) 15 mg tablet Take 1 Tab by mouth daily. 30 Tab 3    gabapentin (NEURONTIN) 100 mg capsule Take 2 Caps by mouth three (3) times daily. 180 Cap 11    ferrous sulfate (IRON) 325 mg (65 mg iron) tablet Take 325 mg by mouth Daily (before breakfast). Indications: IRON DEFICIENCY ANEMIA         Review of Systems  Constitutional: The patient has no acute distress or discomfort. HEENT: The patient denies recent head trauma, eye pain, blurred vision,  hearing deficit, oropharyngeal mucosal pain or lesions, and the patient denies throat pain or discomfort. Lymphatics: The patient denies palpable peripheral lymphadenopathy. Hematologic: The patient denies having bruising, bleeding, or progressive fatigue. Respiratory: Patient denies having shortness of breath, cough, sputum production, fever, or dyspnea on exertion. Cardiovascular: The patient denies having leg pain, leg swelling, heart palpitations, chest permit, chest pain, or lightheadedness. The patient denies having dyspnea on exertion. Gastrointestinal: The patient denies having nausea, emesis, or diarrhea. The patient denies having any hematemesis or blood in the stool. Genitourinary: Patient denies having urinary urgency, frequency, or dysuria. The patient denies having blood in the urine.   Psychological: The patient denies having symptoms of nervousness, anxiety, depression, or thoughts of harming self. Skin: Patient denies having skin rashes, skin, ulcerations, or unexplained itching or pruritus. Musculoskeletal: The patient denies having pain in the joints or bones. Objective:     Visit Vitals    /77    Pulse 74    Temp 98 °F (36.7 °C) (Oral)    Wt 115.2 kg (254 lb)    BMI 43.6 kg/m2     ECOG PS=0, pain score=0/10   Physical Exam:   Gen. Appearance: The patient is in no acute distress. Skin: There is no bruise or rash. HEENT: The exam is unremarkable. Neck: Supple without lymphadenopathy or thyromegaly. Lungs: Clear to auscultation and percussion; there are no wheezes or rhonchi. Heart: Regular rate and rhythm; there are no murmurs, gallops, or rubs. Chest Wall and Axilla: No palpable masses, no evidence of disease recurrence. Abdomen: Bowel sounds are present and normal.  There is no guarding, tenderness, or hepatosplenomegaly. Extremities: There is no clubbing, cyanosis, or edema. Neurologic: There are no focal neurologic deficits. Lymphatics: There is no palpable peripheral lymphadenopathy. Musculoskeletal: The patient has full range of motion at all joints. There is no evidence of joint deformity or effusions. There is no focal joint tenderness. Psychological/psychiatric: There is no clinical evidence of anxiety, depression, or melancholy.     Lab data:      Results for orders placed or performed during the hospital encounter of 02/09/18   CBC WITH 3 PART DIFF     Status: Abnormal   Result Value Ref Range Status    WBC 6.7 4.5 - 13.0 K/uL Final    RBC 5.42 (H) 4.10 - 5.10 M/uL Final    HGB 12.8 12.0 - 16.0 g/dL Final    HCT 40.8 36 - 48 % Final    MCV 75.3 (L) 78 - 102 FL Final    MCH 23.6 (L) 25.0 - 35.0 PG Final    MCHC 31.4 31 - 37 g/dL Final    RDW 13.6 11.5 - 14.5 % Final    PLATELET 687 252 - 432 K/uL Final    NEUTROPHILS 59 40 - 70 % Final    MIXED CELLS 7 0.1 - 17 % Final LYMPHOCYTES 34 14 - 44 % Final    ABS. NEUTROPHILS 3.9 1.8 - 9.5 K/UL Final    ABS. MIXED CELLS 0.5 0.0 - 2.3 K/uL Final    ABS. LYMPHOCYTES 2.3 1.1 - 5.9 K/UL Final     Comment: Test performed at [place] [address] Location. Results Reviewed by Medical Director. DF AUTOMATED   Final           Assessment:     1. Ductal carcinoma in situ (DCIS) of left breast    2. Invasive ductal carcinoma of right breast, stage 1 (HCC)    3. Arthritis    4. Neuropathy    5. Chemotherapy-induced peripheral neuropathy (HCC)      Plan:     DCIS left breast/invasive ductal carcinoma right breast the patient is doing well and clinically there is no evidence of disease recurrence. I have instructed the patient to continue doing her chest wall and axilla exam on a monthly basis. The most recent CA-27-29 level from 11/7/2017 was normal at 27.6 U/mL    Chemotherapy-induced peripheral neuropathy: I have instructed the patient to continue taking the Neurontin 200 mg 3 times daily. She also takes Percocet 5 mg every 4-6 hours p.r.n. I will renew her Rx today. Additionally, she takes Motrin 800 mg 3 times daily with food. She will continue to take these medications as previously noted. Arthritis: I have instructed the patient to continue using her Motrin and exercises as needed for arthritis. She also takes the Percocet 5 mg every 4-6 hours as well and this offers some benefit. I plan to see the patient in 3 months or sooner if indicated.     Orders Placed This Encounter    COMPLETE CBC & AUTO DIFF WBC    InHouse CBC ([employer])     Standing Status:   Future     Number of Occurrences:   1     Standing Expiration Date:   4/43/0991    METABOLIC PANEL, COMPREHENSIVE     Standing Status:   Future     Standing Expiration Date:   2/10/2019    CA 27.29     Standing Status:   Future     Standing Expiration Date:   2/10/2019    oxyCODONE-acetaminophen (PERCOCET) 5-325 mg per tablet     Sig: Take 1 Tab by mouth every six (6) hours as needed for Pain. Max Daily Amount: 4 Tabs.      Dispense:  60 Tab     Refill:  0       Cherelle Doll MD  2/9/2018

## 2018-02-10 LAB
A-G RATIO,AGRAT: 1.2 RATIO (ref 1.1–2.6)
ALBUMIN SERPL-MCNC: 4.2 G/DL (ref 3.5–5)
ALP SERPL-CCNC: 77 U/L (ref 25–115)
ALT SERPL-CCNC: 16 U/L (ref 5–40)
ANION GAP SERPL CALC-SCNC: 15 MMOL/L
AST SERPL W P-5'-P-CCNC: 19 U/L (ref 10–37)
BILIRUB SERPL-MCNC: 0.4 MG/DL (ref 0.2–1.2)
BUN SERPL-MCNC: 12 MG/DL (ref 6–22)
CALCIUM SERPL-MCNC: 9.3 MG/DL (ref 8.4–10.5)
CHLORIDE SERPL-SCNC: 96 MMOL/L (ref 98–110)
CO2 SERPL-SCNC: 26 MMOL/L (ref 20–32)
CREAT SERPL-MCNC: 0.7 MG/DL (ref 0.5–1.2)
GFRAA, 66117: >60
GFRNA, 66118: >60
GLOBULIN,GLOB: 3.5 G/DL (ref 2–4)
GLUCOSE SERPL-MCNC: 100 MG/DL (ref 70–99)
POTASSIUM SERPL-SCNC: 3.5 MMOL/L (ref 3.5–5.5)
PROT SERPL-MCNC: 7.7 G/DL (ref 6.4–8.3)
SODIUM SERPL-SCNC: 137 MMOL/L (ref 133–145)

## 2018-02-13 LAB — CA 27.29, 142134: 24.4 U/ML

## 2018-02-26 RX ORDER — MELOXICAM 15 MG/1
15 TABLET ORAL DAILY
Qty: 30 TAB | Refills: 0 | Status: SHIPPED | OUTPATIENT
Start: 2018-02-26

## 2018-04-11 RX ORDER — GABAPENTIN 100 MG/1
200 CAPSULE ORAL 3 TIMES DAILY
Qty: 180 CAP | Refills: 11 | Status: SHIPPED | OUTPATIENT
Start: 2018-04-11 | End: 2019-05-17 | Stop reason: SDUPTHER

## 2018-05-08 ENCOUNTER — HOSPITAL ENCOUNTER (OUTPATIENT)
Dept: ONCOLOGY | Age: 58
Discharge: HOME | End: 2018-05-08

## 2018-05-08 ENCOUNTER — OFFICE VISIT (OUTPATIENT)
Dept: ONCOLOGY | Age: 58
End: 2018-05-08

## 2018-05-08 VITALS
DIASTOLIC BLOOD PRESSURE: 80 MMHG | WEIGHT: 248 LBS | TEMPERATURE: 98.3 F | BODY MASS INDEX: 42.57 KG/M2 | SYSTOLIC BLOOD PRESSURE: 138 MMHG | HEART RATE: 62 BPM

## 2018-05-08 DIAGNOSIS — D05.10 DUCTAL CARCINOMA IN SITU (DCIS) OF BREAST, UNSPECIFIED LATERALITY: ICD-10-CM

## 2018-05-08 DIAGNOSIS — D05.12 DUCTAL CARCINOMA IN SITU (DCIS) OF LEFT BREAST: ICD-10-CM

## 2018-05-08 DIAGNOSIS — C50.911 INVASIVE DUCTAL CARCINOMA OF RIGHT BREAST, STAGE 1 (HCC): ICD-10-CM

## 2018-05-08 DIAGNOSIS — D64.9 CHRONIC ANEMIA: ICD-10-CM

## 2018-05-08 DIAGNOSIS — G62.0 CHEMOTHERAPY-INDUCED PERIPHERAL NEUROPATHY (HCC): ICD-10-CM

## 2018-05-08 DIAGNOSIS — T45.1X5A CHEMOTHERAPY-INDUCED PERIPHERAL NEUROPATHY (HCC): ICD-10-CM

## 2018-05-08 DIAGNOSIS — D05.10 DUCTAL CARCINOMA IN SITU (DCIS) OF BREAST, UNSPECIFIED LATERALITY: Primary | ICD-10-CM

## 2018-05-08 LAB
BASO+EOS+MONOS # BLD AUTO: 0.4 K/UL (ref 0–2.3)
BASO+EOS+MONOS # BLD AUTO: 7 % (ref 0.1–17)
DIFFERENTIAL METHOD BLD: ABNORMAL
ERYTHROCYTE [DISTWIDTH] IN BLOOD BY AUTOMATED COUNT: 13.3 % (ref 11.5–14.5)
HCT VFR BLD AUTO: 40.5 % (ref 36–48)
HGB BLD-MCNC: 12.6 G/DL (ref 12–16)
LYMPHOCYTES # BLD: 2.3 K/UL (ref 1.1–5.9)
LYMPHOCYTES NFR BLD: 39 % (ref 14–44)
MCH RBC QN AUTO: 23.2 PG (ref 25–35)
MCHC RBC AUTO-ENTMCNC: 31.1 G/DL (ref 31–37)
MCV RBC AUTO: 74.6 FL (ref 78–102)
NEUTS SEG # BLD: 3.2 K/UL (ref 1.8–9.5)
NEUTS SEG NFR BLD: 53 % (ref 40–70)
PLATELET # BLD AUTO: 263 K/UL (ref 140–440)
RBC # BLD AUTO: 5.43 M/UL (ref 4.1–5.1)
WBC # BLD AUTO: 5.9 K/UL (ref 4.5–13)

## 2018-05-08 RX ORDER — OXYCODONE AND ACETAMINOPHEN 5; 325 MG/1; MG/1
1 TABLET ORAL
Qty: 60 TAB | Refills: 0 | Status: SHIPPED | OUTPATIENT
Start: 2018-05-08 | End: 2018-08-07 | Stop reason: SDUPTHER

## 2018-05-08 NOTE — PATIENT INSTRUCTIONS
Breast Cancer: Care Instructions  Your Care Instructions    Breast cancer occurs when abnormal cells grow out of control in the breast. These cancer cells can spread within the breast, to nearby lymph nodes and other tissues, and to other parts of the body. Being treated for cancer can weaken your body, and you may feel very tired. Get the rest your body needs so you can feel better. Finding out that you have cancer is scary. You may feel many emotions and may need some help coping. Seek out family, friends, and counselors for support. You also can do things at home to make yourself feel better while you go through treatment. Call the [employer] (2-724.612.4061) or visit its website at [employer] HOLL[employer] for more information. Follow-up care is a key part of your treatment and safety. Be sure to make and go to all appointments, and call your doctor if you are having problems. It's also a good idea to know your test results and keep a list of the medicines you take. How can you care for yourself at home? · Take your medicines exactly as prescribed. Call your doctor if you think you are having a problem with your medicine. You may get medicine for nausea and vomiting if you have these side effects. · Follow your doctor's instructions to relieve pain. Pain from cancer and surgery can almost always be controlled. Use pain medicine when you first notice pain, before it becomes severe. · Eat healthy food. If you do not feel like eating, try to eat food that has protein and extra calories to keep up your strength and prevent weight loss. Drink liquid meal replacements for extra calories and protein. Try to eat your main meal early. · Get some physical activity every day, but do not get too tired. Keep doing the hobbies you enjoy as your energy allows. · Do not smoke. Smoking can make your cancer worse. If you need help quitting, talk to your doctor about stop-smoking programs and medicines.  These can increase your chances of quitting for good. · Take steps to control your stress and workload. Learn relaxation techniques. ¨ Share your feelings. Stress and tension affect our emotions. By expressing your feelings to others, you may be able to understand and cope with them. ¨ Consider joining a support group. Talking about a problem with your spouse, a good friend, or other people with similar problems is a good way to reduce tension and stress. ¨ Express yourself through art. Try writing, crafts, dance, or art to relieve stress. Some dance, writing, or art groups may be available just for people who have cancer. ¨ Be kind to your body and mind. Getting enough sleep, eating a healthy diet, and taking time to do things you enjoy can contribute to an overall feeling of balance in your life and can help reduce stress. ¨ Get help if you need it. Discuss your concerns with your doctor or counselor. · If you are vomiting or have diarrhea:  ¨ Drink plenty of fluids (enough so that your urine is light yellow or clear like water) to prevent dehydration. Choose water and other caffeine-free clear liquids. If you have kidney, heart, or liver disease and have to limit fluids, talk with your doctor before you increase the amount of fluids you drink. ¨ When you are able to eat, try clear soups, mild foods, and liquids until all symptoms are gone for 12 to 48 hours. Other good choices include dry toast, crackers, cooked cereal, and gelatin dessert, such as Jell-O.  · If you have not already done so, prepare a list of advance directives. Advance directives are instructions to your doctor and family members about what kind of care you want if you become unable to speak or express yourself. When should you call for help? Call 911 anytime you think you may need emergency care. For example, call if:  ? · You passed out (lost consciousness). ?Call your doctor now or seek immediate medical care if:  ? · You have a fever. ? · You have abnormal bleeding. ? · You think you have an infection. ? · You have new or worse pain. ? · You have new symptoms, such as a cough, belly pain, vomiting, diarrhea, or a rash. ? Watch closely for changes in your health, and be sure to contact your doctor if:  ? · You are much more tired than usual.   ? · You have swollen glands in your armpits, groin, or neck. ? · You do not get better as expected. Where can you learn more? Go to http://hanna-dharmesh.info/. Enter V321 in the search box to learn more about \"Breast Cancer: Care Instructions. \"  Current as of: May 12, 2017  Content Version: 11.4  © 5161-3912 [employer]. Care instructions adapted under license by [employer] (which disclaims liability or warranty for this information). If you have questions about a medical condition or this instruction, always ask your healthcare professional. [address] any warranty or liability for your use of this information.

## 2018-05-08 NOTE — PROGRESS NOTES
Hematology/Oncology  Progress Note    Name: Nicole Foote  Date: 2018  : 1960    PCP: Edna Bahena MD     Ms. Paz Ballard is a 62 y.o.  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  Current therapy: The patient completed adjuvant systemic chemotherapy      Subjective:     Ms. Paz Ballard is a 27-year-old [facility] American woman who has a history of DCIS involving the left breast and invasive ductal adenocarcinoma of the right breast. The patient has done well since she completed her systemic chemotherapy. She also underwent bilateral mastectomies. She decided not to proceed with reconstruction. She is requesting for a new prescription of her percocet. She has no physical complaints at this time. Past medical history, family history, and social history: these were reviewed and remains unchanged. Past Medical History:   Diagnosis Date    Cancer [facility])     right breast cancer     DDD (degenerative disc disease), cervical 2017    Hypercholesterolemia     Hypertension      Past Surgical History:   Procedure Laterality Date    HX BREAST RECONSTRUCTION  2014    BILATERAL BREAST RECONSTRUCTION WITH TISSUE EXPANDERS performed by Anna Balderrama MD at [facility] MA[facility] OR    HX  SECTION      HX CHOLECYSTECTOMY      HX GYN      fibroid tumors removed    HX MASTECTOMY  2014    BILATERAL BREAST MASTECTOMY SIMPLE performed by Kathya Foote MD at [facility] MA[facility] OR    HX TUBAL LIGATION      HX VASCULAR ACCESS      left chest     Social History     Social History    Marital status:      Spouse name: N/A    Number of children: N/A    Years of education: N/A     Occupational History    Not on file.      Social History Main Topics    Smoking status: Former Smoker     Types: Cigarettes    Smokeless tobacco: Never Used      Comment: quit 2014    Alcohol use No      Comment: Socially    Drug use: No    Sexual activity: Not Currently     Other Topics Concern    Not on file     Social History Narrative     Family History   Problem Relation Age of Onset    Hypertension Mother     Diabetes Father     Diabetes Daughter     Diabetes Son      Current Outpatient Prescriptions   Medication Sig Dispense Refill    gabapentin (NEURONTIN) 100 mg capsule Take 2 Caps by mouth three (3) times daily. 180 Cap 11    meloxicam (MOBIC) 15 mg tablet Take 1 Tab by mouth daily. 30 Tab 0    oxyCODONE-acetaminophen (PERCOCET) 5-325 mg per tablet Take 1 Tab by mouth every six (6) hours as needed for Pain. Max Daily Amount: 4 Tabs. 60 Tab 0    lovastatin (MEVACOR) 20 mg tablet Take 1 Tab by mouth daily. 90 Tab 1    triamterene-hydroCHLOROthiazide (DYAZIDE) 37.5-25 mg per capsule Take 1 Cap by mouth daily. 90 Cap 1    ferrous sulfate (IRON) 325 mg (65 mg iron) tablet Take 325 mg by mouth Daily (before breakfast). Indications: IRON DEFICIENCY ANEMIA         Review of Systems  Constitutional: The patient has no acute distress or discomfort. HEENT: The patient denies recent head trauma, eye pain, blurred vision,  hearing deficit, oropharyngeal mucosal pain or lesions, and the patient denies throat pain or discomfort. Lymphatics: The patient denies palpable peripheral lymphadenopathy. Hematologic: The patient denies having bruising, bleeding, or progressive fatigue. Respiratory: Patient denies having shortness of breath, cough, sputum production, fever, or dyspnea on exertion. Cardiovascular: The patient denies having leg pain, leg swelling, heart palpitations, chest permit, chest pain, or lightheadedness. The patient denies having dyspnea on exertion. Gastrointestinal: The patient denies having nausea, emesis, or diarrhea. The patient denies having any hematemesis or blood in the stool. Genitourinary: Patient denies having urinary urgency, frequency, or dysuria. The patient denies having blood in the urine.   Psychological: The patient denies having symptoms of nervousness, anxiety, depression, or thoughts of harming self. Skin: Patient denies having skin rashes, skin, ulcerations, or unexplained itching or pruritus. Musculoskeletal: The patient denies having pain in the joints or bones. Objective:     Visit Vitals    /80    Pulse 62    Temp 98.3 °F (36.8 °C) (Oral)    Wt 112.5 kg (248 lb)    BMI 42.57 kg/m2     ECOG PS=0, pain score=0/10   Physical Exam:   Gen. Appearance: The patient is in no acute distress. Skin: There is no bruise or rash. HEENT: The exam is unremarkable. Neck: Supple without lymphadenopathy or thyromegaly. Lungs: Clear to auscultation and percussion; there are no wheezes or rhonchi. Heart: Regular rate and rhythm; there are no murmurs, gallops, or rubs. Chest Wall and Axilla: No palpable masses, no evidence of disease recurrence. Abdomen: Bowel sounds are present and normal.  There is no guarding, tenderness, or hepatosplenomegaly. Extremities: There is no clubbing, cyanosis, or edema. Neurologic: There are no focal neurologic deficits. Lymphatics: There is no palpable peripheral lymphadenopathy. Musculoskeletal: The patient has full range of motion at all joints. There is no evidence of joint deformity or effusions. There is no focal joint tenderness. Psychological/psychiatric: There is no clinical evidence of anxiety, depression, or melancholy.     Lab data:      Results for orders placed or performed during the hospital encounter of 02/09/18   CBC WITH 3 PART DIFF     Status: Abnormal   Result Value Ref Range Status    WBC 6.7 4.5 - 13.0 K/uL Final    RBC 5.42 (H) 4.10 - 5.10 M/uL Final    HGB 12.8 12.0 - 16.0 g/dL Final    HCT 40.8 36 - 48 % Final    MCV 75.3 (L) 78 - 102 FL Final    MCH 23.6 (L) 25.0 - 35.0 PG Final    MCHC 31.4 31 - 37 g/dL Final    RDW 13.6 11.5 - 14.5 % Final    PLATELET 810 768 - 895 K/uL Final    NEUTROPHILS 59 40 - 70 % Final    MIXED CELLS 7 0.1 - 17 % Final LYMPHOCYTES 34 14 - 44 % Final    ABS. NEUTROPHILS 3.9 1.8 - 9.5 K/UL Final    ABS. MIXED CELLS 0.5 0.0 - 2.3 K/uL Final    ABS. LYMPHOCYTES 2.3 1.1 - 5.9 K/UL Final     Comment: Test performed at [place] [id number] Location. Results Reviewed by Medical Director. DF AUTOMATED   Final           Assessment:     1. Ductal carcinoma in situ (DCIS) of breast, unspecified laterality    2. Ductal carcinoma in situ (DCIS) of left breast    3. Invasive ductal carcinoma of right breast, stage 1 (HCC)    4. Chemotherapy-induced peripheral neuropathy ([facility]    5. Chronic anemia      Plan:     DCIS left breast/invasive ductal carcinoma right breast the patient is doing well and clinically there is no evidence of disease recurrence. I have instructed the patient to continue doing her chest wall and axilla exam on a monthly basis. The most recent CA-27-29 level from 2/9/2018 was normal at 24.4 u/mL    Chemotherapy-induced peripheral neuropathy: I have instructed the patient to continue taking the Neurontin 200 mg 3 times daily. She also takes Percocet 5 mg every 4-6 hours p.r.n. I will renew her Rx today. Additionally, she takes Motrin 800 mg 3 times daily with food. She will continue to take these medications as previously noted. Arthritis: I have instructed the patient to continue using her Motrin and exercises as needed for arthritis. She also takes the Percocet 5 mg every 4-6 hours as well and this offers some benefit. I plan to see the patient in 3 months or sooner if indicated.     Orders Placed This Encounter    COMPLETE CBC & AUTO DIFF WBC    InHouse CBC ([employer])     Standing Status:   Future     Number of Occurrences:   1     Standing Expiration Date:   3/70/8188    METABOLIC PANEL, COMPREHENSIVE     Standing Status:   Future     Standing Expiration Date:   5/9/2019    IRON PROFILE     Standing Status:   Future     Standing Expiration Date:   5/9/2019    FERRITIN     Standing Status:   Future Standing Expiration Date:   5/9/2019    CA 27.29     Standing Status:   Future     Standing Expiration Date:   5/9/2019       Kenton Friedman MD  5/8/2018

## 2018-05-08 NOTE — MR AVS SNAPSHOT
Iqra Allison 
 
 
 [facility] [address] Merged with [facility] [address] 
[phone] Patient: Brown Newton MRN: [id number] NMS:[id number] Visit Information Date & Time Provider Department Dept. Phone Encounter #  
 5/8/2018 10:30 AM Elsie Estrada MD [facility] Office [phone] [id number] Follow-up Instructions Return in about 3 months (around 8/8/2018). Your Appointments 8/7/2018 10:30 AM  
Office Visit with MD Juan Rubio [address] [place] [address] [facility]: OV  
 [facility] [address] [place] [id number] [phone]  
  
   
 [facility] [address] Upcoming Health Maintenance Date Due Pneumococcal 19-64 Highest Risk (1 of 3 - PCV13) 1/11/1979 DTaP/Tdap/Td series (1 - Tdap) 1/11/1981 FOBT Q 1 YEAR AGE 50-75 3/8/2017 Influenza Age 5 to Adult 8/1/2018 PAP AKA CERVICAL CYTOLOGY 12/10/2018 BREAST CANCER SCRN MAMMOGRAM 3/10/2019 Allergies as of 5/8/2018  Review Complete On: 5/8/2018 By: Elsie Estrada MD  
 No Known Allergies Current Immunizations  Reviewed on 9/15/2015 Name Date Influenza Vaccine 9/15/2015, 11/18/2014 Influenza Vaccine (Quad) PF 9/29/2017 Not reviewed this visit You Were Diagnosed With   
  
 Codes Comments Ductal carcinoma in situ (DCIS) of breast, unspecified laterality    -  Primary ICD-10-CM: D05.10 ICD-9-CM: 233.0 Ductal carcinoma in situ (DCIS) of left breast     ICD-10-CM: D05.12 
ICD-9-CM: 233.0 Invasive ductal carcinoma of right breast, stage 1 ([facility] [address]     ICD-10-CM: C50.911 ICD-9-CM: 174.9 Chemotherapy-induced peripheral neuropathy (HCC)     ICD-10-CM: G62.0, T45.1X5A 
ICD-9-CM: 357.7, E933.1 Chronic anemia     ICD-10-CM: D64.9 ICD-9-CM: 963. 9 Vitals BP Pulse Temp Weight(growth percentile) BMI OB Status 138/80 62 98.3 °F (36.8 °C) (Oral) 248 lb (112.5 kg) 42.57 kg/m2 Postmenopausal  
 Smoking Status Former Smoker BMI and BSA Data Body Mass Index Body Surface Area 42.57 kg/m 2 2.25 m 2 Preferred Pharmacy Pharmacy Name Phone [address] [facility] [phone] Your Updated Medication List  
  
   
This list is accurate as of 5/8/18 10:46 AM.  Always use your most recent med list.  
  
  
  
  
 gabapentin 100 mg capsule Commonly known as:  NEURONTIN Take 2 Caps by mouth three (3) times daily. Iron 325 mg (65 mg iron) tablet Generic drug:  ferrous sulfate Take 325 mg by mouth Daily (before breakfast). Indications: IRON DEFICIENCY ANEMIA  
  
 lovastatin 20 mg tablet Commonly known as:  MEVACOR Take 1 Tab by mouth daily. meloxicam 15 mg tablet Commonly known as:  MOBIC Take 1 Tab by mouth daily. oxyCODONE-acetaminophen 5-325 mg per tablet Commonly known as:  PERCOCET Take 1 Tab by mouth every six (6) hours as needed for Pain. Max Daily Amount: 4 Tabs. triamterene-hydroCHLOROthiazide 37.5-25 mg per capsule Commonly known as:  Collene Hoffman Take 1 Cap by mouth daily. Prescriptions Printed Refills  
 oxyCODONE-acetaminophen (PERCOCET) 5-325 mg per tablet 0 Sig: Take 1 Tab by mouth every six (6) hours as needed for Pain. Max Daily Amount: 4 Tabs. Class: Print Route: Oral  
  
We Performed the Following CA 27.29 [99665 CPT(R)] COMPLETE CBC & AUTO DIFF WBC [44960 CPT(R)] FERRITIN [17561 CPT(R)] IRON PROFILE [id number] CPT(R)] METABOLIC PANEL, COMPREHENSIVE [00116 CPT(R)] Follow-up Instructions Return in about 3 months (around 8/8/2018). To-Do List   
 05/08/2018 Lab:  CA 27.29   
  
 05/08/2018 Lab:  CBC WITH 3 PART DIFF   
  
 05/08/2018 Lab:  FERRITIN   
  
 05/08/2018 Lab:  IRON PROFILE   
  
 05/08/2018 Lab: METABOLIC PANEL, COMPREHENSIVE Patient Instructions Breast Cancer: Care Instructions Your Care Instructions Breast cancer occurs when abnormal cells grow out of control in the breast. These cancer cells can spread within the breast, to nearby lymph nodes and other tissues, and to other parts of the body. Being treated for cancer can weaken your body, and you may feel very tired. Get the rest your body needs so you can feel better. Finding out that you have cancer is scary. You may feel many emotions and may need some help coping. Seek out family, friends, and counselors for support. You also can do things at home to make yourself feel better while you go through treatment. Call the [employer] (0-858.978.5118) or visit its website at Garlik[employer] eSnips for more information. Follow-up care is a key part of your treatment and safety. Be sure to make and go to all appointments, and call your doctor if you are having problems. It's also a good idea to know your test results and keep a list of the medicines you take. How can you care for yourself at home? · Take your medicines exactly as prescribed. Call your doctor if you think you are having a problem with your medicine. You may get medicine for nausea and vomiting if you have these side effects. · Follow your doctor's instructions to relieve pain. Pain from cancer and surgery can almost always be controlled. Use pain medicine when you first notice pain, before it becomes severe. · Eat healthy food. If you do not feel like eating, try to eat food that has protein and extra calories to keep up your strength and prevent weight loss. Drink liquid meal replacements for extra calories and protein. Try to eat your main meal early. · Get some physical activity every day, but do not get too tired. Keep doing the hobbies you enjoy as your energy allows. · Do not smoke. Smoking can make your cancer worse.  If you need help quitting, talk to your doctor about stop-smoking programs and medicines. These can increase your chances of quitting for good. · Take steps to control your stress and workload. Learn relaxation techniques. ¨ Share your feelings. Stress and tension affect our emotions. By expressing your feelings to others, you may be able to understand and cope with them. ¨ Consider joining a support group. Talking about a problem with your spouse, a good friend, or other people with similar problems is a good way to reduce tension and stress. ¨ Express yourself through art. Try writing, crafts, dance, or art to relieve stress. Some dance, writing, or art groups may be available just for people who have cancer. ¨ Be kind to your body and mind. Getting enough sleep, eating a healthy diet, and taking time to do things you enjoy can contribute to an overall feeling of balance in your life and can help reduce stress. ¨ Get help if you need it. Discuss your concerns with your doctor or counselor. · If you are vomiting or have diarrhea: ¨ Drink plenty of fluids (enough so that your urine is light yellow or clear like water) to prevent dehydration. Choose water and other caffeine-free clear liquids. If you have kidney, heart, or liver disease and have to limit fluids, talk with your doctor before you increase the amount of fluids you drink. ¨ When you are able to eat, try clear soups, mild foods, and liquids until all symptoms are gone for 12 to 48 hours. Other good choices include dry toast, crackers, cooked cereal, and gelatin dessert, such as Jell-O. · If you have not already done so, prepare a list of advance directives. Advance directives are instructions to your doctor and family members about what kind of care you want if you become unable to speak or express yourself. When should you call for help? Call 911 anytime you think you may need emergency care. For example, call if: 
? · You passed out (lost consciousness). ?Call your doctor now or seek immediate medical care if: 
? · You have a fever. ? · You have abnormal bleeding. ? · You think you have an infection. ? · You have new or worse pain. ? · You have new symptoms, such as a cough, belly pain, vomiting, diarrhea, or a rash. ? Watch closely for changes in your health, and be sure to contact your doctor if: 
? · You are much more tired than usual.  
? · You have swollen glands in your armpits, groin, or neck. ? · You do not get better as expected. Where can you learn more? Go to http://hanna-dharmesh.info/. Enter V321 in the search box to learn more about \"Breast Cancer: Care Instructions. \" Current as of: May 12, 2017 Content Version: 11.4 © 2972-3492 [employer]. Care instructions adapted under license by [employer] (which disclaims liability or warranty for this information). If you have questions about a medical condition or this instruction, always ask your healthcare professional. [place] [address] any warranty or liability for your use of this information. Introducing [facility]! [facility] introduces [employer] patient portal. Now you can access parts of your medical record, email your doctor's office, and request medication refills online. 1. In your internet browser, go to [email or web address] 2. Click on the First Time User? Click Here link in the Sign In box. You will see the New Member Sign Up page. 3. Enter your [employer] Access Code exactly as it appears below. You will not need to use this code after youve completed the sign-up process. If you do not sign up before the expiration date, you must request a new code. · [employer] Access Code: [email or web address] Expires: 5/10/2018  2:07 PM 
 
4. Enter the last four digits of your Social Security Number (xxxx) and Date of Birth (mm/dd/yyyy) as indicated and click Submit.  You will be taken to the next sign-up page. 5. Create a [employer] ID. This will be your [employer] login ID and cannot be changed, so think of one that is secure and easy to remember. 6. Create a [employer] password. You can change your password at any time. 7. Enter your Password Reset Question and Answer. This can be used at a later time if you forget your password. 8. Enter your e-mail address. You will receive e-mail notification when new information is available in [address]. 9. Click Sign Up. You can now view and download portions of your medical record. 10. Click the Download Summary menu link to download a portable copy of your medical information. If you have questions, please visit the Frequently Asked Questions section of the [employer] website. Remember, [employer] is NOT to be used for urgent needs. For medical emergencies, dial 911. Now available from your iPhone and Android! Please provide this summary of care documentation to your next provider. Your primary care clinician is listed as Jd Osman. If you have any questions after today's visit, please call [phone].

## 2018-05-09 LAB
A-G RATIO,AGRAT: 1.1 RATIO (ref 1.1–2.6)
ALBUMIN SERPL-MCNC: 4.1 G/DL (ref 3.5–5)
ALP SERPL-CCNC: 77 U/L (ref 25–115)
ALT SERPL-CCNC: 15 U/L (ref 5–40)
ANION GAP SERPL CALC-SCNC: 17 MMOL/L
AST SERPL W P-5'-P-CCNC: 22 U/L (ref 10–37)
BILIRUB SERPL-MCNC: 0.5 MG/DL (ref 0.2–1.2)
BUN SERPL-MCNC: 16 MG/DL (ref 6–22)
CALCIUM SERPL-MCNC: 9.6 MG/DL (ref 8.4–10.5)
CHLORIDE SERPL-SCNC: 98 MMOL/L (ref 98–110)
CO2 SERPL-SCNC: 25 MMOL/L (ref 20–32)
CREAT SERPL-MCNC: 0.7 MG/DL (ref 0.5–1.2)
FE % SATURATION,PSAT: 33 % (ref 20–50)
FERRITIN SERPL-MCNC: 595 NG/ML (ref 10–291)
GFRAA, 66117: >60
GFRNA, 66118: >60
GLOBULIN,GLOB: 3.6 G/DL (ref 2–4)
GLUCOSE SERPL-MCNC: 91 MG/DL (ref 70–99)
IRON,IRN: 84 MCG/DL (ref 30–160)
POTASSIUM SERPL-SCNC: 3.8 MMOL/L (ref 3.5–5.5)
PROT SERPL-MCNC: 7.7 G/DL (ref 6.4–8.3)
SODIUM SERPL-SCNC: 140 MMOL/L (ref 133–145)
TIBC,TIBC: 252 MCG/DL (ref 228–428)
UIBC SERPL-MCNC: 168 MCG/DL (ref 110–370)

## 2018-05-10 LAB — CA 27.29, 142134: 23.9 U/ML

## 2018-08-07 ENCOUNTER — HOSPITAL ENCOUNTER (OUTPATIENT)
Dept: ONCOLOGY | Age: 58
Discharge: HOME | End: 2018-08-07

## 2018-08-07 ENCOUNTER — OFFICE VISIT (OUTPATIENT)
Dept: ONCOLOGY | Age: 58
End: 2018-08-07

## 2018-08-07 VITALS
TEMPERATURE: 98.2 F | HEIGHT: 64 IN | SYSTOLIC BLOOD PRESSURE: 139 MMHG | HEART RATE: 57 BPM | WEIGHT: 245.2 LBS | DIASTOLIC BLOOD PRESSURE: 78 MMHG | BODY MASS INDEX: 41.86 KG/M2

## 2018-08-07 DIAGNOSIS — G62.0 CHEMOTHERAPY-INDUCED PERIPHERAL NEUROPATHY (HCC): ICD-10-CM

## 2018-08-07 DIAGNOSIS — G89.4 CHRONIC PAIN SYNDROME: ICD-10-CM

## 2018-08-07 DIAGNOSIS — D05.10 DUCTAL CARCINOMA IN SITU (DCIS) OF BREAST, UNSPECIFIED LATERALITY: ICD-10-CM

## 2018-08-07 DIAGNOSIS — M19.90 ARTHRITIS: ICD-10-CM

## 2018-08-07 DIAGNOSIS — G62.9 NEUROPATHY: ICD-10-CM

## 2018-08-07 DIAGNOSIS — T45.1X5A CHEMOTHERAPY-INDUCED PERIPHERAL NEUROPATHY (HCC): ICD-10-CM

## 2018-08-07 DIAGNOSIS — C50.911 INVASIVE DUCTAL CARCINOMA OF RIGHT BREAST, STAGE 1 (HCC): Primary | ICD-10-CM

## 2018-08-07 LAB
BASO+EOS+MONOS # BLD AUTO: 0.4 K/UL (ref 0–2.3)
BASO+EOS+MONOS # BLD AUTO: 7 % (ref 0.1–17)
DIFFERENTIAL METHOD BLD: ABNORMAL
ERYTHROCYTE [DISTWIDTH] IN BLOOD BY AUTOMATED COUNT: 13.6 % (ref 11.5–14.5)
HCT VFR BLD AUTO: 39.3 % (ref 36–48)
HGB BLD-MCNC: 12.2 G/DL (ref 12–16)
LYMPHOCYTES # BLD: 2.4 K/UL (ref 1.1–5.9)
LYMPHOCYTES NFR BLD: 41 % (ref 14–44)
MCH RBC QN AUTO: 23.4 PG (ref 25–35)
MCHC RBC AUTO-ENTMCNC: 31 G/DL (ref 31–37)
MCV RBC AUTO: 75.3 FL (ref 78–102)
NEUTS SEG # BLD: 2.9 K/UL (ref 1.8–9.5)
NEUTS SEG NFR BLD: 52 % (ref 40–70)
PLATELET # BLD AUTO: 259 K/UL (ref 140–440)
RBC # BLD AUTO: 5.22 M/UL (ref 4.1–5.1)
WBC # BLD AUTO: 5.7 K/UL (ref 4.5–13)

## 2018-08-07 RX ORDER — OXYCODONE AND ACETAMINOPHEN 5; 325 MG/1; MG/1
1 TABLET ORAL
Qty: 60 TAB | Refills: 0 | Status: SHIPPED | OUTPATIENT
Start: 2018-08-07 | End: 2019-07-12 | Stop reason: SDUPTHER

## 2018-08-07 NOTE — PATIENT INSTRUCTIONS
Complete Blood Count (CBC): About This Test  What is it? A complete blood count (CBC) is a blood test that gives important information about your blood cells, especially red blood cells, white blood cells, and platelets. Why is this test done? A CBC may be done as part of a regular physical exam. There are many other reasons that a doctor may want this blood test, including to:  · Find the cause of symptoms such as fatigue, weakness, fever, bruising, or weight loss. · Find anemia or an infection. · See how much blood has been lost if there is bleeding. · Diagnose diseases of the blood, such as leukemia or polycythemia. How can you prepare for the test?  You do not need to do anything before having this test.  What happens during the test?  The health professional taking a sample of your blood will:  · Wrap an elastic band around your upper arm. This makes the veins below the band larger so it is easier to put a needle into the vein. · Clean the needle site with alcohol. · Put the needle into the vein. · Attach a tube to the needle to fill it with blood. · Remove the band from your arm when enough blood is collected. · Put a gauze pad or cotton ball over the needle site as the needle is removed. · Put pressure on the site and then put on a bandage. If this blood test is done on a baby, a heel stick may be done instead of a blood draw from a vein. What happens after the test?  · You will probably be able to go home right away. · You can go back to your usual activities right away. Follow-up care is a key part of your treatment and safety. Be sure to make and go to all appointments, and call your doctor if you are having problems. It's also a good idea to keep a list of the medicines you take. Ask your doctor when you can expect to have your test results. Where can you learn more? Go to http://hanna-dharmesh.info/.   Enter I830 in the search box to learn more about \"Complete Blood Count (CBC): About This Test.\"  Current as of: October 9, 2017  Content Version: 11.7  © 2948-2394 [employer]. Care instructions adapted under license by [employer] (which disclaims liability or warranty for this information). If you have questions about a medical condition or this instruction, always ask your healthcare professional. [address] any warranty or liability for your use of this information. Breast Cancer: Care Instructions  Your Care Instructions    Breast cancer occurs when abnormal cells grow out of control in the breast. These cancer cells can spread within the breast, to nearby lymph nodes and other tissues, and to other parts of the body. Being treated for cancer can weaken your body, and you may feel very tired. Get the rest your body needs so you can feel better. Finding out that you have cancer is scary. You may feel many emotions and may need some help coping. Seek out family, friends, and counselors for support. You also can do things at home to make yourself feel better while you go through treatment. Call the [employer] (5-874.157.6059) or visit its website at 2590 Adhesive.co for more information. Follow-up care is a key part of your treatment and safety. Be sure to make and go to all appointments, and call your doctor if you are having problems. It's also a good idea to know your test results and keep a list of the medicines you take. How can you care for yourself at home? · Take your medicines exactly as prescribed. Call your doctor if you think you are having a problem with your medicine. You may get medicine for nausea and vomiting if you have these side effects. · Follow your doctor's instructions to relieve pain. Pain from cancer and surgery can almost always be controlled. Use pain medicine when you first notice pain, before it becomes severe. · Eat healthy food.  If you do not feel like eating, try to eat food that has protein and extra calories to keep up your strength and prevent weight loss. Drink liquid meal replacements for extra calories and protein. Try to eat your main meal early. · Get some physical activity every day, but do not get too tired. Keep doing the hobbies you enjoy as your energy allows. · Do not smoke. Smoking can make your cancer worse. If you need help quitting, talk to your doctor about stop-smoking programs and medicines. These can increase your chances of quitting for good. · Take steps to control your stress and workload. Learn relaxation techniques. ¨ Share your feelings. Stress and tension affect our emotions. By expressing your feelings to others, you may be able to understand and cope with them. ¨ Consider joining a support group. Talking about a problem with your spouse, a good friend, or other people with similar problems is a good way to reduce tension and stress. ¨ Express yourself through art. Try writing, crafts, dance, or art to relieve stress. Some dance, writing, or art groups may be available just for people who have cancer. ¨ Be kind to your body and mind. Getting enough sleep, eating a healthy diet, and taking time to do things you enjoy can contribute to an overall feeling of balance in your life and can help reduce stress. ¨ Get help if you need it. Discuss your concerns with your doctor or counselor. · If you are vomiting or have diarrhea:  ¨ Drink plenty of fluids (enough so that your urine is light yellow or clear like water) to prevent dehydration. Choose water and other caffeine-free clear liquids. If you have kidney, heart, or liver disease and have to limit fluids, talk with your doctor before you increase the amount of fluids you drink. ¨ When you are able to eat, try clear soups, mild foods, and liquids until all symptoms are gone for 12 to 48 hours.  Other good choices include dry toast, crackers, cooked cereal, and gelatin dessert, such as Pavel.  · If you have not already done so, prepare a list of advance directives. Advance directives are instructions to your doctor and family members about what kind of care you want if you become unable to speak or express yourself. When should you call for help? Call 911 anytime you think you may need emergency care. For example, call if:    · You passed out (lost consciousness).    Call your doctor now or seek immediate medical care if:    · You have a fever.     · You have abnormal bleeding.     · You think you have an infection.     · You have new or worse pain.     · You have new symptoms, such as a cough, belly pain, vomiting, diarrhea, or a rash.    Watch closely for changes in your health, and be sure to contact your doctor if:    · You are much more tired than usual.     · You have swollen glands in your armpits, groin, or neck.     · You do not get better as expected. Where can you learn more? Go to http://hanna-dharmesh.info/. Enter V321 in the search box to learn more about \"Breast Cancer: Care Instructions. \"  Current as of: May 12, 2017  Content Version: 11.7  © 3567-2550 [employer]. Care instructions adapted under license by [employer] (which disclaims liability or warranty for this information). If you have questions about a medical condition or this instruction, always ask your healthcare professional. [address] any warranty or liability for your use of this information.

## 2018-08-07 NOTE — PROGRESS NOTES
Hematology/Oncology  Progress Note    Name: Iveth Barros  Date: 2018  : 1960    PCP: Deanna Garcia MD     Ms. José Graves is a 62 y.o.  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  Current therapy: The patient completed adjuvant systemic chemotherapy      Subjective:     Ms. José Graves is a 59-year-old [facility] American woman who has a history of DCIS involving the left breast and invasive ductal adenocarcinoma of the right breast. The patient has done well since she completed her systemic chemotherapy. She also underwent bilateral mastectomies. She decided not to proceed with reconstruction. She is requesting for a new prescription of her percocet. She has no physical complaints at this time. Past medical history, family history, and social history: these were reviewed and remains unchanged. Past Medical History:   Diagnosis Date    Cancer [facility])     right breast cancer     DDD (degenerative disc disease), cervical 2017    Hypercholesterolemia     Hypertension      Past Surgical History:   Procedure Laterality Date    HX BREAST RECONSTRUCTION  2014    BILATERAL BREAST RECONSTRUCTION WITH TISSUE EXPANDERS performed by Fadia Hatfield MD at [facility] MAIN OR    HX  SECTION      HX CHOLECYSTECTOMY      HX GYN      fibroid tumors removed    HX MASTECTOMY  2014    BILATERAL BREAST MASTECTOMY SIMPLE performed by Edgar Salmon MD at [facility] MAIN OR    HX TUBAL LIGATION      HX VASCULAR ACCESS      left chest     Social History     Social History    Marital status:      Spouse name: N/A    Number of children: N/A    Years of education: N/A     Occupational History    Not on file.      Social History Main Topics    Smoking status: Former Smoker     Types: Cigarettes    Smokeless tobacco: Never Used      Comment: quit 2014    Alcohol use No      Comment: Socially    Drug use: No    Sexual activity: Not Currently     Other Topics Concern    Not on file     Social History Narrative     Family History   Problem Relation Age of Onset    Hypertension Mother     Diabetes Father     Diabetes Daughter     Diabetes Son      Current Outpatient Prescriptions   Medication Sig Dispense Refill    oxyCODONE-acetaminophen (PERCOCET) 5-325 mg per tablet Take 1 Tab by mouth every six (6) hours as needed for Pain. Max Daily Amount: 4 Tabs. 60 Tab 0    gabapentin (NEURONTIN) 100 mg capsule Take 2 Caps by mouth three (3) times daily. 180 Cap 11    meloxicam (MOBIC) 15 mg tablet Take 1 Tab by mouth daily. 30 Tab 0    lovastatin (MEVACOR) 20 mg tablet Take 1 Tab by mouth daily. 90 Tab 1    triamterene-hydroCHLOROthiazide (DYAZIDE) 37.5-25 mg per capsule Take 1 Cap by mouth daily. 90 Cap 1    ferrous sulfate (IRON) 325 mg (65 mg iron) tablet Take 325 mg by mouth Daily (before breakfast). Indications: IRON DEFICIENCY ANEMIA         Review of Systems  Constitutional: The patient has no acute distress or discomfort. HEENT: The patient denies recent head trauma, eye pain, blurred vision,  hearing deficit, oropharyngeal mucosal pain or lesions, and the patient denies throat pain or discomfort. Lymphatics: The patient denies palpable peripheral lymphadenopathy. Hematologic: The patient denies having bruising, bleeding, or progressive fatigue. Respiratory: Patient denies having shortness of breath, cough, sputum production, fever, or dyspnea on exertion. Cardiovascular: The patient denies having leg pain, leg swelling, heart palpitations, chest permit, chest pain, or lightheadedness. The patient denies having dyspnea on exertion. Gastrointestinal: The patient denies having nausea, emesis, or diarrhea. The patient denies having any hematemesis or blood in the stool. Genitourinary: Patient denies having urinary urgency, frequency, or dysuria. The patient denies having blood in the urine.   Psychological: The patient denies having symptoms of nervousness, anxiety, depression, or thoughts of harming self. Skin: Patient denies having skin rashes, skin, ulcerations, or unexplained itching or pruritus. Musculoskeletal: The patient denies having pain in the joints or bones. Objective:     Visit Vitals    /78 (BP 1 Location: Left arm, BP Patient Position: Sitting)    Pulse (!) 57    Temp 98.2 °F (36.8 °C)    Ht 5' 4\" (1.626 m)    Wt 111.2 kg (245 lb 3.2 oz)    BMI 42.09 kg/m2     ECOG PS=0, pain score=0/10   Physical Exam:   Gen. Appearance: The patient is in no acute distress. Skin: There is no bruise or rash. HEENT: The exam is unremarkable. Neck: Supple without lymphadenopathy or thyromegaly. Lungs: Clear to auscultation and percussion; there are no wheezes or rhonchi. Heart: Regular rate and rhythm; there are no murmurs, gallops, or rubs. Chest Wall and Axilla: No palpable masses, no evidence of disease recurrence. Abdomen: Bowel sounds are present and normal.  There is no guarding, tenderness, or hepatosplenomegaly. Extremities: There is no clubbing, cyanosis, or edema. Neurologic: There are no focal neurologic deficits. Lymphatics: There is no palpable peripheral lymphadenopathy. Musculoskeletal: The patient has full range of motion at all joints. There is no evidence of joint deformity or effusions. There is no focal joint tenderness. Psychological/psychiatric: There is no clinical evidence of anxiety, depression, or melancholy.     Lab data:      Results for orders placed or performed during the hospital encounter of 08/07/18   CBC WITH 3 PART DIFF     Status: Abnormal   Result Value Ref Range Status    WBC 5.7 4.5 - 13.0 K/uL Final    RBC 5.22 (H) 4.10 - 5.10 M/uL Final    HGB 12.2 12.0 - 16.0 g/dL Final    HCT 39.3 36 - 48 % Final    MCV 75.3 (L) 78 - 102 FL Final    MCH 23.4 (L) 25.0 - 35.0 PG Final    MCHC 31.0 31 - 37 g/dL Final    RDW 13.6 11.5 - 14.5 % Final    PLATELET 917 977 - 976 K/uL Final    NEUTROPHILS 52 40 - 70 % Final    MIXED CELLS 7 0.1 - 17 % Final    LYMPHOCYTES 41 14 - 44 % Final    ABS. NEUTROPHILS 2.9 1.8 - 9.5 K/UL Final    ABS. MIXED CELLS 0.4 0.0 - 2.3 K/uL Final    ABS. LYMPHOCYTES 2.4 1.1 - 5.9 K/UL Final     Comment: Test performed at [place] [id number] Location. Results Reviewed by Medical Director. DF AUTOMATED   Final           Assessment:     1. Invasive ductal carcinoma of right breast, stage 1 (HCC)    2. Ductal carcinoma in situ (DCIS) of breast, unspecified laterality    3. Neuropathy    4. Arthritis    5. Chemotherapy-induced peripheral neuropathy ([facility] [address]    6. Chronic pain syndrome      Plan:     DCIS left breast/invasive ductal carcinoma right breast the patient is doing well and clinically there is no evidence of disease recurrence. I have instructed the patient to continue doing her chest wall and axilla exam on a monthly basis. The most recent CA-27-29 level from 5/8/2018 was normal at 23.9 U/mL. Chemotherapy-induced peripheral neuropathy: I have instructed the patient to continue taking the Neurontin 200 mg 3 times daily. She also takes Percocet 5 mg every 4-6 hours p.r.n. I will renew her Rx today. Additionally, she takes Motrin 800 mg 3 times daily with food. She will continue to take these medications as previously noted. Arthritis: I have instructed the patient to continue using her Motrin and exercises as needed for arthritis. She also takes the Percocet 5 mg every 4-6 hours as well and this offers some benefit. I plan to see the patient in 3 months or sooner if indicated.     Orders Placed This Encounter    COMPLETE CBC & AUTO DIFF WBC    InHouse CBC ([employer])     Standing Status:   Future     Number of Occurrences:   1     Standing Expiration Date:   5/18/0338    METABOLIC PANEL, COMPREHENSIVE     Standing Status:   Future     Number of Occurrences:   1     Standing Expiration Date:   8/8/2019    CA 27.29     Standing Status: Future     Number of Occurrences:   1     Standing Expiration Date:   8/8/2019    oxyCODONE-acetaminophen (PERCOCET) 5-325 mg per tablet     Sig: Take 1 Tab by mouth every six (6) hours as needed for Pain. Max Daily Amount: 4 Tabs.      Dispense:  60 Tab     Refill:  0       Belinda Dixon MD  8/7/2018

## 2018-08-07 NOTE — MR AVS SNAPSHOT
[address] [facility] [address] [facility] [address] 
[phone] Patient: Charlene Kern MRN: [id number] PYS:[birth year] Visit Information Date & Time Provider Department Dept. Phone Encounter #  
 8/7/2018 10:30 AM MD Zohra Evangelista [facility]  [phone] [id number] Your Appointments 12/7/2018 10:15 AM  
Office Visit with MD Zohra Evangelista Doctors  [facility] Appt Note: OV  
 [facility] [address] [facility] [address]  
[phone]  
  
   
 [facility] [address] Upcoming Health Maintenance Date Due Pneumococcal 19-64 Highest Risk (1 of 3 - PCV13) 1/11/1979 DTaP/Tdap/Td series (1 - Tdap) 1/11/1981 FOBT Q 1 YEAR AGE 50-75 3/8/2017 Influenza Age 5 to Adult 8/1/2018 PAP AKA CERVICAL CYTOLOGY 12/10/2018 BREAST CANCER SCRN MAMMOGRAM 3/10/2019 Allergies as of 8/7/2018  Review Complete On: 8/7/2018 By: Homero Mahan MD  
 No Known Allergies Current Immunizations  Reviewed on 9/15/2015 Name Date Influenza Vaccine 9/15/2015, 11/18/2014 Influenza Vaccine (Quad) PF 9/29/2017 Not reviewed this visit You Were Diagnosed With   
  
 Codes Comments Invasive ductal carcinoma of right breast, stage 1 ([facility] [address]    -  Primary ICD-10-CM: C50.911 ICD-9-CM: 174.9 Ductal carcinoma in situ (DCIS) of breast, unspecified laterality     ICD-10-CM: D05.10 ICD-9-CM: 233.0 Neuropathy     ICD-10-CM: G62.9 ICD-9-CM: 355.9 Arthritis     ICD-10-CM: M19.90 ICD-9-CM: 716.90 Chemotherapy-induced peripheral neuropathy (HCC)     ICD-10-CM: G62.0, T45.1X5A 
ICD-9-CM: 357.7, E933.1 Vitals  BP Pulse Temp Height(growth percentile) Weight(growth percentile) BMI  
 139/78 (BP 1 Location: Left arm, BP Patient Position: Sitting) (!) 57 98.2 °F (36.8 °C) 5' 4\" (1.626 m) 245 lb 3.2 oz (111.2 kg) 42.09 kg/m2 OB Status Smoking Status Postmenopausal Former Smoker BMI and BSA Data Body Mass Index Body Surface Area 42.09 kg/m 2 2.24 m 2 Preferred Pharmacy Pharmacy Name Phone [facility] [address] [facility], [address] [place] [address] Avenue [phone] Your Updated Medication List  
  
   
This list is accurate as of 8/7/18 11:09 AM.  Always use your most recent med list.  
  
  
  
  
 gabapentin 100 mg capsule Commonly known as:  NEURONTIN Take 2 Caps by mouth three (3) times daily. Iron 325 mg (65 mg iron) tablet Generic drug:  ferrous sulfate Take 325 mg by mouth Daily (before breakfast). Indications: IRON DEFICIENCY ANEMIA  
  
 lovastatin 20 mg tablet Commonly known as:  MEVACOR Take 1 Tab by mouth daily. meloxicam 15 mg tablet Commonly known as:  MOBIC Take 1 Tab by mouth daily. oxyCODONE-acetaminophen 5-325 mg per tablet Commonly known as:  PERCOCET Take 1 Tab by mouth every six (6) hours as needed for Pain. Max Daily Amount: 4 Tabs. triamterene-hydroCHLOROthiazide 37.5-25 mg per capsule Commonly known as:  Eboni Root Take 1 Cap by mouth daily. We Performed the Following COMPLETE CBC & AUTO DIFF WBC [36318 CPT(R)] To-Do List   
 08/07/2018 Lab:  CA 27.29   
  
 08/07/2018 Lab:  CBC WITH 3 PART DIFF   
  
 08/07/2018 Lab:  METABOLIC PANEL, COMPREHENSIVE Patient Instructions Complete Blood Count (CBC): About This Test 
What is it? A complete blood count (CBC) is a blood test that gives important information about your blood cells, especially red blood cells, white blood cells, and platelets. Why is this test done? A CBC may be done as part of a regular physical exam. There are many other reasons that a doctor may want this blood test, including to: · Find the cause of symptoms such as fatigue, weakness, fever, bruising, or weight loss. · Find anemia or an infection. · See how much blood has been lost if there is bleeding. · Diagnose diseases of the blood, such as leukemia or polycythemia. How can you prepare for the test? 
You do not need to do anything before having this test. 
What happens during the test? 
The health professional taking a sample of your blood will: · Wrap an elastic band around your upper arm. This makes the veins below the band larger so it is easier to put a needle into the vein. · Clean the needle site with alcohol. · Put the needle into the vein. · Attach a tube to the needle to fill it with blood. · Remove the band from your arm when enough blood is collected. · Put a gauze pad or cotton ball over the needle site as the needle is removed. · Put pressure on the site and then put on a bandage. If this blood test is done on a baby, a heel stick may be done instead of a blood draw from a vein. What happens after the test? 
· You will probably be able to go home right away. · You can go back to your usual activities right away. Follow-up care is a key part of your treatment and safety. Be sure to make and go to all appointments, and call your doctor if you are having problems. It's also a good idea to keep a list of the medicines you take. Ask your doctor when you can expect to have your test results. Where can you learn more? Go to http://hanna-dharmesh.info/. Enter U177 in the search box to learn more about \"Complete Blood Count (CBC): About This Test.\" Current as of: October 9, 2017 Content Version: 11.7 © 3730-6499 [employer]. Care instructions adapted under license by [employer] (which disclaims liability or warranty for this information).  If you have questions about a medical condition or this instruction, always ask your healthcare professional. [employer] disclaims any warranty or liability for your use of this information. Introducing \[facility] [place] [facility]\"" & HEALTH SERVICES! [employer] introduces [email or web address] Application patient portal. Now you can access parts of your medical record, email your doctor's office, and request medication refills online. 1. In your internet browser, go to [email or web address] 2. Click on the First Time User? Click Here link in the Sign In box. You will see the New Member Sign Up page. 3. Enter your [email or web address] Application Access Code exactly as it appears below. You will not need to use this code after youve completed the sign-up process. If you do not sign up before the expiration date, you must request a new code. · [email or web address] Application Access Code: [email or web address] Expires: 11/5/2018 11:09 AM 
 
4. Enter the last four digits of your Social Security Number (xxxx) and Date of Birth (mm/dd/yyyy) as indicated and click Submit. You will be taken to the next sign-up page. 5. Create a [email or web address] Application ID. This will be your [email or web address] Application login ID and cannot be changed, so think of one that is secure and easy to remember. 6. Create a [email or web address] Application password. You can change your password at any time. 7. Enter your Password Reset Question and Answer. This can be used at a later time if you forget your password. 8. Enter your e-mail address. You will receive e-mail notification when new information is available in [address] 9. Click Sign Up. You can now view and download portions of your medical record. 10. Click the Download Summary menu link to download a portable copy of your medical information. If you have questions, please visit the Frequently Asked Questions section of the [email or web address] Application website. Remember, [email or web address] Application is NOT to be used for urgent needs. For medical emergencies, dial 911. Now available from your iPhone and Android! Please provide this summary of care documentation to your next provider. Your primary care clinician is listed as Kenneth Sanchez. If you have any questions after today's visit, please call [phone].

## 2018-08-08 LAB
A-G RATIO,AGRAT: 1.1 RATIO (ref 1.1–2.6)
ALBUMIN SERPL-MCNC: 4.1 G/DL (ref 3.5–5)
ALP SERPL-CCNC: 82 U/L (ref 25–115)
ALT SERPL-CCNC: 17 U/L (ref 5–40)
ANION GAP SERPL CALC-SCNC: 15 MMOL/L
AST SERPL W P-5'-P-CCNC: 16 U/L (ref 10–37)
BILIRUB SERPL-MCNC: 0.4 MG/DL (ref 0.2–1.2)
BUN SERPL-MCNC: 14 MG/DL (ref 6–22)
CALCIUM SERPL-MCNC: 9.7 MG/DL (ref 8.4–10.5)
CHLORIDE SERPL-SCNC: 97 MMOL/L (ref 98–110)
CO2 SERPL-SCNC: 28 MMOL/L (ref 20–32)
CREAT SERPL-MCNC: 0.6 MG/DL (ref 0.5–1.2)
GFRAA, 66117: >60
GFRNA, 66118: >60
GLOBULIN,GLOB: 3.6 G/DL (ref 2–4)
GLUCOSE SERPL-MCNC: 90 MG/DL (ref 70–99)
POTASSIUM SERPL-SCNC: 4.1 MMOL/L (ref 3.5–5.5)
PROT SERPL-MCNC: 7.7 G/DL (ref 6.4–8.3)
SODIUM SERPL-SCNC: 140 MMOL/L (ref 133–145)

## 2018-08-09 LAB — CA 27.29, 142134: 26.8 U/ML

## 2018-12-07 ENCOUNTER — OFFICE VISIT (OUTPATIENT)
Dept: ONCOLOGY | Age: 58
End: 2018-12-07

## 2018-12-07 ENCOUNTER — HOSPITAL ENCOUNTER (OUTPATIENT)
Dept: ONCOLOGY | Age: 58
Discharge: HOME | End: 2018-12-07

## 2018-12-07 VITALS
OXYGEN SATURATION: 100 % | DIASTOLIC BLOOD PRESSURE: 73 MMHG | RESPIRATION RATE: 16 BRPM | SYSTOLIC BLOOD PRESSURE: 120 MMHG | HEIGHT: 64 IN | TEMPERATURE: 97.9 F | WEIGHT: 244 LBS | HEART RATE: 67 BPM | BODY MASS INDEX: 41.66 KG/M2

## 2018-12-07 DIAGNOSIS — C50.911 INVASIVE DUCTAL CARCINOMA OF RIGHT BREAST, STAGE 1 (HCC): ICD-10-CM

## 2018-12-07 DIAGNOSIS — M19.90 ARTHRITIS: ICD-10-CM

## 2018-12-07 DIAGNOSIS — G62.0 CHEMOTHERAPY-INDUCED PERIPHERAL NEUROPATHY (HCC): ICD-10-CM

## 2018-12-07 DIAGNOSIS — C50.911 INVASIVE DUCTAL CARCINOMA OF RIGHT BREAST, STAGE 1 (HCC): Primary | ICD-10-CM

## 2018-12-07 DIAGNOSIS — T45.1X5A CHEMOTHERAPY-INDUCED PERIPHERAL NEUROPATHY (HCC): ICD-10-CM

## 2018-12-07 DIAGNOSIS — G89.4 CHRONIC PAIN SYNDROME: ICD-10-CM

## 2018-12-07 DIAGNOSIS — D05.10 DUCTAL CARCINOMA IN SITU (DCIS) OF BREAST, UNSPECIFIED LATERALITY: ICD-10-CM

## 2018-12-07 LAB
BASO+EOS+MONOS # BLD AUTO: 0.6 K/UL (ref 0–2.3)
BASO+EOS+MONOS # BLD AUTO: 10 % (ref 0.1–17)
DIFFERENTIAL METHOD BLD: ABNORMAL
ERYTHROCYTE [DISTWIDTH] IN BLOOD BY AUTOMATED COUNT: 13.7 % (ref 11.5–14.5)
HCT VFR BLD AUTO: 38.7 % (ref 36–48)
HGB BLD-MCNC: 11.9 G/DL (ref 12–16)
LYMPHOCYTES # BLD: 2.4 K/UL (ref 1.1–5.9)
LYMPHOCYTES NFR BLD: 39 % (ref 14–44)
MCH RBC QN AUTO: 23.3 PG (ref 25–35)
MCHC RBC AUTO-ENTMCNC: 30.7 G/DL (ref 31–37)
MCV RBC AUTO: 75.7 FL (ref 78–102)
NEUTS SEG # BLD: 3.3 K/UL (ref 1.8–9.5)
NEUTS SEG NFR BLD: 51 % (ref 40–70)
PLATELET # BLD AUTO: 265 K/UL (ref 140–440)
RBC # BLD AUTO: 5.11 M/UL (ref 4.1–5.1)
WBC # BLD AUTO: 6.3 K/UL (ref 4.5–13)

## 2018-12-07 RX ORDER — OXYCODONE AND ACETAMINOPHEN 5; 325 MG/1; MG/1
1 TABLET ORAL
Qty: 60 TAB | Refills: 0 | Status: SHIPPED | OUTPATIENT
Start: 2018-12-07 | End: 2019-03-08 | Stop reason: SDUPTHER

## 2018-12-07 NOTE — PATIENT INSTRUCTIONS

## 2018-12-07 NOTE — PROGRESS NOTES
Hematology/Oncology  Progress Note Name: Leonidas Villalobos Date: 2018 : 1960 PCP: Marito Laird MD  
 
Ms. Joylene Ormond is a 62 y.o.  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 
Current therapy: The patient completed adjuvant systemic chemotherapy Subjective:  
 
Ms. Joylene Ormond is a 51-year-old [place] American woman who has a history of DCIS involving the left breast and invasive ductal adenocarcinoma of the right breast. The patient has done well since she completed her systemic chemotherapy. She also underwent bilateral mastectomies. She decided not to proceed with reconstruction. She is requesting for a new prescription of her percocet. She has no physical complaints at this time. Past medical history, family history, and social history: these were reviewed and remains unchanged. Past Medical History:  
Diagnosis Date  Cancer ([facility] [address] right breast cancer  DDD (degenerative disc disease), cervical 2017  Hypercholesterolemia  Hypertension Past Surgical History:  
Procedure Laterality Date  HX BREAST RECONSTRUCTION  2014 BILATERAL BREAST RECONSTRUCTION WITH TISSUE EXPANDERS performed by Precious Rollins MD at [address] [place] [address] HX  SECTION    
 HX CHOLECYSTECTOMY  HX GYN    
 fibroid tumors removed  HX MASTECTOMY  2014 BILATERAL BREAST MASTECTOMY SIMPLE performed by Alfredo Sapp MD at [facility] MAIN OR  
 HX TUBAL LIGATION    
 HX VASCULAR ACCESS    
 left chest  
 
Social History Socioeconomic History  Marital status:  Spouse name: Not on file  Number of children: Not on file  Years of education: Not on file  Highest education level: Not on file Social Needs  Financial resource strain: Not on file  Food insecurity - worry: Not on file  Food insecurity - inability: Not on file  Transportation needs - medical: Not on file  Transportation needs - non-medical: Not on file Occupational History  Not on file Tobacco Use  Smoking status: Former Smoker Types: Cigarettes  Smokeless tobacco: Never Used  Tobacco comment: quit 6/2014 Substance and Sexual Activity  Alcohol use: No  
  Comment: Socially  Drug use: No  
 Sexual activity: Not Currently Other Topics Concern  Not on file Social History Narrative  Not on file Family History Problem Relation Age of Onset  Hypertension Mother  Diabetes Father  Diabetes Daughter  Diabetes Son   
 
Current Outpatient Medications Medication Sig Dispense Refill  oxyCODONE-acetaminophen (PERCOCET) 5-325 mg per tablet Take 1 Tab by mouth every six (6) hours as needed for Pain. Max Daily Amount: 4 Tabs. 60 Tab 0  
 oxyCODONE-acetaminophen (PERCOCET) 5-325 mg per tablet Take 1 Tab by mouth every six (6) hours as needed for Pain. Max Daily Amount: 4 Tabs. 60 Tab 0  
 gabapentin (NEURONTIN) 100 mg capsule Take 2 Caps by mouth three (3) times daily. 180 Cap 11  
 meloxicam (MOBIC) 15 mg tablet Take 1 Tab by mouth daily. 30 Tab 0  
 lovastatin (MEVACOR) 20 mg tablet Take 1 Tab by mouth daily. 90 Tab 1  
 triamterene-hydroCHLOROthiazide (DYAZIDE) 37.5-25 mg per capsule Take 1 Cap by mouth daily. 90 Cap 1  
 ferrous sulfate (IRON) 325 mg (65 mg iron) tablet Take 325 mg by mouth Daily (before breakfast). Indications: IRON DEFICIENCY ANEMIA Review of Systems Constitutional: The patient has no acute distress or discomfort. HEENT: The patient denies recent head trauma, eye pain, blurred vision,  hearing deficit, oropharyngeal mucosal pain or lesions, and the patient denies throat pain or discomfort. Lymphatics: The patient denies palpable peripheral lymphadenopathy. Hematologic: The patient denies having bruising, bleeding, or progressive fatigue.  
Respiratory: Patient denies having shortness of breath, cough, sputum production, fever, or dyspnea on exertion. Cardiovascular: The patient denies having leg pain, leg swelling, heart palpitations, chest permit, chest pain, or lightheadedness. The patient denies having dyspnea on exertion. Gastrointestinal: The patient denies having nausea, emesis, or diarrhea. The patient denies having any hematemesis or blood in the stool. Genitourinary: Patient denies having urinary urgency, frequency, or dysuria. The patient denies having blood in the urine. Psychological: The patient denies having symptoms of nervousness, anxiety, depression, or thoughts of harming self. Skin: Patient denies having skin rashes, skin, ulcerations, or unexplained itching or pruritus. Musculoskeletal: The patient denies having pain in the joints or bones. Objective:  
 
Visit Vitals /73 Pulse 67 Temp 97.9 °F (36.6 °C) (Oral) Resp 16 Ht 5' 4\" (1.626 m) Wt 110.7 kg (244 lb) SpO2 100% BMI 41.88 kg/m² ECOG PS=0, pain score=0/10 Physical Exam:  
Gen. Appearance: The patient is in no acute distress. Skin: There is no bruise or rash. HEENT: The exam is unremarkable. Neck: Supple without lymphadenopathy or thyromegaly. Lungs: Clear to auscultation and percussion; there are no wheezes or rhonchi. Heart: Regular rate and rhythm; there are no murmurs, gallops, or rubs. Chest Wall and Axilla: No palpable masses, no evidence of disease recurrence. Abdomen: Bowel sounds are present and normal.  There is no guarding, tenderness, or hepatosplenomegaly. Extremities: There is no clubbing, cyanosis, or edema. Neurologic: There are no focal neurologic deficits. Lymphatics: There is no palpable peripheral lymphadenopathy. Musculoskeletal: The patient has full range of motion at all joints. There is no evidence of joint deformity or effusions. There is no focal joint tenderness. Psychological/psychiatric: There is no clinical evidence of anxiety, depression, or melancholy. Lab data: Results for orders placed or performed during the hospital encounter of 12/07/18 CBC WITH 3 PART DIFF     Status: Abnormal  
Result Value Ref Range Status WBC 6.3 4.5 - 13.0 K/uL Final  
 RBC 5.11 (H) 4.10 - 5.10 M/uL Final  
 HGB 11.9 (L) 12.0 - 16.0 g/dL Final  
 HCT 38.7 36 - 48 % Final  
 MCV 75.7 (L) 78 - 102 FL Final  
 MCH 23.3 (L) 25.0 - 35.0 PG Final  
 MCHC 30.7 (L) 31 - 37 g/dL Final  
 RDW 13.7 11.5 - 14.5 % Final  
 PLATELET 350 195 - 701 K/uL Final  
 NEUTROPHILS 51 40 - 70 % Final  
 MIXED CELLS 10 0.1 - 17 % Final  
 LYMPHOCYTES 39 14 - 44 % Final  
 ABS. NEUTROPHILS 3.3 1.8 - 9.5 K/UL Final  
 ABS. MIXED CELLS 0.6 0.0 - 2.3 K/uL Final  
 ABS. LYMPHOCYTES 2.4 1.1 - 5.9 K/UL Final  
  Comment: Test performed at [place] [address] Location. Results Reviewed by Medical Director. DF AUTOMATED   Final  
 
 
   
Assessment:  
 
1. Invasive ductal carcinoma of right breast, stage 1 (HCC) 2. Ductal carcinoma in situ (DCIS) of breast, unspecified laterality 3. Chronic pain syndrome 4. Arthritis 5. Chemotherapy-induced peripheral neuropathy ([facility] [address] Plan: DCIS left breast/invasive ductal carcinoma right breast the patient is doing well and clinically there is no evidence of disease recurrence. I have instructed the patient to continue doing her chest wall and axilla exam on a monthly basis. The most recent CA-27-29 level from 5/8/2018 was normal at 23.9 U/mL. At this time I will recheck a CA-27-29 level. Chemotherapy-induced peripheral neuropathy: I have instructed the patient to continue taking the Neurontin 200 mg 3 times daily. She also takes Percocet 5 mg every 4-6 hours p.r.n. I will renew her Rx today. Additionally, she takes Motrin 800 mg 3 times daily with food. She will continue to take these medications as previously noted.  
 
Arthritis: I have instructed the patient to continue using her Motrin and exercises as needed for arthritis. She also takes the Percocet 5 mg every 4-6 hours as well and this offers some benefit. I plan to see the patient in 3 months or sooner if indicated. Orders Placed This Encounter  COMPLETE CBC & AUTO DIFF WBC  InHouse CBC ([employer]) Standing Status:   Future Number of Occurrences:   1 Standing Expiration Date:   12/14/2018  METABOLIC PANEL, COMPREHENSIVE Standing Status:   Future Number of Occurrences:   1 Standing Expiration Date:   12/8/2019  CA 27.29 Standing Status:   Future Number of Occurrences:   1 Standing Expiration Date:   12/8/2019  
 oxyCODONE-acetaminophen (PERCOCET) 5-325 mg per tablet Sig: Take 1 Tab by mouth every six (6) hours as needed for Pain. Max Daily Amount: 4 Tabs. Dispense:  60 Tab Refill:  0 Freida Dubon MD 
12/7/2018

## 2018-12-08 LAB
A-G RATIO,AGRAT: 1.2 RATIO (ref 1.1–2.6)
ALBUMIN SERPL-MCNC: 4.2 G/DL (ref 3.5–5)
ALP SERPL-CCNC: 86 U/L (ref 25–115)
ALT SERPL-CCNC: 14 U/L (ref 5–40)
ANION GAP SERPL CALC-SCNC: 17 MMOL/L
AST SERPL W P-5'-P-CCNC: 14 U/L (ref 10–37)
BILIRUB SERPL-MCNC: 0.4 MG/DL (ref 0.2–1.2)
BUN SERPL-MCNC: 20 MG/DL (ref 6–22)
CALCIUM SERPL-MCNC: 9.3 MG/DL (ref 8.4–10.5)
CHLORIDE SERPL-SCNC: 100 MMOL/L (ref 98–110)
CO2 SERPL-SCNC: 25 MMOL/L (ref 20–32)
CREAT SERPL-MCNC: 0.7 MG/DL (ref 0.5–1.2)
GFRAA, 66117: >60
GFRNA, 66118: >60
GLOBULIN,GLOB: 3.4 G/DL (ref 2–4)
GLUCOSE SERPL-MCNC: 90 MG/DL (ref 70–99)
POTASSIUM SERPL-SCNC: 3.8 MMOL/L (ref 3.5–5.5)
PROT SERPL-MCNC: 7.6 G/DL (ref 6.4–8.3)
SODIUM SERPL-SCNC: 142 MMOL/L (ref 133–145)

## 2018-12-11 LAB — CA 27.29, 142134: 23.4 U/ML

## 2019-03-08 ENCOUNTER — OFFICE VISIT (OUTPATIENT)
Dept: ONCOLOGY | Age: 59
End: 2019-03-08

## 2019-03-08 ENCOUNTER — HOSPITAL ENCOUNTER (OUTPATIENT)
Dept: ONCOLOGY | Age: 59
Discharge: HOME | End: 2019-03-08

## 2019-03-08 VITALS
SYSTOLIC BLOOD PRESSURE: 120 MMHG | HEIGHT: 64 IN | OXYGEN SATURATION: 94 % | WEIGHT: 244.8 LBS | BODY MASS INDEX: 41.79 KG/M2 | HEART RATE: 58 BPM | DIASTOLIC BLOOD PRESSURE: 74 MMHG | TEMPERATURE: 98 F

## 2019-03-08 DIAGNOSIS — G62.0 CHEMOTHERAPY-INDUCED PERIPHERAL NEUROPATHY (HCC): ICD-10-CM

## 2019-03-08 DIAGNOSIS — T45.1X5A CHEMOTHERAPY-INDUCED PERIPHERAL NEUROPATHY (HCC): ICD-10-CM

## 2019-03-08 DIAGNOSIS — M19.90 ARTHRITIS: ICD-10-CM

## 2019-03-08 DIAGNOSIS — C50.911 INVASIVE DUCTAL CARCINOMA OF RIGHT BREAST, STAGE 1 (HCC): ICD-10-CM

## 2019-03-08 DIAGNOSIS — D05.12 DUCTAL CARCINOMA IN SITU (DCIS) OF LEFT BREAST: ICD-10-CM

## 2019-03-08 DIAGNOSIS — G89.4 CHRONIC PAIN SYNDROME: ICD-10-CM

## 2019-03-08 DIAGNOSIS — C50.911 INVASIVE DUCTAL CARCINOMA OF RIGHT BREAST, STAGE 1 (HCC): Primary | ICD-10-CM

## 2019-03-08 LAB
BASO+EOS+MONOS # BLD AUTO: 0.4 K/UL (ref 0–2.3)
BASO+EOS+MONOS NFR BLD AUTO: 7 % (ref 0.1–17)
DIFFERENTIAL METHOD BLD: ABNORMAL
ERYTHROCYTE [DISTWIDTH] IN BLOOD BY AUTOMATED COUNT: 13.6 % (ref 11.5–14.5)
HCT VFR BLD AUTO: 39.7 % (ref 36–48)
HGB BLD-MCNC: 12.2 G/DL (ref 12–16)
LYMPHOCYTES # BLD: 2.2 K/UL (ref 1.1–5.9)
LYMPHOCYTES NFR BLD: 37 % (ref 14–44)
MCH RBC QN AUTO: 23.7 PG (ref 25–35)
MCHC RBC AUTO-ENTMCNC: 30.7 G/DL (ref 31–37)
MCV RBC AUTO: 77.1 FL (ref 78–102)
NEUTS SEG # BLD: 3.2 K/UL (ref 1.8–9.5)
NEUTS SEG NFR BLD: 56 % (ref 40–70)
PLATELET # BLD AUTO: 239 K/UL (ref 140–440)
RBC # BLD AUTO: 5.15 M/UL (ref 4.1–5.1)
WBC # BLD AUTO: 5.8 K/UL (ref 4.5–13)

## 2019-03-08 RX ORDER — OXYCODONE AND ACETAMINOPHEN 5; 325 MG/1; MG/1
1 TABLET ORAL
Qty: 60 TAB | Refills: 0 | Status: SHIPPED | OUTPATIENT
Start: 2019-03-08 | End: 2019-04-07

## 2019-03-08 NOTE — PROGRESS NOTES
Hematology/Oncology  Progress Note    Name: Tammy Both  Date: 3/8/2019  : 1960    PCP: Molly Hare MD     Ms. Cleveland Green is a 61 y.o.  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  Current therapy: The patient completed adjuvant systemic chemotherapy      Subjective:     Ms. Cleveland Green is a 63-year-old [facility] American woman who has a history of DCIS involving the left breast and invasive ductal adenocarcinoma of the right breast. The patient has done well since she completed her systemic chemotherapy. She also underwent bilateral mastectomies. She decided not to proceed with reconstruction. She is requesting for a new prescription of her percocet. She has no physical complaints at this time. Past medical history, family history, and social history: these were reviewed and remains unchanged.     Past Medical History:   Diagnosis Date    Cancer [facility])     right breast cancer     DDD (degenerative disc disease), cervical 2017    Hypercholesterolemia     Hypertension      Past Surgical History:   Procedure Laterality Date    HX BREAST RECONSTRUCTION  2014    BILATERAL BREAST RECONSTRUCTION WITH TISSUE EXPANDERS performed by Julia Garibay MD at [facility] MAIN OR     [address]      HX CHOLECYSTECTOMY      HX GYN      fibroid tumors removed    HX MASTECTOMY  2014    BILATERAL BREAST MASTECTOMY SIMPLE performed by Ledy Vasquez MD at [facility] MAIN OR    HX TUBAL LIGATION      HX VASCULAR ACCESS      left chest     Social History     Socioeconomic History    Marital status:      Spouse name: Not on file    Number of children: Not on file    Years of education: Not on file    Highest education level: Not on file   Social Needs    Financial resource strain: Not on file    Food insecurity - worry: Not on file    Food insecurity - inability: Not on file    Transportation needs - medical: Not on file  Transportation needs - non-medical: Not on file   Occupational History    Not on file   Tobacco Use    Smoking status: Former Smoker     Types: Cigarettes    Smokeless tobacco: Never Used    Tobacco comment: quit 6/2014   Substance and Sexual Activity    Alcohol use: No     Comment: Socially    Drug use: No    Sexual activity: Not Currently   Other Topics Concern    Not on file   Social History Narrative    Not on file     Family History   Problem Relation Age of Onset    Hypertension Mother     Diabetes Father     Diabetes Daughter     Diabetes Son      Current Outpatient Medications   Medication Sig Dispense Refill    oxyCODONE-acetaminophen (PERCOCET) 5-325 mg per tablet Take 1 Tab by mouth every six (6) hours as needed for Pain for up to 30 days. Max Daily Amount: 4 Tabs. 60 Tab 0    oxyCODONE-acetaminophen (PERCOCET) 5-325 mg per tablet Take 1 Tab by mouth every six (6) hours as needed for Pain. Max Daily Amount: 4 Tabs. 60 Tab 0    gabapentin (NEURONTIN) 100 mg capsule Take 2 Caps by mouth three (3) times daily. 180 Cap 11    meloxicam (MOBIC) 15 mg tablet Take 1 Tab by mouth daily. 30 Tab 0    lovastatin (MEVACOR) 20 mg tablet Take 1 Tab by mouth daily. 90 Tab 1    triamterene-hydroCHLOROthiazide (DYAZIDE) 37.5-25 mg per capsule Take 1 Cap by mouth daily. 90 Cap 1    ferrous sulfate (IRON) 325 mg (65 mg iron) tablet Take 325 mg by mouth Daily (before breakfast). Indications: IRON DEFICIENCY ANEMIA         Review of Systems  Constitutional: The patient has no acute distress or discomfort. HEENT: The patient denies recent head trauma, eye pain, blurred vision,  hearing deficit, oropharyngeal mucosal pain or lesions, and the patient denies throat pain or discomfort. Lymphatics: The patient denies palpable peripheral lymphadenopathy. Hematologic: The patient denies having bruising, bleeding, or progressive fatigue.   Respiratory: Patient denies having shortness of breath, cough, sputum production, fever, or dyspnea on exertion. Cardiovascular: The patient denies having leg pain, leg swelling, heart palpitations, chest permit, chest pain, or lightheadedness. The patient denies having dyspnea on exertion. Gastrointestinal: The patient denies having nausea, emesis, or diarrhea. The patient denies having any hematemesis or blood in the stool. Genitourinary: Patient denies having urinary urgency, frequency, or dysuria. The patient denies having blood in the urine. Psychological: The patient denies having symptoms of nervousness, anxiety, depression, or thoughts of harming self. Skin: Patient denies having skin rashes, skin, ulcerations, or unexplained itching or pruritus. Musculoskeletal: The patient denies having pain in the joints or bones. Objective:     Visit Vitals  /74   Pulse (!) 58   Temp 98 °F (36.7 °C)   Ht 5' 4\" (1.626 m)   Wt 111 kg (244 lb 12.8 oz)   SpO2 94%   BMI 42.02 kg/m²     ECOG PS=0, pain score=0/10   Physical Exam:   Gen. Appearance: The patient is in no acute distress. Skin: There is no bruise or rash. HEENT: The exam is unremarkable. Neck: Supple without lymphadenopathy or thyromegaly. Lungs: Clear to auscultation and percussion; there are no wheezes or rhonchi. Heart: Regular rate and rhythm; there are no murmurs, gallops, or rubs. Chest Wall and Axilla: No palpable masses, no evidence of disease recurrence. Abdomen: Bowel sounds are present and normal.  There is no guarding, tenderness, or hepatosplenomegaly. Extremities: There is no clubbing, cyanosis, or edema. Neurologic: There are no focal neurologic deficits. Lymphatics: There is no palpable peripheral lymphadenopathy. Musculoskeletal: The patient has full range of motion at all joints. There is no evidence of joint deformity or effusions. There is no focal joint tenderness. Psychological/psychiatric: There is no clinical evidence of anxiety, depression, or melancholy.     Lab data: Results for orders placed or performed during the hospital encounter of 03/08/19   CBC WITH 3 PART DIFF     Status: Abnormal   Result Value Ref Range Status    WBC 5.8 4.5 - 13.0 K/uL Final    RBC 5.15 (H) 4.10 - 5.10 M/uL Final    HGB 12.2 12.0 - 16.0 g/dL Final    HCT 39.7 36 - 48 % Final    MCV 77.1 (L) 78 - 102 FL Final    MCH 23.7 (L) 25.0 - 35.0 PG Final    MCHC 30.7 (L) 31 - 37 g/dL Final    RDW 13.6 11.5 - 14.5 % Final    PLATELET 822 487 - 704 K/uL Final    NEUTROPHILS 56 40 - 70 % Final    MIXED CELLS 7 0.1 - 17 % Final    LYMPHOCYTES 37 14 - 44 % Final    ABS. NEUTROPHILS 3.2 1.8 - 9.5 K/UL Final    ABS. MIXED CELLS 0.4 0.0 - 2.3 K/uL Final    ABS. LYMPHOCYTES 2.2 1.1 - 5.9 K/UL Final     Comment: Test performed at [address] [place] [address] or [facility]patient [facility] Location. Reviewed by Medical Director. DF AUTOMATED   Final           Assessment:     1. Invasive ductal carcinoma of right breast, stage 1 (HCC)    2. Ductal carcinoma in situ (DCIS) of left breast    3. Chronic pain syndrome    4. Arthritis    5. Chemotherapy-induced peripheral neuropathy (HCC)      Plan:     DCIS left breast/invasive ductal carcinoma right breast the patient is doing well and clinically there is no evidence of disease recurrence. I have instructed the patient to continue doing her chest wall and axilla exam on a monthly basis. The most recent CA-27-29 level from 12/11/2018 was normal at 23.4 u/mL. At this time I will recheck a CA-27-29 level. Chemotherapy-induced peripheral neuropathy: I have instructed the patient to continue taking the Neurontin 200 mg 3 times daily. She also takes Percocet 5 mg every 4-6 hours p.r.n. I will renew her Rx today. Additionally, she takes Motrin 800 mg 3 times daily with food. She will continue to take these medications as previously noted. Arthritis: I have instructed the patient to continue using her Motrin and exercises as needed for arthritis. She also takes the Percocet 5 mg every 4-6 hours as well and this offers some benefit. I plan to see the patient in 4 months or sooner if indicated. Orders Placed This Encounter    COMPLETE CBC & AUTO DIFF WBC    InHouse CBC ([employer])     Standing Status:   Future     Number of Occurrences:   1     Standing Expiration Date:   8/00/5509    METABOLIC PANEL, COMPREHENSIVE     Standing Status:   Future     Number of Occurrences:   1     Standing Expiration Date:   3/8/2020    CA 27.29     Standing Status:   Future     Number of Occurrences:   1     Standing Expiration Date:   3/8/2020    oxyCODONE-acetaminophen (PERCOCET) 5-325 mg per tablet     Sig: Take 1 Tab by mouth every six (6) hours as needed for Pain for up to 30 days. Max Daily Amount: 4 Tabs.      Dispense:  60 Tab     Refill:  0       Yumi Wiggins MD  3/8/2019

## 2019-03-08 NOTE — PATIENT INSTRUCTIONS

## 2019-03-09 LAB
ALBUMIN SERPL-MCNC: 4 G/DL (ref 3.5–5.5)
ALBUMIN/GLOB SERPL: 1 {RATIO} (ref 1.2–2.2)
ALP SERPL-CCNC: 80 IU/L (ref 39–117)
ALT SERPL-CCNC: 14 IU/L (ref 0–32)
AST SERPL-CCNC: 20 IU/L (ref 0–40)
BILIRUB SERPL-MCNC: 0.4 MG/DL (ref 0–1.2)
BUN SERPL-MCNC: 16 MG/DL (ref 6–24)
BUN/CREAT SERPL: 21 (ref 9–23)
CALCIUM SERPL-MCNC: 9.7 MG/DL (ref 8.7–10.2)
CANCER AG27-29 SERPL-ACNC: 24.4 U/ML (ref 0–38.6)
CHLORIDE SERPL-SCNC: 102 MMOL/L (ref 96–106)
CO2 SERPL-SCNC: 27 MMOL/L (ref 20–29)
CREAT SERPL-MCNC: 0.76 MG/DL (ref 0.57–1)
GLOBULIN SER CALC-MCNC: 3.9 G/DL (ref 1.5–4.5)
GLUCOSE SERPL-MCNC: 86 MG/DL (ref 65–99)
POTASSIUM SERPL-SCNC: 3.9 MMOL/L (ref 3.5–5.2)
PROT SERPL-MCNC: 7.9 G/DL (ref 6–8.5)
SODIUM SERPL-SCNC: 142 MMOL/L (ref 134–144)

## 2019-03-13 ENCOUNTER — TELEPHONE (OUTPATIENT)
Dept: ONCOLOGY | Age: 59
End: 2019-03-13

## 2019-05-17 DIAGNOSIS — T45.1X5A CHEMOTHERAPY-INDUCED PERIPHERAL NEUROPATHY (HCC): Primary | ICD-10-CM

## 2019-05-17 DIAGNOSIS — G62.0 CHEMOTHERAPY-INDUCED PERIPHERAL NEUROPATHY (HCC): Primary | ICD-10-CM

## 2019-05-17 RX ORDER — GABAPENTIN 100 MG/1
200 CAPSULE ORAL 3 TIMES DAILY
Qty: 180 CAP | Refills: 11 | Status: SHIPPED | OUTPATIENT
Start: 2019-05-17 | End: 2019-12-10 | Stop reason: SDUPTHER

## 2019-05-17 RX ORDER — GABAPENTIN 100 MG/1
200 CAPSULE ORAL 3 TIMES DAILY
Qty: 180 CAP | Refills: 11 | Status: CANCELLED | OUTPATIENT
Start: 2019-05-17

## 2019-07-12 ENCOUNTER — HOSPITAL ENCOUNTER (OUTPATIENT)
Dept: ONCOLOGY | Age: 59
Discharge: HOME | End: 2019-07-12

## 2019-07-12 ENCOUNTER — OFFICE VISIT (OUTPATIENT)
Dept: ONCOLOGY | Age: 59
End: 2019-07-12

## 2019-07-12 VITALS
TEMPERATURE: 98.4 F | DIASTOLIC BLOOD PRESSURE: 74 MMHG | BODY MASS INDEX: 40.97 KG/M2 | HEART RATE: 61 BPM | WEIGHT: 240 LBS | OXYGEN SATURATION: 98 % | RESPIRATION RATE: 18 BRPM | HEIGHT: 64 IN | SYSTOLIC BLOOD PRESSURE: 126 MMHG

## 2019-07-12 DIAGNOSIS — T45.1X5A CHEMOTHERAPY-INDUCED PERIPHERAL NEUROPATHY (HCC): ICD-10-CM

## 2019-07-12 DIAGNOSIS — C50.911 INVASIVE DUCTAL CARCINOMA OF RIGHT BREAST, STAGE 1 (HCC): ICD-10-CM

## 2019-07-12 DIAGNOSIS — C50.911 INVASIVE DUCTAL CARCINOMA OF RIGHT BREAST, STAGE 1 (HCC): Primary | ICD-10-CM

## 2019-07-12 DIAGNOSIS — G62.0 CHEMOTHERAPY-INDUCED PERIPHERAL NEUROPATHY (HCC): ICD-10-CM

## 2019-07-12 DIAGNOSIS — D64.9 CHRONIC ANEMIA: ICD-10-CM

## 2019-07-12 DIAGNOSIS — M19.90 ARTHRITIS: ICD-10-CM

## 2019-07-12 DIAGNOSIS — G89.4 CHRONIC PAIN SYNDROME: ICD-10-CM

## 2019-07-12 PROBLEM — E66.01 OBESITY, MORBID (HCC): Status: ACTIVE | Noted: 2019-07-12

## 2019-07-12 LAB
BASO+EOS+MONOS # BLD AUTO: 0.4 K/UL (ref 0–2.3)
BASO+EOS+MONOS NFR BLD AUTO: 7 % (ref 0.1–17)
DIFFERENTIAL METHOD BLD: ABNORMAL
ERYTHROCYTE [DISTWIDTH] IN BLOOD BY AUTOMATED COUNT: 13.5 % (ref 11.5–14.5)
HCT VFR BLD AUTO: 40.2 % (ref 36–48)
HGB BLD-MCNC: 12.5 G/DL (ref 12–16)
LYMPHOCYTES # BLD: 2.5 K/UL (ref 1.1–5.9)
LYMPHOCYTES NFR BLD: 40 % (ref 14–44)
MCH RBC QN AUTO: 23.9 PG (ref 25–35)
MCHC RBC AUTO-ENTMCNC: 31.1 G/DL (ref 31–37)
MCV RBC AUTO: 76.7 FL (ref 78–102)
NEUTS SEG # BLD: 3.4 K/UL (ref 1.8–9.5)
NEUTS SEG NFR BLD: 53 % (ref 40–70)
PLATELET # BLD AUTO: 264 K/UL (ref 140–440)
RBC # BLD AUTO: 5.24 M/UL (ref 4.1–5.1)
WBC # BLD AUTO: 6.3 K/UL (ref 4.5–13)

## 2019-07-12 RX ORDER — OXYCODONE AND ACETAMINOPHEN 5; 325 MG/1; MG/1
1 TABLET ORAL
Qty: 60 TAB | Refills: 0 | Status: SHIPPED | OUTPATIENT
Start: 2019-07-12 | End: 2019-12-11 | Stop reason: SDUPTHER

## 2019-07-12 NOTE — PROGRESS NOTES
Hematology/Oncology  Progress Note    Name: Cullen Rice  Date: 2019  : 1960    PCP: Moreno Molina MD     Ms. Aleksandar Medina is a 61 y.o.  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    Current therapy: The patient completed adjuvant systemic chemotherapy      Subjective:     Ms. Aleksandar Medina is a 59-year-old [facility] American woman who has a history of DCIS involving the left breast and invasive ductal adenocarcinoma of the right breast. The patient has done well since she completed her systemic chemotherapy. She also underwent bilateral mastectomies. She decided not to proceed with reconstruction. She is requesting for a new prescription of her percocet. She has no physical complaints to report at this time. Past medical history, family history, and social history: these were reviewed and remains unchanged.     Past Medical History:   Diagnosis Date    Cancer [facility])     right breast cancer     DDD (degenerative disc disease), cervical 2017    Hypercholesterolemia     Hypertension      Past Surgical History:   Procedure Laterality Date    HX BREAST RECONSTRUCTION  2014    BILATERAL BREAST RECONSTRUCTION WITH TISSUE EXPANDERS performed by Carol Lester MD at [facility] MAIN OR     [address]      HX CHOLECYSTECTOMY      HX GYN      fibroid tumors removed    HX MASTECTOMY  2014    BILATERAL BREAST MASTECTOMY SIMPLE performed by Tomy Trimble MD at [facility] MAIN OR    HX TUBAL LIGATION      HX VASCULAR ACCESS      left chest     Social History     Socioeconomic History    Marital status:      Spouse name: Not on file    Number of children: Not on file    Years of education: Not on file    Highest education level: Not on file   Occupational History    Not on file   Social Needs    Financial resource strain: Not on file    Food insecurity:     Worry: Not on file     Inability: Not on file   [facility] Transportation needs:     Medical: Not on file     Non-medical: Not on file   Tobacco Use    Smoking status: Former Smoker     Types: Cigarettes    Smokeless tobacco: Never Used    Tobacco comment: quit 6/2014   Substance and Sexual Activity    Alcohol use: No     Comment: Socially    Drug use: No    Sexual activity: Not Currently   Lifestyle    Physical activity:     Days per week: Not on file     Minutes per session: Not on file    Stress: Not on file   Relationships    Social connections:     Talks on phone: Not on file     Gets together: Not on file     Attends Jewish service: Not on file     Active member of club or organization: Not on file     Attends meetings of clubs or organizations: Not on file     Relationship status: Not on file    Intimate partner violence:     Fear of current or ex partner: Not on file     Emotionally abused: Not on file     Physically abused: Not on file     Forced sexual activity: Not on file   Other Topics Concern    Not on file   Social History Narrative    Not on file     Family History   Problem Relation Age of Onset    Hypertension Mother     Diabetes Father     Diabetes Daughter     Diabetes Son      Current Outpatient Medications   Medication Sig Dispense Refill    oxyCODONE-acetaminophen (PERCOCET) 5-325 mg per tablet Take 1 Tab by mouth every six (6) hours as needed for Pain for up to 15 days. Max Daily Amount: 4 Tabs. 60 Tab 0    gabapentin (NEURONTIN) 100 mg capsule Take 2 Caps by mouth three (3) times daily. 180 Cap 11    meloxicam (MOBIC) 15 mg tablet Take 1 Tab by mouth daily. 30 Tab 0    lovastatin (MEVACOR) 20 mg tablet Take 1 Tab by mouth daily. 90 Tab 1    triamterene-hydroCHLOROthiazide (DYAZIDE) 37.5-25 mg per capsule Take 1 Cap by mouth daily. 90 Cap 1    ferrous sulfate (IRON) 325 mg (65 mg iron) tablet Take 325 mg by mouth Daily (before breakfast).  Indications: IRON DEFICIENCY ANEMIA         Review of Systems  Constitutional: The patient has no acute distress or discomfort. HEENT: The patient denies recent head trauma, eye pain, blurred vision,  hearing deficit, oropharyngeal mucosal pain or lesions, and the patient denies throat pain or discomfort. Lymphatics: The patient denies palpable peripheral lymphadenopathy. Hematologic: The patient denies having bruising, bleeding, or progressive fatigue. Respiratory: Patient denies having shortness of breath, cough, sputum production, fever, or dyspnea on exertion. Cardiovascular: The patient denies having leg pain, leg swelling, heart palpitations, chest permit, chest pain, or lightheadedness. The patient denies having dyspnea on exertion. Gastrointestinal: The patient denies having nausea, emesis, or diarrhea. The patient denies having any hematemesis or blood in the stool. Genitourinary: Patient denies having urinary urgency, frequency, or dysuria. The patient denies having blood in the urine. Psychological: The patient denies having symptoms of nervousness, anxiety, depression, or thoughts of harming self. Skin: Patient denies having skin rashes, skin, ulcerations, or unexplained itching or pruritus. Musculoskeletal: The patient denies having pain in the joints or bones. Objective:     Visit Vitals  /74   Pulse 61   Temp 98.4 °F (36.9 °C) (Oral)   Resp 18   Ht 5' 4\" (1.626 m)   Wt 108.9 kg (240 lb)   SpO2 98%   BMI 41.20 kg/m²     ECOG PS=0, pain score=0/10   Physical Exam:   Gen. Appearance: The patient is in no acute distress. Skin: There is no bruise or rash. HEENT: The exam is unremarkable. Neck: Supple without lymphadenopathy or thyromegaly. Lungs: Clear to auscultation and percussion; there are no wheezes or rhonchi. Heart: Regular rate and rhythm; there are no murmurs, gallops, or rubs. Chest Wall and Axilla: No palpable masses, no evidence of disease recurrence. Abdomen:  Bowel sounds are present and normal.  There is no guarding, tenderness, or hepatosplenomegaly. Extremities: There is no clubbing, cyanosis, or edema. Neurologic: There are no focal neurologic deficits. Lymphatics: There is no palpable peripheral lymphadenopathy. Musculoskeletal: The patient has full range of motion at all joints. There is no evidence of joint deformity or effusions. There is no focal joint tenderness. Psychological/psychiatric: There is no clinical evidence of anxiety, depression, or melancholy. Lab data:      Results for orders placed or performed during the hospital encounter of 07/12/19   CBC WITH 3 PART DIFF     Status: Abnormal   Result Value Ref Range Status    WBC 6.3 4.5 - 13.0 K/uL Final    RBC 5.24 (H) 4.10 - 5.10 M/uL Final    HGB 12.5 12.0 - 16.0 g/dL Final    HCT 40.2 36 - 48 % Final    MCV 76.7 (L) 78 - 102 FL Final    MCH 23.9 (L) 25.0 - 35.0 PG Final    MCHC 31.1 31 - 37 g/dL Final    RDW 13.5 11.5 - 14.5 % Final    PLATELET 058 725 - 844 K/uL Final    NEUTROPHILS 53 40 - 70 % Final    MIXED CELLS 7 0.1 - 17 % Final    LYMPHOCYTES 40 14 - 44 % Final    ABS. NEUTROPHILS 3.4 1.8 - 9.5 K/UL Final    ABS. MIXED CELLS 0.4 0.0 - 2.3 K/uL Final    ABS. LYMPHOCYTES 2.5 1.1 - 5.9 K/UL Final     Comment: Test performed at [address] [place] [address] or Outpatient Infusion Center Location. Reviewed by Medical Director. DF AUTOMATED   Final           Assessment:     1. Invasive ductal carcinoma of right breast, stage 1 (HCC)    2. Chronic anemia    3. Chemotherapy-induced peripheral neuropathy ([facility] [address]    4. Arthritis      Plan:     DCIS left breast/invasive ductal carcinoma right breast the patient is doing well and clinically there is no evidence of disease recurrence. I have instructed the patient to continue doing her chest wall and axilla exam on a monthly basis. The most recent CA-27-29 level from 03/09/2019 was normal at 24.4U/mL. At this time I will recheck a CA-27-29 level.     Chronic Anemia: Today her CBC shows her hemoglobin is normal at 12.5g/dL with hematocrit of 40.2%. Based on her values it shows her anemia has been resolved. We will continue to monitor at this time. Chemotherapy-induced peripheral neuropathy: I have instructed the patient to continue taking the Neurontin 200mg PO TID. She also takes Percocet 5mg every 4-6 hours PRN for pain. I will renew her Rx today. S    Arthritis: I have instructed the patient to continue using her Mobic as prescribed by her PCP and exercises as needed for arthritis. She also takes the Percocet 5mg every 4-6 hours as well which she states is helping with her symptoms. I plan to see the patient in 3 months or sooner if indicated. Orders Placed This Encounter    COMPLETE CBC & AUTO DIFF WBC    InHouse CBC ([employer])     Standing Status:   Future     Number of Occurrences:   1     Standing Expiration Date:   7/20/4932    METABOLIC PANEL, COMPREHENSIVE     Standing Status:   Future     Standing Expiration Date:   7/12/2020    CA 27.29     Standing Status:   Future     Standing Expiration Date:   7/12/2020       Yumiko Burrell NP  7/12/2019     I have assessed the patient independently and  agree with the full assessment as outlined.   Crissy Lama MD, [address]

## 2019-07-12 NOTE — PATIENT INSTRUCTIONS
Gabapentin (By mouth) Gabapentin (franklin-a-PEN-tin) Treats seizures and pain caused by shingles. Brand Name(s): ACTIVE-PAC with Gabapentin, Convenience Khanh, Cyclo/Jaylin 10/300 Pack, DIRECTV, Jaylin-V, Gralise, Gralise Starter Pack, Neurontin, [employer] Kit There may be other brand names for this medicine. When This Medicine Should Not Be Used: This medicine is not right for everyone. Do not use it if you had an allergic reaction to gabapentin. How to Use This Medicine:  
Capsule, Liquid, Tablet · Take your medicine as directed. Your dose may need to be changed several times to find what works best for you. If you have epilepsy, do not allow more than 12 hours to pass between doses. · Capsule: Swallow the capsule whole with plenty of water. Do not open, crush, or chew it. · Gralise® tablet: Swallow the tablet whole . Do not crush, break, or chew it. · Neurontin® tablet: If you break a tablet into 2 pieces, use the second half as your next dose. If you don't use it within 28 days, throw it away. · Measure the oral liquid medicine with a marked measuring spoon, oral syringe, or medicine cup. · This medicine should come with a Medication Guide. Ask your pharmacist for a copy if you do not have one. · Missed dose: Take a dose as soon as you remember. If it is almost time for your next dose, wait until then and take a regular dose. Do not take extra medicine to make up for a missed dose. · Store the medicine in a closed container at room temperature, away from heat, moisture, and direct light. Store the Neurontin® oral liquid in the refrigerator. Do not freeze. Drugs and Foods to Avoid: Ask your doctor or pharmacist before using any other medicine, including over-the-counter medicines, vitamins, and herbal products. · Some medicines can affect how gabapentin works. Tell your doctor if you also use any of the following: ¨ Hydrocodone ¨ Morphine · If you take an antacid, wait at least 2 hours before you take gabapentin. · Tell your doctor if you use anything else that makes you sleepy. Some examples are allergy medicine, narcotic pain medicine, and alcohol. Warnings While Using This Medicine: · Tell your doctor if you are pregnant or breastfeeding, or if you have kidney problems or are receiving dialysis. Tell your doctor if you have a history of depression or mental health problems. · This medicine may increase depression or thoughts of suicide. Tell your doctor right away if you start to feel more depressed or think about hurting yourself. · This medicine may cause a serious allergic reaction called multiorgan hypersensitivity, which can damage organs and be life-threatening. · Do not stop using this medicine suddenly. Your doctor will need to slowly decrease your dose before you stop it completely. If you take this medicine to prevent seizures, your seizures may return or occur more often if you stop this medicine suddenly. · This medicine may make you dizzy or drowsy. Do not drive or do anything else that could be dangerous until you know how this medicine affects you. · Tell any doctor or dentist who treats you that you are using this medicine. This medicine may affect certain medical test results. · Your doctor will check your progress and the effects of this medicine at regular visits. Keep all appointments. · Keep all medicine out of the reach of children. Never share your medicine with anyone. Possible Side Effects While Using This Medicine:  
Call your doctor right away if you notice any of these side effects: · Allergic reaction: Itching or hives, swelling in your face or hands, swelling or tingling in your mouth or throat, chest tightness, trouble breathing · Behavior problems, aggression, restlessness, trouble concentrating, moodiness (especially in children) · Blistering, peeling, red skin rash · Change in how much or how often you urinate, bloody or cloudy urine, 
· Chest pain, fast heartbeat, trouble breathing · Dark urine or pale stools, nausea, vomiting, loss of appetite, stomach pain, yellow skin or eyes · Fever, rash, swollen or tender glands in the neck, armpit, or groin · Problems with coordination, shakiness, unsteadiness · Rapid weight gain, swelling in your hands, ankles, or feet · Unusual moods or behaviors, thoughts of hurting yourself, feeling depressed If you notice these less serious side effects, talk with your doctor: · Dizziness, drowsiness, sleepiness, tiredness If you notice other side effects that you think are caused by this medicine, tell your doctor. Call your doctor for medical advice about side effects. You may report side effects to FDA at 4-875-FDA-5243 © 2017 [facility] Information is for End User's use only and may not be sold, redistributed or otherwise used for commercial purposes. The above information is an  only. It is not intended as medical advice for individual conditions or treatments. Talk to your doctor, nurse or pharmacist before following any medical regimen to see if it is safe and effective for you.

## 2019-07-15 LAB
ALBUMIN SERPL-MCNC: 4.1 G/DL (ref 3.5–5.5)
ALBUMIN/GLOB SERPL: 1.1 {RATIO} (ref 1.2–2.2)
ALP SERPL-CCNC: 83 IU/L (ref 39–117)
ALT SERPL-CCNC: 17 IU/L (ref 0–32)
AST SERPL-CCNC: 18 IU/L (ref 0–40)
BILIRUB SERPL-MCNC: 0.4 MG/DL (ref 0–1.2)
BUN SERPL-MCNC: 14 MG/DL (ref 6–24)
BUN/CREAT SERPL: 22 (ref 9–23)
CALCIUM SERPL-MCNC: 9.4 MG/DL (ref 8.7–10.2)
CANCER AG27-29 SERPL-ACNC: 23.6 U/ML (ref 0–38.6)
CHLORIDE SERPL-SCNC: 100 MMOL/L (ref 96–106)
CO2 SERPL-SCNC: 24 MMOL/L (ref 20–29)
CREAT SERPL-MCNC: 0.65 MG/DL (ref 0.57–1)
GLOBULIN SER CALC-MCNC: 3.8 G/DL (ref 1.5–4.5)
GLUCOSE SERPL-MCNC: 91 MG/DL (ref 65–99)
POTASSIUM SERPL-SCNC: 3.6 MMOL/L (ref 3.5–5.2)
PROT SERPL-MCNC: 7.9 G/DL (ref 6–8.5)
SODIUM SERPL-SCNC: 138 MMOL/L (ref 134–144)
SPECIMEN STATUS REPORT, ROLRST: NORMAL

## 2019-07-24 ENCOUNTER — TELEPHONE (OUTPATIENT)
Dept: ONCOLOGY | Age: 59
End: 2019-07-24

## 2019-07-24 NOTE — TELEPHONE ENCOUNTER
The patient's pharmacy faxed a new prescription request for the patient's Gabapentin, since it is a controlled substance they will need in paper form and hand carried by patient.

## 2019-10-15 ENCOUNTER — OFFICE VISIT (OUTPATIENT)
Dept: ONCOLOGY | Age: 59
End: 2019-10-15

## 2019-10-15 VITALS
SYSTOLIC BLOOD PRESSURE: 131 MMHG | HEIGHT: 64 IN | WEIGHT: 244 LBS | DIASTOLIC BLOOD PRESSURE: 76 MMHG | OXYGEN SATURATION: 99 % | RESPIRATION RATE: 16 BRPM | TEMPERATURE: 98.4 F | HEART RATE: 58 BPM | BODY MASS INDEX: 41.66 KG/M2

## 2019-10-15 DIAGNOSIS — D05.12 DUCTAL CARCINOMA IN SITU (DCIS) OF LEFT BREAST: Primary | ICD-10-CM

## 2019-10-15 DIAGNOSIS — T45.1X5A CHEMOTHERAPY-INDUCED PERIPHERAL NEUROPATHY (HCC): ICD-10-CM

## 2019-10-15 DIAGNOSIS — M19.90 ARTHRITIS: ICD-10-CM

## 2019-10-15 DIAGNOSIS — D64.9 CHRONIC ANEMIA: ICD-10-CM

## 2019-10-15 DIAGNOSIS — G89.4 CHRONIC PAIN SYNDROME: Primary | ICD-10-CM

## 2019-10-15 DIAGNOSIS — G62.0 CHEMOTHERAPY-INDUCED PERIPHERAL NEUROPATHY (HCC): ICD-10-CM

## 2019-10-15 DIAGNOSIS — C50.911 INVASIVE DUCTAL CARCINOMA OF RIGHT BREAST, STAGE 1 (HCC): ICD-10-CM

## 2019-10-15 NOTE — PROGRESS NOTES
Hematology/Oncology  Progress Note    Name: Chandni Whaley  Date: 10/15/2019  : 1960    PCP: Anastasia Linder MD     Ms. Marianna Saenz is a 61 y.o.  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    Current therapy: The patient completed adjuvant systemic chemotherapy      Subjective:     Ms. Marianna Saenz is a 40-year-old [facility] [place] woman who has a history of DCIS involving the left breast and invasive ductal adenocarcinoma of the right breast. She was diagnosed more than 5 years ago. The patient reports she has done well since she completed her systemic chemotherapy. She also underwent bilateral mastectomies. She decided not to proceed with reconstruction. She is requesting for a new prescription of her percocet. She has no physical complaints to report at this time. Past medical history, family history, and social history: these were reviewed and remains unchanged.     Past Medical History:   Diagnosis Date    Cancer [facility])     right breast cancer     DDD (degenerative disc disease), cervical 2017    Hypercholesterolemia     Hypertension      Past Surgical History:   Procedure Laterality Date    HX BREAST RECONSTRUCTION  2014    BILATERAL BREAST RECONSTRUCTION WITH TISSUE EXPANDERS performed by Chalino Cohen MD at [facility] OR     [address]      HX CHOLECYSTECTOMY      HX GYN      fibroid tumors removed    HX MASTECTOMY  2014    BILATERAL BREAST MASTECTOMY SIMPLE performed by Nnamdi Bradley MD at [facility] OR    HX TUBAL LIGATION      HX VASCULAR ACCESS      left chest     Social History     Socioeconomic History    Marital status:      Spouse name: Not on file    Number of children: Not on file    Years of education: Not on file    Highest education level: Not on file   Occupational History    Not on file   Social Needs    Financial resource strain: Not on file    Food insecurity: Worry: Not on file     Inability: Not on file    Transportation needs:     Medical: Not on file     Non-medical: Not on file   Tobacco Use    Smoking status: Former Smoker     Types: Cigarettes    Smokeless tobacco: Never Used    Tobacco comment: quit 6/2014   Substance and Sexual Activity    Alcohol use: No     Comment: Socially    Drug use: No    Sexual activity: Not Currently   Lifestyle    Physical activity:     Days per week: Not on file     Minutes per session: Not on file    Stress: Not on file   Relationships    Social connections:     Talks on phone: Not on file     Gets together: Not on file     Attends Hindu service: Not on file     Active member of club or organization: Not on file     Attends meetings of clubs or organizations: Not on file     Relationship status: Not on file    Intimate partner violence:     Fear of current or ex partner: Not on file     Emotionally abused: Not on file     Physically abused: Not on file     Forced sexual activity: Not on file   Other Topics Concern    Not on file   Social History Narrative    Not on file     Family History   Problem Relation Age of Onset    Hypertension Mother     Diabetes Father     Diabetes Daughter     Diabetes Son      Current Outpatient Medications   Medication Sig Dispense Refill    gabapentin (NEURONTIN) 100 mg capsule Take 2 Caps by mouth three (3) times daily. 180 Cap 11    meloxicam (MOBIC) 15 mg tablet Take 1 Tab by mouth daily. 30 Tab 0    lovastatin (MEVACOR) 20 mg tablet Take 1 Tab by mouth daily. 90 Tab 1    triamterene-hydroCHLOROthiazide (DYAZIDE) 37.5-25 mg per capsule Take 1 Cap by mouth daily. 90 Cap 1    ferrous sulfate (IRON) 325 mg (65 mg iron) tablet Take 325 mg by mouth Daily (before breakfast). Indications: IRON DEFICIENCY ANEMIA         Review of Systems  Constitutional: The patient has no acute distress or discomfort.   HEENT: The patient denies recent head trauma, eye pain, blurred vision,  hearing deficit, oropharyngeal mucosal pain or lesions, and the patient denies throat pain or discomfort. Lymphatics: The patient denies palpable peripheral lymphadenopathy. Hematologic: The patient denies having bruising, bleeding, or progressive fatigue. Respiratory: Patient denies having shortness of breath, cough, sputum production, fever, or dyspnea on exertion. Cardiovascular: The patient denies having leg pain, leg swelling, heart palpitations, chest permit, chest pain, or lightheadedness. The patient denies having dyspnea on exertion. Gastrointestinal: The patient denies having nausea, emesis, or diarrhea. The patient denies having any hematemesis or blood in the stool. Genitourinary: Patient denies having urinary urgency, frequency, or dysuria. The patient denies having blood in the urine. Psychological: The patient denies having symptoms of nervousness, anxiety, depression, or thoughts of harming self. Skin: Patient denies having skin rashes, skin, ulcerations, or unexplained itching or pruritus. Musculoskeletal: The patient denies having pain in the joints or bones. Objective:     Visit Vitals  /76   Pulse (!) 58   Temp 98.4 °F (36.9 °C) (Oral)   Resp 16   Ht 5' 4\" (1.626 m)   Wt 110.7 kg (244 lb)   SpO2 99%   BMI 41.88 kg/m²     ECOG PS=0, pain score=0/10   Physical Exam:   Gen. Appearance: The patient is in no acute distress. Skin: There is no bruise or rash. HEENT: The exam is unremarkable. Neck: Supple without lymphadenopathy or thyromegaly. Lungs: Clear to auscultation and percussion; there are no wheezes or rhonchi. Heart: Regular rate and rhythm; there are no murmurs, gallops, or rubs. Chest Wall and Axilla: No palpable masses, no evidence of disease recurrence. Abdomen: Bowel sounds are present and normal.  There is no guarding, tenderness, or hepatosplenomegaly. Extremities: There is no clubbing, cyanosis, or edema. Neurologic: There are no focal neurologic deficits. Lymphatics: There is no palpable peripheral lymphadenopathy. Musculoskeletal: The patient has full range of motion at all joints. There is no evidence of joint deformity or effusions. There is no focal joint tenderness. Psychological/psychiatric: There is no clinical evidence of anxiety, depression, or melancholy. Lab data:      Results for orders placed or performed during the hospital encounter of 07/12/19   CBC WITH 3 PART DIFF     Status: Abnormal   Result Value Ref Range Status    WBC 6.3 4.5 - 13.0 K/uL Final    RBC 5.24 (H) 4.10 - 5.10 M/uL Final    HGB 12.5 12.0 - 16.0 g/dL Final    HCT 40.2 36 - 48 % Final    MCV 76.7 (L) 78 - 102 FL Final    MCH 23.9 (L) 25.0 - 35.0 PG Final    MCHC 31.1 31 - 37 g/dL Final    RDW 13.5 11.5 - 14.5 % Final    PLATELET 969 567 - 312 K/uL Final    NEUTROPHILS 53 40 - 70 % Final    MIXED CELLS 7 0.1 - 17 % Final    LYMPHOCYTES 40 14 - 44 % Final    ABS. NEUTROPHILS 3.4 1.8 - 9.5 K/UL Final    ABS. MIXED CELLS 0.4 0.0 - 2.3 K/uL Final    ABS. LYMPHOCYTES 2.5 1.1 - 5.9 K/UL Final     Comment: Test performed at [address] [place] [address] or Outpatient Infusion Center Location. Reviewed by Medical Director. DF AUTOMATED   Final           Assessment:     1. Ductal carcinoma in situ (DCIS) of left breast    2. Invasive ductal carcinoma of right breast, stage 1 (HCC)    3. Chronic anemia    4. Chemotherapy-induced peripheral neuropathy ([facility] [address]    5. Arthritis      Plan:     DCIS left breast/invasive ductal carcinoma right breast the patient is doing well and clinically there is no evidence of disease recurrence. I have instructed the patient to continue doing her chest wall and axilla exam on a monthly basis. The most recent CA-27-29 level from 07/2019 was normal at 23.6U/mL. At this time I will recheck her CA-27-29 level. Chronic Anemia: Her most recent CBC showed her hemoglobin was normal at 12.5g/dL with hematocrit of 40.2%.  Based on her values it shows her anemia has been resolved. We will continue to monitor at this time. Iron Profile and Ferritin will be obtained. Chemotherapy-induced peripheral neuropathy: I have instructed the patient to continue taking the Neurontin 200mg PO TID. She also takes Percocet 5mg every 4-6 hours PRN for pain. I will renew her Rx today. Arthritis: I have instructed the patient to continue using her Mobic as prescribed by her PCP and to do her exercises as needed for arthritis. She also takes the Percocet 5mg every 4-6 hours as well which she states is helping with her symptoms. I plan to see the patient in 3 months or sooner if indicated. Orders Placed This Encounter    CBC WITH AUTOMATED DIFF     Standing Status:   Future     Standing Expiration Date:   10/15/2020    IRON PROFILE     Standing Status:   Future     Standing Expiration Date:   17/97/1400    METABOLIC PANEL, COMPREHENSIVE     Standing Status:   Future     Standing Expiration Date:   10/15/2020    FERRITIN     Standing Status:   Future     Standing Expiration Date:   10/15/2020    CA 27.29     Standing Status:   Future     Standing Expiration Date:   10/15/2020       Cary Ochoa NP  10/15/2019     I have assessed the patient independently and  agree with the full assessment as outlined.   Joanne Gerardo MD, Rafia Felix

## 2019-10-15 NOTE — PATIENT INSTRUCTIONS
Gabapentin (By mouth)   Gabapentin (franklin-a-PEN-tin)  Treats seizures and pain caused by shingles. Brand Name(s): ACTIVE-PAC with Gabapentin, Convenience Khanh, Cyclo/Jaylin 10/300 Pack, FusePaq Fanatrex, Jaylin-V, Gralise, [address] Pack, Neurontin, SmartRx Jaylin Kit   There may be other brand names for this medicine. When This Medicine Should Not Be Used: This medicine is not right for everyone. Do not use it if you had an allergic reaction to gabapentin. How to Use This Medicine:   Capsule, Liquid, Tablet  · Take your medicine as directed. Your dose may need to be changed several times to find what works best for you. If you have epilepsy, do not allow more than 12 hours to pass between doses. · Capsule: Swallow the capsule whole with plenty of water. Do not open, crush, or chew it. · Gralise® tablet: Swallow the tablet whole . Do not crush, break, or chew it. · Neurontin® tablet: If you break a tablet into 2 pieces, use the second half as your next dose. If you don't use it within 28 days, throw it away. · Measure the oral liquid medicine with a marked measuring spoon, oral syringe, or medicine cup. · This medicine should come with a Medication Guide. Ask your pharmacist for a copy if you do not have one. · Missed dose: Take a dose as soon as you remember. If it is almost time for your next dose, wait until then and take a regular dose. Do not take extra medicine to make up for a missed dose. · Store the medicine in a closed container at room temperature, away from heat, moisture, and direct light. Store the Neurontin® oral liquid in the refrigerator. Do not freeze. Drugs and Foods to Avoid:   Ask your doctor or pharmacist before using any other medicine, including over-the-counter medicines, vitamins, and herbal products. · Some medicines can affect how gabapentin works.  Tell your doctor if you also use any of the following:   ¨ Hydrocodone  ¨ Morphine  · If you take an antacid, wait at least 2 hours before you take gabapentin. · Tell your doctor if you use anything else that makes you sleepy. Some examples are allergy medicine, narcotic pain medicine, and alcohol. Warnings While Using This Medicine:   · Tell your doctor if you are pregnant or breastfeeding, or if you have kidney problems or are receiving dialysis. Tell your doctor if you have a history of depression or mental health problems. · This medicine may increase depression or thoughts of suicide. Tell your doctor right away if you start to feel more depressed or think about hurting yourself. · This medicine may cause a serious allergic reaction called multiorgan hypersensitivity, which can damage organs and be life-threatening. · Do not stop using this medicine suddenly. Your doctor will need to slowly decrease your dose before you stop it completely. If you take this medicine to prevent seizures, your seizures may return or occur more often if you stop this medicine suddenly. · This medicine may make you dizzy or drowsy. Do not drive or do anything else that could be dangerous until you know how this medicine affects you. · Tell any doctor or dentist who treats you that you are using this medicine. This medicine may affect certain medical test results. · Your doctor will check your progress and the effects of this medicine at regular visits. Keep all appointments. · Keep all medicine out of the reach of children. Never share your medicine with anyone.   Possible Side Effects While Using This Medicine:   Call your doctor right away if you notice any of these side effects:  · Allergic reaction: Itching or hives, swelling in your face or hands, swelling or tingling in your mouth or throat, chest tightness, trouble breathing  · Behavior problems, aggression, restlessness, trouble concentrating, moodiness (especially in children)  · Blistering, peeling, red skin rash  · Change in how much or how often you urinate, bloody or cloudy urine,  · Chest pain, fast heartbeat, trouble breathing  · Dark urine or pale stools, nausea, vomiting, loss of appetite, stomach pain, yellow skin or eyes  · Fever, rash, swollen or tender glands in the neck, armpit, or groin  · Problems with coordination, shakiness, unsteadiness  · Rapid weight gain, swelling in your hands, ankles, or feet  · Unusual moods or behaviors, thoughts of hurting yourself, feeling depressed  If you notice these less serious side effects, talk with your doctor:   · Dizziness, drowsiness, sleepiness, tiredness  If you notice other side effects that you think are caused by this medicine, tell your doctor. Call your doctor for medical advice about side effects. You may report side effects to FDA at 8-481-FDA-4528  © 2017 [employer] Information is for End User's use only and may not be sold, redistributed or otherwise used for commercial purposes. The above information is an  only. It is not intended as medical advice for individual conditions or treatments. Talk to your doctor, nurse or pharmacist before following any medical regimen to see if it is safe and effective for you.

## 2019-10-16 LAB
ALBUMIN SERPL-MCNC: 4 G/DL (ref 3.5–5.5)
ALBUMIN/GLOB SERPL: 1.1 {RATIO} (ref 1.2–2.2)
ALP SERPL-CCNC: 78 IU/L (ref 39–117)
ALT SERPL-CCNC: 13 IU/L (ref 0–32)
AST SERPL-CCNC: 17 IU/L (ref 0–40)
BASOPHILS # BLD AUTO: 0 X10E3/UL (ref 0–0.2)
BASOPHILS NFR BLD AUTO: 0 %
BILIRUB SERPL-MCNC: 0.3 MG/DL (ref 0–1.2)
BUN SERPL-MCNC: 14 MG/DL (ref 6–24)
BUN/CREAT SERPL: 21 (ref 9–23)
CALCIUM SERPL-MCNC: 9.2 MG/DL (ref 8.7–10.2)
CANCER AG27-29 SERPL-ACNC: 25.8 U/ML (ref 0–38.6)
CHLORIDE SERPL-SCNC: 101 MMOL/L (ref 96–106)
CO2 SERPL-SCNC: 24 MMOL/L (ref 20–29)
CREAT SERPL-MCNC: 0.66 MG/DL (ref 0.57–1)
EOSINOPHIL # BLD AUTO: 0.3 X10E3/UL (ref 0–0.4)
EOSINOPHIL NFR BLD AUTO: 6 %
ERYTHROCYTE [DISTWIDTH] IN BLOOD BY AUTOMATED COUNT: 13.6 % (ref 12.3–15.4)
FERRITIN SERPL-MCNC: 635 NG/ML (ref 15–150)
GLOBULIN SER CALC-MCNC: 3.7 G/DL (ref 1.5–4.5)
GLUCOSE SERPL-MCNC: 89 MG/DL (ref 65–99)
HCT VFR BLD AUTO: 40.5 % (ref 34–46.6)
HGB BLD-MCNC: 12.5 G/DL (ref 11.1–15.9)
IMM GRANULOCYTES # BLD AUTO: 0 X10E3/UL (ref 0–0.1)
IMM GRANULOCYTES NFR BLD AUTO: 0 %
IRON SATN MFR SERPL: 46 % (ref 15–55)
IRON SERPL-MCNC: 92 UG/DL (ref 27–159)
LYMPHOCYTES # BLD AUTO: 2.4 X10E3/UL (ref 0.7–3.1)
LYMPHOCYTES NFR BLD AUTO: 42 %
MCH RBC QN AUTO: 23.5 PG (ref 26.6–33)
MCHC RBC AUTO-ENTMCNC: 30.9 G/DL (ref 31.5–35.7)
MCV RBC AUTO: 76 FL (ref 79–97)
MONOCYTES # BLD AUTO: 0.4 X10E3/UL (ref 0.1–0.9)
MONOCYTES NFR BLD AUTO: 7 %
NEUTROPHILS # BLD AUTO: 2.5 X10E3/UL (ref 1.4–7)
NEUTROPHILS NFR BLD AUTO: 45 %
PLATELET # BLD AUTO: 281 X10E3/UL (ref 150–450)
POTASSIUM SERPL-SCNC: 3.8 MMOL/L (ref 3.5–5.2)
PROT SERPL-MCNC: 7.7 G/DL (ref 6–8.5)
RBC # BLD AUTO: 5.31 X10E6/UL (ref 3.77–5.28)
SODIUM SERPL-SCNC: 141 MMOL/L (ref 134–144)
TIBC SERPL-MCNC: 202 UG/DL (ref 250–450)
UIBC SERPL-MCNC: 110 UG/DL (ref 131–425)
WBC # BLD AUTO: 5.7 X10E3/UL (ref 3.4–10.8)

## 2019-12-10 DIAGNOSIS — G62.0 CHEMOTHERAPY-INDUCED PERIPHERAL NEUROPATHY (HCC): ICD-10-CM

## 2019-12-10 DIAGNOSIS — T45.1X5A CHEMOTHERAPY-INDUCED PERIPHERAL NEUROPATHY (HCC): ICD-10-CM

## 2019-12-10 RX ORDER — GABAPENTIN 100 MG/1
200 CAPSULE ORAL 3 TIMES DAILY
Qty: 180 CAP | Refills: 2 | Status: SHIPPED | OUTPATIENT
Start: 2019-12-10 | End: 2020-04-28 | Stop reason: SDUPTHER

## 2019-12-10 RX ORDER — GABAPENTIN 100 MG/1
200 CAPSULE ORAL 3 TIMES DAILY
Qty: 180 CAP | Refills: 11 | Status: CANCELLED | OUTPATIENT
Start: 2019-12-10

## 2019-12-11 ENCOUNTER — DOCUMENTATION ONLY (OUTPATIENT)
Dept: ONCOLOGY | Age: 59
End: 2019-12-11

## 2019-12-11 DIAGNOSIS — G62.0 CHEMOTHERAPY-INDUCED PERIPHERAL NEUROPATHY (HCC): ICD-10-CM

## 2019-12-11 DIAGNOSIS — T45.1X5A CHEMOTHERAPY-INDUCED PERIPHERAL NEUROPATHY (HCC): ICD-10-CM

## 2019-12-11 DIAGNOSIS — G89.4 CHRONIC PAIN SYNDROME: ICD-10-CM

## 2019-12-11 RX ORDER — OXYCODONE AND ACETAMINOPHEN 5; 325 MG/1; MG/1
1 TABLET ORAL
Qty: 60 TAB | Refills: 0 | Status: SHIPPED | OUTPATIENT
Start: 2019-12-11 | End: 2020-03-18 | Stop reason: SDUPTHER

## 2019-12-11 NOTE — PROGRESS NOTES
Pain prescription refill was sent to patient's pharmacy today. No signs of medication abuse per [facility].

## 2020-01-21 ENCOUNTER — OFFICE VISIT (OUTPATIENT)
Dept: ONCOLOGY | Age: 60
End: 2020-01-21

## 2020-01-21 ENCOUNTER — HOSPITAL ENCOUNTER (OUTPATIENT)
Dept: ONCOLOGY | Age: 60
Discharge: HOME | End: 2020-01-21

## 2020-01-21 VITALS
RESPIRATION RATE: 16 BRPM | HEART RATE: 55 BPM | BODY MASS INDEX: 40.94 KG/M2 | HEIGHT: 64 IN | WEIGHT: 239.8 LBS | TEMPERATURE: 98.4 F | DIASTOLIC BLOOD PRESSURE: 70 MMHG | OXYGEN SATURATION: 99 % | SYSTOLIC BLOOD PRESSURE: 135 MMHG

## 2020-01-21 DIAGNOSIS — G89.4 CHRONIC PAIN SYNDROME: ICD-10-CM

## 2020-01-21 DIAGNOSIS — D05.12 DUCTAL CARCINOMA IN SITU (DCIS) OF LEFT BREAST: ICD-10-CM

## 2020-01-21 DIAGNOSIS — C50.911 INVASIVE DUCTAL CARCINOMA OF RIGHT BREAST, STAGE 1 (HCC): Primary | ICD-10-CM

## 2020-01-21 DIAGNOSIS — D64.9 CHRONIC ANEMIA: ICD-10-CM

## 2020-01-21 LAB
BASO+EOS+MONOS # BLD AUTO: 0.5 K/UL (ref 0–2.3)
BASO+EOS+MONOS NFR BLD AUTO: 9 % (ref 0.1–17)
DIFFERENTIAL METHOD BLD: ABNORMAL
ERYTHROCYTE [DISTWIDTH] IN BLOOD BY AUTOMATED COUNT: 13.6 % (ref 11.5–14.5)
HCT VFR BLD AUTO: 40.2 % (ref 36–48)
HGB BLD-MCNC: 12.3 G/DL (ref 12–16)
LYMPHOCYTES # BLD: 2.3 K/UL (ref 1.1–5.9)
LYMPHOCYTES NFR BLD: 40 % (ref 14–44)
MCH RBC QN AUTO: 23.6 PG (ref 25–35)
MCHC RBC AUTO-ENTMCNC: 30.6 G/DL (ref 31–37)
MCV RBC AUTO: 77 FL (ref 78–102)
NEUTS SEG # BLD: 2.9 K/UL (ref 1.8–9.5)
NEUTS SEG NFR BLD: 51 % (ref 40–70)
PLATELET # BLD AUTO: 273 K/UL (ref 140–440)
RBC # BLD AUTO: 5.22 M/UL (ref 4.1–5.1)
WBC # BLD AUTO: 5.7 K/UL (ref 4.5–13)

## 2020-01-21 NOTE — PROGRESS NOTES
Hematology/Oncology  Progress Note    Name: Ramos Barragan  Date: 2020  : 1960    PCP: Alina Mac MD     Ms. Alia Echavarria is a 61 y.o.  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    Current therapy: The patient completed adjuvant systemic chemotherapy      Subjective:     Ms. Alia Echavarria is a 27-year-old  woman who has a history of DCIS involving the left breast and invasive ductal adenocarcinoma of the right breast. She was diagnosed more than 5 years ago. The patient reports she has done well since she completed her systemic chemotherapy. She also underwent bilateral mastectomies. She decided not to proceed with reconstruction. She is requesting for a new prescription of her percocet. She has no physical complaints to report at this time. Past medical history, family history, and social history: these were reviewed and remains unchanged.     Past Medical History:   Diagnosis Date    Cancer [facility])     right breast cancer     DDD (degenerative disc disease), cervical 2017    Hypercholesterolemia     Hypertension      Past Surgical History:   Procedure Laterality Date    HX BREAST RECONSTRUCTION  2014    BILATERAL BREAST RECONSTRUCTION WITH TISSUE EXPANDERS performed by Melinda Frank MD at [facility] MAIN OR     [address]      HX CHOLECYSTECTOMY      HX GYN      fibroid tumors removed    HX MASTECTOMY  2014    BILATERAL BREAST MASTECTOMY SIMPLE performed by Pascale Cosme MD at [facility] MA[facility] OR    HX TUBAL LIGATION      HX VASCULAR ACCESS      left chest     Social History     Socioeconomic History    Marital status:      Spouse name: Not on file    Number of children: Not on file    Years of education: Not on file    Highest education level: Not on file   Occupational History    Not on file   Social Needs    Financial resource strain: Not on file    Food insecurity: Worry: Not on file     Inability: Not on file    Transportation needs:     Medical: Not on file     Non-medical: Not on file   Tobacco Use    Smoking status: Former Smoker     Types: Cigarettes    Smokeless tobacco: Never Used    Tobacco comment: quit 6/2014   Substance and Sexual Activity    Alcohol use: No     Comment: Socially    Drug use: No    Sexual activity: Not Currently   Lifestyle    Physical activity:     Days per week: Not on file     Minutes per session: Not on file    Stress: Not on file   Relationships    Social connections:     Talks on phone: Not on file     Gets together: Not on file     Attends Evangelical service: Not on file     Active member of club or organization: Not on file     Attends meetings of clubs or organizations: Not on file     Relationship status: Not on file    Intimate partner violence:     Fear of current or ex partner: Not on file     Emotionally abused: Not on file     Physically abused: Not on file     Forced sexual activity: Not on file   Other Topics Concern    Not on file   Social History Narrative    Not on file     Family History   Problem Relation Age of Onset    Hypertension Mother     Diabetes Father     Diabetes Daughter     Diabetes Son      Current Outpatient Medications   Medication Sig Dispense Refill    gabapentin (NEURONTIN) 100 mg capsule Take 2 Caps by mouth three (3) times daily for 90 days. Max Daily Amount: 600 mg. Indications: Neuropathic Pain 180 Cap 2    meloxicam (MOBIC) 15 mg tablet Take 1 Tab by mouth daily. 30 Tab 0    lovastatin (MEVACOR) 20 mg tablet Take 1 Tab by mouth daily. 90 Tab 1    triamterene-hydroCHLOROthiazide (DYAZIDE) 37.5-25 mg per capsule Take 1 Cap by mouth daily. 90 Cap 1    ferrous sulfate (IRON) 325 mg (65 mg iron) tablet Take 325 mg by mouth Daily (before breakfast). Indications: IRON DEFICIENCY ANEMIA         Review of Systems  Constitutional: The patient has no acute distress or discomfort.   HEENT: The patient denies recent head trauma, eye pain, blurred vision,  hearing deficit, oropharyngeal mucosal pain or lesions, and the patient denies throat pain or discomfort. Lymphatics: The patient denies palpable peripheral lymphadenopathy. Hematologic: The patient denies having bruising, bleeding, or progressive fatigue. Respiratory: Patient denies having shortness of breath, cough, sputum production, fever, or dyspnea on exertion. Cardiovascular: The patient denies having leg pain, leg swelling, heart palpitations, chest permit, chest pain, or lightheadedness. The patient denies having dyspnea on exertion. Gastrointestinal: The patient denies having nausea, emesis, or diarrhea. The patient denies having any hematemesis or blood in the stool. Genitourinary: Patient denies having urinary urgency, frequency, or dysuria. The patient denies having blood in the urine. Psychological: The patient denies having symptoms of nervousness, anxiety, depression, or thoughts of harming self. Skin: Patient denies having skin rashes, skin, ulcerations, or unexplained itching or pruritus. Musculoskeletal: The patient denies having pain in the joints or bones. Objective:     Visit Vitals  /70   Pulse (!) 55   Temp 98.4 °F (36.9 °C) (Oral)   Resp 16   Ht 5' 4\" (1.626 m)   Wt 108.8 kg (239 lb 12.8 oz)   SpO2 99%   BMI 41.16 kg/m²     ECOG PS=0, pain score=0/10   Physical Exam:   Gen. Appearance: The patient is in no acute distress. Skin: There is no bruise or rash. HEENT: The exam is unremarkable. Neck: Supple without lymphadenopathy or thyromegaly. Lungs: Clear to auscultation and percussion; there are no wheezes or rhonchi. Heart: Regular rate and rhythm; there are no murmurs, gallops, or rubs. Chest Wall and Axilla: No palpable masses, no evidence of disease recurrence. Abdomen: Bowel sounds are present and normal.  There is no guarding, tenderness, or hepatosplenomegaly. Extremities:  There is no clubbing, cyanosis, or edema. Neurologic: There are no focal neurologic deficits. Lymphatics: There is no palpable peripheral lymphadenopathy. Musculoskeletal: The patient has full range of motion at all joints. There is no evidence of joint deformity or effusions. There is no focal joint tenderness. Psychological/psychiatric: There is no clinical evidence of anxiety, depression, or melancholy. Lab data:      Results for orders placed or performed during the hospital encounter of 01/21/20   CBC WITH 3 PART DIFF     Status: Abnormal   Result Value Ref Range Status    WBC 5.7 4.5 - 13.0 K/uL Final    RBC 5.22 (H) 4.10 - 5.10 M/uL Final    HGB 12.3 12.0 - 16.0 g/dL Final    HCT 40.2 36 - 48 % Final    MCV 77.0 (L) 78 - 102 FL Final    MCH 23.6 (L) 25.0 - 35.0 PG Final    MCHC 30.6 (L) 31 - 37 g/dL Final    RDW 13.6 11.5 - 14.5 % Final    PLATELET 341 961 - 368 K/uL Final    NEUTROPHILS 51 40 - 70 % Final    MIXED CELLS 9 0.1 - 17 % Final    LYMPHOCYTES 40 14 - 44 % Final    ABS. NEUTROPHILS 2.9 1.8 - 9.5 K/UL Final    ABS. MIXED CELLS 0.5 0.0 - 2.3 K/uL Final    ABS. LYMPHOCYTES 2.3 1.1 - 5.9 K/UL Final     Comment: Test performed at [address] [place] [address] or Outpatient [facility]fusion [facility] Location. Reviewed by Medical Director. DF AUTOMATED   Final           Assessment:     1. Invasive ductal carcinoma of right breast, stage 1 (HCC)    2. Ductal carcinoma in situ (DCIS) of left breast    3. Chronic anemia    4. Chronic pain syndrome      Plan:     DCIS left breast/invasive ductal carcinoma right breast the patient is doing well and clinically there is no evidence of disease recurrence. I have instructed the patient to continue doing her chest wall and axilla exam on a monthly basis. The most recent CA-27-29 level from 10/16/2019 was normal at 25.8 units/mL. At this time I will recheck her CA-27-29 level.     Chronic Anemia: Her most recent CBC showed her hemoglobin was normal at 12.3 g/dL with hematocrit of 40.2%. Based on her values it shows her anemia has been resolved. We will continue to monitor at this time. Iron Profile and Ferritin will be obtained. Chemotherapy-induced peripheral neuropathy: I have instructed the patient to continue taking the Neurontin 200mg PO TID. She also takes Percocet 5mg every 4-6 hours PRN for pain. I will renew her Rx today. Arthritis: I have instructed the patient to continue using her Mobic as prescribed by her PCP and to do her exercises as needed for arthritis. She also takes the Percocet 5mg every 4-6 hours as well which she states is helping with her symptoms. I plan to see the patient in 3 months or sooner if indicated.   Orders Placed This Encounter    COMPLETE CBC & AUTO DIFF WBC    InHouse CBC (Pager)     Standing Status:   Future     Number of Occurrences:   1     Standing Expiration Date:   0/70/6650    METABOLIC PANEL, COMPREHENSIVE     Standing Status:   Future     Number of Occurrences:   1     Standing Expiration Date:   1/21/2021    IRON PROFILE     Standing Status:   Future     Number of Occurrences:   1     Standing Expiration Date:   1/21/2021    FERRITIN     Standing Status:   Future     Number of Occurrences:   1     Standing Expiration Date:   1/21/2021    CA 27.29     Standing Status:   Future     Number of Occurrences:   1     Standing Expiration Date:   1/21/2021       Earnstine Bence, MD  1/21/2020

## 2020-01-22 LAB
A-G RATIO,AGRAT: 1.2 RATIO (ref 1.1–2.6)
ALBUMIN SERPL-MCNC: 4.3 G/DL (ref 3.5–5)
ALP SERPL-CCNC: 87 U/L (ref 40–120)
ALT SERPL-CCNC: 15 U/L (ref 5–40)
ANION GAP SERPL CALC-SCNC: 12 MMOL/L
AST SERPL W P-5'-P-CCNC: 18 U/L (ref 10–37)
BILIRUB SERPL-MCNC: 0.4 MG/DL (ref 0.2–1.2)
BUN SERPL-MCNC: 16 MG/DL (ref 6–22)
CALCIUM SERPL-MCNC: 9.5 MG/DL (ref 8.4–10.5)
CHLORIDE SERPL-SCNC: 101 MMOL/L (ref 98–110)
CO2 SERPL-SCNC: 25 MMOL/L (ref 20–32)
CREAT SERPL-MCNC: 0.7 MG/DL (ref 0.8–1.4)
FE % SATURATION,PSAT: 35 % (ref 20–50)
FERRITIN SERPL-MCNC: 720 NG/ML (ref 10–291)
GFRAA, 66117: >60
GFRNA, 66118: >60
GLOBULIN,GLOB: 3.6 G/DL (ref 2–4)
GLUCOSE SERPL-MCNC: 88 MG/DL (ref 70–99)
IRON,IRN: 82 MCG/DL (ref 30–160)
POTASSIUM SERPL-SCNC: 3.6 MMOL/L (ref 3.5–5.5)
PROT SERPL-MCNC: 7.9 G/DL (ref 6.2–8.1)
SODIUM SERPL-SCNC: 138 MMOL/L (ref 133–145)
TIBC,TIBC: 235 MCG/DL (ref 228–428)
UIBC SERPL-MCNC: 152 MCG/DL (ref 110–370)

## 2020-01-23 LAB — CA 27.29, 142134: 26.2 U/ML (ref 0–38.6)

## 2020-03-18 DIAGNOSIS — T45.1X5A CHEMOTHERAPY-INDUCED PERIPHERAL NEUROPATHY (HCC): ICD-10-CM

## 2020-03-18 DIAGNOSIS — G62.0 CHEMOTHERAPY-INDUCED PERIPHERAL NEUROPATHY (HCC): ICD-10-CM

## 2020-03-18 DIAGNOSIS — G89.4 CHRONIC PAIN SYNDROME: ICD-10-CM

## 2020-03-18 RX ORDER — OXYCODONE AND ACETAMINOPHEN 5; 325 MG/1; MG/1
1 TABLET ORAL
Qty: 60 TAB | Refills: 0 | Status: SHIPPED | OUTPATIENT
Start: 2020-03-18 | End: 2020-03-27 | Stop reason: SDUPTHER

## 2020-03-27 DIAGNOSIS — G62.0 CHEMOTHERAPY-INDUCED PERIPHERAL NEUROPATHY (HCC): Primary | ICD-10-CM

## 2020-03-27 DIAGNOSIS — G62.0 CHEMOTHERAPY-INDUCED PERIPHERAL NEUROPATHY (HCC): ICD-10-CM

## 2020-03-27 DIAGNOSIS — T45.1X5A CHEMOTHERAPY-INDUCED PERIPHERAL NEUROPATHY (HCC): ICD-10-CM

## 2020-03-27 DIAGNOSIS — T45.1X5A CHEMOTHERAPY-INDUCED PERIPHERAL NEUROPATHY (HCC): Primary | ICD-10-CM

## 2020-03-27 DIAGNOSIS — G89.4 CHRONIC PAIN SYNDROME: ICD-10-CM

## 2020-03-27 RX ORDER — OXYCODONE AND ACETAMINOPHEN 5; 325 MG/1; MG/1
1 TABLET ORAL
Qty: 60 TAB | Refills: 0 | Status: SHIPPED | OUTPATIENT
Start: 2020-03-27 | End: 2020-06-30 | Stop reason: SDUPTHER

## 2020-03-27 RX ORDER — GABAPENTIN 100 MG/1
100 CAPSULE ORAL 3 TIMES DAILY
Qty: 90 CAP | Refills: 3 | Status: SHIPPED | OUTPATIENT
Start: 2020-03-27 | End: 2020-09-17 | Stop reason: SDUPTHER

## 2020-03-27 NOTE — TELEPHONE ENCOUNTER
The patient called to ask for a refill for the Gabapentin and Percocet, please, however; I do not see the Gabapentin.

## 2020-04-28 ENCOUNTER — VIRTUAL VISIT (OUTPATIENT)
Dept: ONCOLOGY | Age: 60
End: 2020-04-28

## 2020-04-28 VITALS — HEIGHT: 64 IN | BODY MASS INDEX: 41.16 KG/M2

## 2020-04-28 DIAGNOSIS — G89.4 CHRONIC PAIN SYNDROME: ICD-10-CM

## 2020-04-28 DIAGNOSIS — C50.911 INVASIVE DUCTAL CARCINOMA OF RIGHT BREAST, STAGE 1 (HCC): Primary | ICD-10-CM

## 2020-04-28 DIAGNOSIS — T45.1X5A CHEMOTHERAPY-INDUCED PERIPHERAL NEUROPATHY (HCC): ICD-10-CM

## 2020-04-28 DIAGNOSIS — C50.911 INVASIVE DUCTAL CARCINOMA OF RIGHT BREAST, STAGE 1 (HCC): ICD-10-CM

## 2020-04-28 DIAGNOSIS — G62.0 CHEMOTHERAPY-INDUCED PERIPHERAL NEUROPATHY (HCC): ICD-10-CM

## 2020-04-28 DIAGNOSIS — D64.9 CHRONIC ANEMIA: ICD-10-CM

## 2020-04-28 DIAGNOSIS — D05.12 DUCTAL CARCINOMA IN SITU (DCIS) OF LEFT BREAST: ICD-10-CM

## 2020-04-28 RX ORDER — GABAPENTIN 100 MG/1
200 CAPSULE ORAL 3 TIMES DAILY
Qty: 180 CAP | Refills: 2 | Status: SHIPPED | OUTPATIENT
Start: 2020-04-28 | End: 2020-09-30 | Stop reason: SDUPTHER

## 2020-04-28 NOTE — PROGRESS NOTES
Hematology/medical oncology progress note          4/28/2020  Elta Sandhoff is a 61 y.o. female evaluated via telephone on 4/28/2020. YOB: 1960    PCP: Dr. Evaristo Huffman    Diagnosis: Invasive ductal carcinoma the right breast, DCIS left breast, iron deficiency anemia, chronic pain syndrome    Consent:  She and/or health care decision maker is aware that she may receive a bill for this telephone service, depending on her insurance coverage, and has provided verbal consent to proceed: YES      Documentation:  I communicated with the patient and/or health care decision maker about long-term management of her breast cancer. I have explained to the patient that her last clinic visit on 1/23/2020 her CA 27-29 level was normal at 26.2 units/mL. The CBC at the time showed that her hemoglobin had finally corrected to a normal value of 12.3 g/dL with hematocrit of 40.2%. Her WBC and platelets were normal.  The ferritin level was 720 ng, iron was 82 mcg/dL and iron saturation was 35%. I scheduling the patient for a follow-up visit in the clinic in about 8 weeks due to the coronavirus pandemic. We will make arrangements to have a CBC, comprehensive metabolic panel, iron profile, ferritin level, and CA 2729 level completed. The patient had her questions answered to her satisfaction. Total time 25 minutes, greater than 50% of the time was in counseling and coordination of care. I AFFIRM this is a Patient Initiated Episode with an Established Patient who has not had a related appointment within my department in the past 7 days or scheduled within the next 24 hours. Total Time: Total time 25 minutes, greater than 50% of the time was in counseling and coordination of care. Note: not billable if this call serves to triage the patient into an appointment for the relevant concern      Les Goddard. Clayton Pedraza MD, [place]

## 2020-04-29 DIAGNOSIS — G89.4 CHRONIC PAIN SYNDROME: ICD-10-CM

## 2020-04-29 DIAGNOSIS — D64.9 CHRONIC ANEMIA: ICD-10-CM

## 2020-04-29 DIAGNOSIS — C50.911 INVASIVE DUCTAL CARCINOMA OF RIGHT BREAST, STAGE 1 (HCC): ICD-10-CM

## 2020-04-29 DIAGNOSIS — G62.0 CHEMOTHERAPY-INDUCED PERIPHERAL NEUROPATHY (HCC): ICD-10-CM

## 2020-04-29 DIAGNOSIS — D05.12 DUCTAL CARCINOMA IN SITU (DCIS) OF LEFT BREAST: ICD-10-CM

## 2020-04-29 DIAGNOSIS — T45.1X5A CHEMOTHERAPY-INDUCED PERIPHERAL NEUROPATHY (HCC): ICD-10-CM

## 2020-06-30 ENCOUNTER — OFFICE VISIT (OUTPATIENT)
Dept: ONCOLOGY | Age: 60
End: 2020-06-30

## 2020-06-30 VITALS
WEIGHT: 239.8 LBS | SYSTOLIC BLOOD PRESSURE: 136 MMHG | TEMPERATURE: 98.4 F | OXYGEN SATURATION: 98 % | DIASTOLIC BLOOD PRESSURE: 88 MMHG | RESPIRATION RATE: 18 BRPM | HEART RATE: 66 BPM | BODY MASS INDEX: 41.16 KG/M2

## 2020-06-30 DIAGNOSIS — M19.90 ARTHRITIS: ICD-10-CM

## 2020-06-30 DIAGNOSIS — G89.4 CHRONIC PAIN SYNDROME: ICD-10-CM

## 2020-06-30 DIAGNOSIS — D05.12 DUCTAL CARCINOMA IN SITU (DCIS) OF LEFT BREAST: ICD-10-CM

## 2020-06-30 DIAGNOSIS — G62.0 CHEMOTHERAPY-INDUCED PERIPHERAL NEUROPATHY (HCC): ICD-10-CM

## 2020-06-30 DIAGNOSIS — C50.919 TRIPLE NEGATIVE MALIGNANT NEOPLASM OF BREAST (HCC): ICD-10-CM

## 2020-06-30 DIAGNOSIS — C50.911 INVASIVE DUCTAL CARCINOMA OF RIGHT BREAST, STAGE 1 (HCC): Primary | ICD-10-CM

## 2020-06-30 DIAGNOSIS — T45.1X5A CHEMOTHERAPY-INDUCED PERIPHERAL NEUROPATHY (HCC): ICD-10-CM

## 2020-06-30 RX ORDER — OXYCODONE AND ACETAMINOPHEN 5; 325 MG/1; MG/1
1 TABLET ORAL
Qty: 60 TAB | Refills: 0 | Status: SHIPPED | OUTPATIENT
Start: 2020-06-30 | End: 2020-07-15

## 2020-06-30 NOTE — PROGRESS NOTES
Hematology/Oncology  Progress Note    Name: Juan Alberto Rosenberg  Date: 2020  : 1960    PCP: Campos Redding MD       Ms. Fortino Smith is a 61 y.o.  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  -- B/l Mastectomy 2014  -- The patient completed adjuvant systemic chemotherapy  -- Presently on surveillance    Subjective:     Ms. Fortino Smith is a 61-year-old  woman who has a history of DCIS involving the left breast and invasive ductal adenocarcinoma of the right breast. She was diagnosed more than 5 years ago. The patient reports she has done fairly well since she completed her systemic chemotherapy. She also underwent bilateral mastectomies. She decided not to proceed with reconstruction. She is requesting for a new prescription of her percocet for her chronic neuropathy with tingling and numbness. She also has chronic pain from osteoarthritis. She was agreeable for pain management referral.  The patient otherwise has no other complaints. Denied fever, chills, night sweat, unintentional weight loss, skin lumps or bumps, acute bleeding or bruising issues. Denied headache, acute vision change, dizziness, chest pain, worsen shortness of breath, palpitation, productive cough, nausea, vomiting, abdominal pain, altered bowel habits, dysuria, new bone pain or back pain, focal numbness or weakness. Independent with ADLs and IADLs. Past medical history, family history, and social history: these were reviewed and remains unchanged.     Past Medical History:   Diagnosis Date    Cancer [facility])     right breast cancer     DDD (degenerative disc disease), cervical 2017    Hypercholesterolemia     Hypertension      Past Surgical History:   Procedure Laterality Date    HX BREAST RECONSTRUCTION  2014    BILATERAL BREAST RECONSTRUCTION WITH TISSUE EXPANDERS performed by Summer Astudillo MD at [address] [place] [address] CHOLECYSTECTOMY      HX GYN      fibroid tumors removed    HX MASTECTOMY  8/13/2014    BILATERAL BREAST MASTECTOMY SIMPLE performed by Torey Ibarra MD at [facility] MAIN OR    HX TUBAL LIGATION      HX VASCULAR ACCESS      left chest     Social History     Socioeconomic History    Marital status:      Spouse name: Not on file    Number of children: Not on file    Years of education: Not on file    Highest education level: Not on file   Occupational History    Not on file   Social Needs    Financial resource strain: Not on file    Food insecurity     Worry: Not on file     Inability: Not on file    Transportation needs     Medical: Not on file     Non-medical: Not on file   Tobacco Use    Smoking status: Former Smoker     Types: Cigarettes    Smokeless tobacco: Never Used    Tobacco comment: quit 6/2014   Substance and Sexual Activity    Alcohol use: No     Comment: Socially    Drug use: No    Sexual activity: Not Currently   Lifestyle    Physical activity     Days per week: Not on file     Minutes per session: Not on file    Stress: Not on file   Relationships    Social connections     Talks on phone: Not on file     Gets together: Not on file     Attends Voodoo service: Not on file     Active member of club or organization: Not on file     Attends meetings of clubs or organizations: Not on file     Relationship status: Not on file    Intimate partner violence     Fear of current or ex partner: Not on file     Emotionally abused: Not on file     Physically abused: Not on file     Forced sexual activity: Not on file   Other Topics Concern    Not on file   Social History Narrative    Not on file     Family History   Problem Relation Age of Onset    Hypertension Mother     Diabetes Father     Diabetes Daughter     Diabetes Son      Current Outpatient Medications   Medication Sig Dispense Refill    gabapentin (NEURONTIN) 100 mg capsule Take 2 Caps by mouth three (3) times daily for 90 days. Max Daily Amount: 600 mg. Indications: neuropathic pain 180 Cap 2    gabapentin (NEURONTIN) 100 mg capsule Take 1 Cap by mouth three (3) times daily. Max Daily Amount: 300 mg. 90 Cap 3    meloxicam (MOBIC) 15 mg tablet Take 1 Tab by mouth daily. 30 Tab 0    lovastatin (MEVACOR) 20 mg tablet Take 1 Tab by mouth daily. 90 Tab 1    triamterene-hydroCHLOROthiazide (DYAZIDE) 37.5-25 mg per capsule Take 1 Cap by mouth daily. 90 Cap 1    ferrous sulfate (IRON) 325 mg (65 mg iron) tablet Take 325 mg by mouth Daily (before breakfast). Indications: IRON DEFICIENCY ANEMIA         Review of Systems   Constitutional: Negative for chills, diaphoresis, fever, malaise/fatigue and weight loss. Respiratory: Negative for cough, hemoptysis, shortness of breath and wheezing. Cardiovascular: Negative for chest pain, palpitations and leg swelling. Gastrointestinal: Negative for abdominal pain, diarrhea, heartburn, nausea and vomiting. Genitourinary: Negative for dysuria, frequency, hematuria and urgency. Musculoskeletal: Positive for joint pain. Negative for myalgias. Skin: Negative for itching and rash. Neurological: Positive for tingling. Negative for dizziness, seizures, weakness and headaches. Psychiatric/Behavioral: Negative for depression. The patient does not have insomnia. Objective:     Visit Vitals  /88 (BP Patient Position: Sitting)   Pulse 66   Temp 98.4 °F (36.9 °C) (Oral)   Resp 18   Wt 108.8 kg (239 lb 12.8 oz)   SpO2 98%   BMI 41.16 kg/m²       ECOG Performance Status (grade): 0  0 - able to carry on all pre-disease activity w/out restriction  1 - restricted but able to carry out light work  2 - ambulatory and can self- care but unable to carry out work  3 - bed or chair >50% of waking hours  4 - completely disable, total care, confined to bed or chair    Physical Exam  Constitutional:       Appearance: Normal appearance. HENT:      Head: Normocephalic and atraumatic. Eyes:      Pupils: Pupils are equal, round, and reactive to light. Neck:      Musculoskeletal: Neck supple. Cardiovascular:      Rate and Rhythm: Normal rate and regular rhythm. Heart sounds: Normal heart sounds. Pulmonary:      Effort: Pulmonary effort is normal.      Breath sounds: Normal breath sounds. Abdominal:      General: Bowel sounds are normal.      Palpations: Abdomen is soft. Tenderness: There is no abdominal tenderness. There is no guarding. Musculoskeletal: Normal range of motion. Right lower leg: No edema. Left lower leg: No edema. Skin:     General: Skin is warm. Neurological:      General: No focal deficit present. Mental Status: She is alert and oriented to person, place, and time. Mental status is at baseline. Diagnostics:      No results found for this or any previous visit (from the past 96 hour(s)). Imaging:  No results found for this or any previous visit. Results for orders placed during the hospital encounter of 06/26/17   XR SPINE CERV PA LAT ODONT 3 V MAX    Narrative Cervical Spine AP And Lateral    CPT CODE: 38627    HISTORY: , neck pain, left arm pain with numbness and tingling. COMPARISON: None. FINDINGS:    Two views obtained. The lateral view demonstrates from skull base to T1. The  vertebra are normal in height and alignment. The disc spaces are moderately  narrowed from C3 through C6 with ventral spurring. The prevertebral soft  tissues are normal.  There is no evidence of fracture or dislocation. Impression IMPRESSION:    Multilevel moderate degenerative disc disease of the mid to lower cervical spine       Results for orders placed during the hospital encounter of 07/08/14   CT CHEST ABD PELV W CONT    Narrative CT  CHEST, ABDOMEN AND PELVIS WITH CONTRAST    CPT CODE: [id number], [id number], [id number]    INDICATION: New diagnosis breast cancer, staging, BREAST CANCER. COMPARISON: Mammogram 6/10/14.     TECHNIQUE:  Standard helical images were obtained from the thoracic inlet  through the inferior pubic rami at 5 mm thick sections following the  intravenous injection of a bolus of  100 cc nonionic contrast.  Images were  reviewed on both soft tissue, lung, and bone window settings. Coronal and  sagittal reformations obtained. CTDIvol DLP 1080.37 CTDIvol mGy    CT CHEST FINDINGS:    The included portion of the thyroid is demonstrates a bilateral tiny  intrathyroid hypodensity measuring less than 5 mm, image 2 series 2..  Minimal micronodular thickening of the left major fissure at the inferior hilar  level, image 28 series 4. There is no evidence of mediastinal, hilar, nor axillary adenopathy. The great vessels and thoracic aorta are unremarkable. There are no pleural effusions. The breasts are incompletely included on this exam. Asymmetrical soft tissue  densities are seen within the medial breasts bilaterally. CT ABDOMEN FINDINGS:    The liver and spleen are normal in size and density. The biliary tree is not  dilated. There is bilateral renal function without obstruction, without cyst, stone or  mass. Adrenals and Pancreas  are of normal CT appearance. There is no free fluid or free air. The large and small bowel seem unremarkable. There are several minimally enlarged periportal nodes which are nonspecific. 1.5 x 1 cm node image 74 series 2. Portacaval node measures 1.2 x 0.6 cm. Image  75 series 2. CT PELVIS FINDINGS:    There is no free pelvic fluid. The uterus is not enlarged. There are however several small hypodense masses  within the myometrium. No abnormality of the ovaries. The bladder is incompletely distended but unremarkable. There is no significant adenopathy. Posterior articular facet disease of the lower lumbar spine noted. Impression IMPRESSION:    Several minimally enlarged periportal/peter hepatis lymph nodes are  nonspecific.  Followup in 6 months may be of benefit as clinically indicated. Probable small uterine fibroids or adenomyosis. Bilateral into thyroid hypodensity is nonspecific but likely benign. Minimal nodular thickening of the major fissure on the left is likely benign. Asymmetric soft tissue densities within the breasts incompletely included on  this exam.             Assessment:     1. Invasive ductal carcinoma of right breast, stage 1 (HCC)    2. Ductal carcinoma in situ (DCIS) of left breast    3. Triple negative malignant neoplasm of breast ([facility]    4. Chemotherapy-induced peripheral neuropathy ([facility]    5. Chronic pain syndrome    6. Arthritis      Plan:     # DCIS left breast/ Invasive ductal carcinoma right breast   # Triple negative breast cancer  -- B/l Mastectomy 6/2014  -- The patient had completed adjuvant systemic chemotherapy, carboplatin and Taxotere. -- Since she had triple negative for cancer, she was not a candidate for antiestrogen therapy. -- Clinically the patient is doing well and has no evidence of disease recurrence. Plan:  -- The patient was instructed to continue doing her chest wall and axilla exams on a monthly basis. -- The patient was advised to notify us if any breast pain, lumps or bumps, breast skin changes, unintentional weight loss, worsen fatigue, new bone pain or back pain, or any concerns. -- I have advised her to follow up her PCP to continue age-appropriate cancer screening. -- I have educated her regarding lifestyle modifications, minimizing alcohol intake, refraining from smoking, healthy diet, and physical activity. -- We will continue to monitor CBC, chemistry, CA 2729. # Chronic Anemia:   -- Her most recent CBC showed her hemoglobin was normal at 12.3 g/dL with hematocrit of 40.2%. -- We will continue to monitor CBC at this time. # Chemotherapy-induced peripheral neuropathy:  -- She has been taking Neurontin 200mg PO TID. She also takes Percocet 5mg every 4-6 hours PRN for pain.    -- We will refer her to pain management for long-term pain control. We will renew her pain Rx today while she is waiting for pain management visit. .     # Chronic pain syndrome, Arthritis:   -- She has been taking the Percocet 5mg every 4-6 hours as well which she states is helping with her symptoms. -- Pain management refferal      -- We will see the patient back in clinic in about 3 months. Always sooner if required. The patient can have lab done prior our next clinic visit. Orders Placed This Encounter    oxyCODONE-acetaminophen (PERCOCET) 5-325 mg per tablet     Sig: Take 1 Tab by mouth every six (6) hours as needed for Pain for up to 15 days. Max Daily Amount: 4 Tabs. Dispense:  60 Tab     Refill:  0           Ms. Lobito Parsons has a reminder for a \"due or due soon\" health maintenance. I have asked that she contact her primary care provider for follow-up on this health maintenance. All of patient's questions answered to their apparent satisfaction. They verbally show understanding and agreement with aforementioned plan. Meche Burdick MD  6/30/2020          About 25 minutes were spent for this encounter with more than 50% of the time spent in face-to-face counseling, discussing on diagnosis and management plan going forward, and co-ordination of care. Parts of this document has been produced using Dragon dictation system. Unrecognized errors in transcription may be present. Please do not hesitate to reach out for any questions or clarifications.       CC: Angelita Uriarte MD

## 2020-07-01 LAB
A-G RATIO,AGRAT: 1.3 RATIO (ref 1.1–2.6)
ABSOLUTE LYMPHOCYTE COUNT, 10803: 2 K/UL (ref 1–4.8)
ALBUMIN SERPL-MCNC: 4.3 G/DL (ref 3.5–5)
ALP SERPL-CCNC: 81 U/L (ref 40–120)
ALT SERPL-CCNC: 16 U/L (ref 5–40)
ANION GAP SERPL CALC-SCNC: 13 MMOL/L
AST SERPL W P-5'-P-CCNC: 18 U/L (ref 10–37)
BASOPHILS # BLD: 0.1 K/UL (ref 0–0.2)
BASOPHILS NFR BLD: 1 % (ref 0–2)
BILIRUB SERPL-MCNC: 0.3 MG/DL (ref 0.2–1.2)
BUN SERPL-MCNC: 15 MG/DL (ref 6–22)
CALCIUM SERPL-MCNC: 9.9 MG/DL (ref 8.4–10.5)
CHLORIDE SERPL-SCNC: 100 MMOL/L (ref 98–110)
CO2 SERPL-SCNC: 26 MMOL/L (ref 20–32)
CREAT SERPL-MCNC: 0.8 MG/DL (ref 0.8–1.4)
EOSINOPHIL # BLD: 0.3 K/UL (ref 0–0.5)
EOSINOPHIL NFR BLD: 6 % (ref 0–6)
ERYTHROCYTE [DISTWIDTH] IN BLOOD BY AUTOMATED COUNT: 15.2 % (ref 10–15.5)
FE % SATURATION,PSAT: 47 % (ref 20–50)
FERRITIN SERPL-MCNC: 790 NG/ML (ref 10–291)
GFRAA, 66117: >60
GFRNA, 66118: >60
GLOBULIN,GLOB: 3.2 G/DL (ref 2–4)
GLUCOSE SERPL-MCNC: 90 MG/DL (ref 70–99)
GRANULOCYTES,GRANS: 50 % (ref 40–75)
HCT VFR BLD AUTO: 43 % (ref 35.1–48)
HGB BLD-MCNC: 12.4 G/DL (ref 11.7–16)
IRON,IRN: 105 MCG/DL (ref 30–160)
LYMPHOCYTES, LYMLT: 35 % (ref 20–45)
MCH RBC QN AUTO: 23 PG (ref 26–34)
MCHC RBC AUTO-ENTMCNC: 29 G/DL (ref 31–36)
MCV RBC AUTO: 80 FL (ref 81–99)
MONOCYTES # BLD: 0.5 K/UL (ref 0.1–1)
MONOCYTES NFR BLD: 8 % (ref 3–12)
NEUTROPHILS # BLD AUTO: 2.8 K/UL (ref 1.8–7.7)
PLATELET # BLD AUTO: 282 K/UL (ref 140–440)
PMV BLD AUTO: 11.2 FL (ref 9–13)
POTASSIUM SERPL-SCNC: 4 MMOL/L (ref 3.5–5.5)
PROT SERPL-MCNC: 7.5 G/DL (ref 6.2–8.1)
RBC # BLD AUTO: 5.36 M/UL (ref 3.8–5.2)
SODIUM SERPL-SCNC: 139 MMOL/L (ref 133–145)
TIBC,TIBC: 224 MCG/DL (ref 228–428)
UIBC SERPL-MCNC: 119 MCG/DL (ref 110–370)
WBC # BLD AUTO: 5.7 K/UL (ref 4–11)

## 2020-07-05 LAB — CA 27.29, 142134: 23.4 U/ML (ref 0–38.6)

## 2020-09-17 DIAGNOSIS — G62.0 CHEMOTHERAPY-INDUCED PERIPHERAL NEUROPATHY (HCC): ICD-10-CM

## 2020-09-17 DIAGNOSIS — T45.1X5A CHEMOTHERAPY-INDUCED PERIPHERAL NEUROPATHY (HCC): ICD-10-CM

## 2020-09-18 RX ORDER — GABAPENTIN 100 MG/1
100 CAPSULE ORAL 3 TIMES DAILY
Qty: 90 CAP | Refills: 3 | Status: SHIPPED | OUTPATIENT
Start: 2020-09-18 | End: 2021-02-08 | Stop reason: SDUPTHER

## 2020-09-23 ENCOUNTER — LAB ONLY (OUTPATIENT)
Dept: ONCOLOGY | Age: 60
End: 2020-09-23

## 2020-09-23 DIAGNOSIS — G62.0 CHEMOTHERAPY-INDUCED PERIPHERAL NEUROPATHY (HCC): Primary | ICD-10-CM

## 2020-09-23 DIAGNOSIS — T45.1X5A CHEMOTHERAPY-INDUCED PERIPHERAL NEUROPATHY (HCC): Primary | ICD-10-CM

## 2020-09-23 DIAGNOSIS — C50.911 INVASIVE DUCTAL CARCINOMA OF RIGHT BREAST, STAGE 1 (HCC): ICD-10-CM

## 2020-09-23 DIAGNOSIS — C50.919 TRIPLE NEGATIVE MALIGNANT NEOPLASM OF BREAST (HCC): ICD-10-CM

## 2020-09-23 DIAGNOSIS — D64.9 CHRONIC ANEMIA: ICD-10-CM

## 2020-09-30 ENCOUNTER — OFFICE VISIT (OUTPATIENT)
Dept: ONCOLOGY | Age: 60
End: 2020-09-30

## 2020-09-30 VITALS
RESPIRATION RATE: 18 BRPM | BODY MASS INDEX: 40.97 KG/M2 | WEIGHT: 240 LBS | HEIGHT: 64 IN | DIASTOLIC BLOOD PRESSURE: 85 MMHG | OXYGEN SATURATION: 97 % | HEART RATE: 76 BPM | SYSTOLIC BLOOD PRESSURE: 146 MMHG

## 2020-09-30 DIAGNOSIS — D05.12 DUCTAL CARCINOMA IN SITU (DCIS) OF LEFT BREAST: ICD-10-CM

## 2020-09-30 DIAGNOSIS — T45.1X5A CHEMOTHERAPY-INDUCED PERIPHERAL NEUROPATHY (HCC): ICD-10-CM

## 2020-09-30 DIAGNOSIS — C50.919 TRIPLE NEGATIVE MALIGNANT NEOPLASM OF BREAST (HCC): ICD-10-CM

## 2020-09-30 DIAGNOSIS — G89.4 CHRONIC PAIN SYNDROME: ICD-10-CM

## 2020-09-30 DIAGNOSIS — G62.9 NEUROPATHY: ICD-10-CM

## 2020-09-30 DIAGNOSIS — C50.911 INVASIVE DUCTAL CARCINOMA OF RIGHT BREAST, STAGE 1 (HCC): Primary | ICD-10-CM

## 2020-09-30 DIAGNOSIS — D05.10 DUCTAL CARCINOMA IN SITU (DCIS) OF BREAST, UNSPECIFIED LATERALITY: ICD-10-CM

## 2020-09-30 DIAGNOSIS — G62.0 CHEMOTHERAPY-INDUCED PERIPHERAL NEUROPATHY (HCC): ICD-10-CM

## 2020-09-30 PROCEDURE — 99214 OFFICE O/P EST MOD 30 MIN: CPT | Performed by: NURSE PRACTITIONER

## 2020-09-30 RX ORDER — GABAPENTIN 100 MG/1
200 CAPSULE ORAL 3 TIMES DAILY
Qty: 180 CAP | Refills: 2 | Status: SHIPPED | OUTPATIENT
Start: 2020-09-30 | End: 2021-03-05 | Stop reason: SDUPTHER

## 2020-09-30 NOTE — PATIENT INSTRUCTIONS
Breast Cancer: Care Instructions Your Care Instructions Breast cancer occurs when abnormal cells grow out of control in the breast. These cancer cells can spread within the breast, to nearby lymph nodes and other tissues, and to other parts of the body. Being treated for cancer can weaken your body, and you may feel very tired. Get the rest your body needs so you can feel better. Finding out that you have cancer is scary. You may feel many emotions and may need some help coping. Seek out family, friends, and counselors for support. You also can do things at home to make yourself feel better while you go through treatment. Call the [employer] ([phone]) or visit its website at [employer] for more information. Follow-up care is a key part of your treatment and safety. Be sure to make and go to all appointments, and call your doctor if you are having problems. It's also a good idea to know your test results and keep a list of the medicines you take. How can you care for yourself at home? · Take your medicines exactly as prescribed. Call your doctor if you think you are having a problem with your medicine. You may get medicine for nausea and vomiting if you have these side effects. · Follow your doctor's instructions to relieve pain. Pain from cancer and surgery can almost always be controlled. Use pain medicine when you first notice pain, before it becomes severe. · Eat healthy food. If you do not feel like eating, try to eat food that has protein and extra calories to keep up your strength and prevent weight loss. Drink liquid meal replacements for extra calories and protein. Try to eat your main meal early. · Get some physical activity every day, but do not get too tired. Keep doing the hobbies you enjoy as your energy allows. · Do not smoke. Smoking can make your cancer worse. If you need help quitting, talk to your doctor about stop-smoking programs and medicines. These can increase your chances of quitting for good. · Take steps to control your stress and workload. Learn relaxation techniques. ? Share your feelings. Stress and tension affect our emotions. By expressing your feelings to others, you may be able to understand and cope with them. ? Consider joining a support group. Talking about a problem with your spouse, a good friend, or other people with similar problems is a good way to reduce tension and stress. ? Express yourself through art. Try writing, crafts, dance, or art to relieve stress. Some dance, writing, or art groups may be available just for people who have cancer. ? Be kind to your body and mind. Getting enough sleep, eating a healthy diet, and taking time to do things you enjoy can contribute to an overall feeling of balance in your life and can help reduce stress. ? Get help if you need it. Discuss your concerns with your doctor or counselor. · If you are vomiting or have diarrhea: ? Drink plenty of fluids (enough so that your urine is light yellow or clear like water) to prevent dehydration. Choose water and other caffeine-free clear liquids. If you have kidney, heart, or liver disease and have to limit fluids, talk with your doctor before you increase the amount of fluids you drink. ? When you are able to eat, try clear soups, mild foods, and liquids until all symptoms are gone for 12 to 48 hours. Other good choices include dry toast, crackers, cooked cereal, and gelatin dessert, such as Jell-O. · If you have not already done so, prepare a list of advance directives. Advance directives are instructions to your doctor and family members about what kind of care you want if you become unable to speak or express yourself. When should you call for help? Call 911 anytime you think you may need emergency care. For example, call if: 
  · You passed out (lost consciousness). Call your doctor now or seek immediate medical care if:   · You have a fever.  
  · You have abnormal bleeding.  
  · You think you have an infection.  
  · You have new or worse pain.  
  · You have new symptoms, such as a cough, belly pain, vomiting, diarrhea, or a rash. Watch closely for changes in your health, and be sure to contact your doctor if: 
  · You are much more tired than usual.  
  · You have swollen glands in your armpits, groin, or neck.  
  · You do not get better as expected. Where can you learn more? Go to http://hanna-dharmesh.info/ Enter V321 in the search box to learn more about \"Breast Cancer: Care Instructions. \" Current as of: April 29, 2020               Content Version: 12.6 © 2006-2020 [employer]. Care instructions adapted under license by [employer] (which disclaims liability or warranty for this information). If you have questions about a medical condition or this instruction, always ask your healthcare professional. [place] [address] any warranty or liability for your use of this information. Neuropathic Pain: Care Instructions Your Care Instructions Neuropathic pain is caused by pressure on or damage to your nerves. It's often simply called nerve pain. Some people feel this type of pain all the time. For others, it comes and goes. Diabetes, shingles, or an injury can cause nerve pain. Many people say the pain feels sharp, burning, or stabbing. But some people feel it as a dull ache. In some cases, it makes your skin very sensitive. So touch, pressure, and other sensations that did not hurt before may now cause pain. It's important to know that this kind of pain is real and can affect your quality of life. It's also important to know that treatment can help. Treatment includes pain medicines, exercise, and physical therapy.  
Medicines can help reduce the number of pain signals that travel over the nerves. This can make the painful areas less sensitive. It can also help you sleep better and improve your mood. But medicines are only one part of successful treatment. Most people do best with more than one kind of treatment. Your doctor may recommend that you try cognitive-behavioral therapy and stress management. Or, if needed, you may decide to try to quit smoking, lower your blood pressure, or better control blood sugar. These kinds of healthy changes can also make a difference. If you feel that your treatment is not working, talk to your doctor. And be sure to tell your doctor if you think you might be depressed or anxious. These are common problems that can also be treated. Follow-up care is a key part of your treatment and safety. Be sure to make and go to all appointments, and call your doctor if you are having problems. It's also a good idea to know your test results and keep a list of the medicines you take. How can you care for yourself at home? · Be safe with medicines. Read and follow all instructions on the label. ? If the doctor gave you a prescription medicine for pain, take it as prescribed. ? If you are not taking a prescription pain medicine, ask your doctor if you can take an over-the-counter medicine. · Save hard tasks for days when you have less pain. Follow a hard task with an easy task. And remember to take breaks. · Relax, and reduce stress. You may want to try deep breathing or meditation. These can help. · Keep moving. Gentle, daily exercise can help reduce pain. Your doctor or physical therapist can tell you what type of exercise is best for you. This may include walking, swimming, and stationary biking. It may also include stretches and range-of-motion exercises. · Try heat, cold packs, and massage. · Get enough sleep. Constant pain can make you more tired. If the pain makes it hard to sleep, talk with your doctor. · Think positively. Your thoughts can affect your pain. Do fun things to distract yourself from the pain. See a movie, read a book, listen to music, or spend time with a friend. · Keep a pain diary. Try to write down how strong your pain is and what it feels like. Also try to notice and write down how your moods, thoughts, sleep, activities, and medicine affect your pain. These notes can help you and your doctor find the best ways to treat your pain. Reducing constipation caused by pain medicine Pain medicines often cause constipation. To reduce constipation: 
· Include fruits, vegetables, beans, and whole grains in your diet each day. These foods are high in fiber. · Drink plenty of fluids, enough so that your urine is light yellow or clear like water. If you have kidney, heart, or liver disease and have to limit fluids, talk with your doctor before you increase the amount of fluids you drink. · Get some exercise every day. Build up slowly to 30 to 60 minutes a day on 5 or more days of the week. · Take a fiber supplement, such as Citrucel or Metamucil, every day if needed. Read and follow all instructions on the label. · Schedule time each day for a bowel movement. Having a daily routine may help. Take your time and do not strain when having a bowel movement. · Ask your doctor about a laxative. The goal is to have one easy bowel movement every 1 to 2 days. Do not let constipation go untreated for more than 3 days. When should you call for help? Call your doctor now or seek immediate medical care if: 
  · You feel sad, anxious, or hopeless for more than a few days. This could mean you are depressed. Depression is common in people who have a lot of pain. But it can be treated.  
  · You have trouble with bowel movements, such as: 
? No bowel movement in 3 days. ? Blood in the anal area, in your stool, or on the toilet paper. ? Diarrhea for more than 24 hours. Watch closely for changes in your health, and be sure to contact your doctor if: 
  · Your pain is getting worse.  
  · You can't sleep because of pain.  
  · You are very worried or anxious about your pain.  
  · You have trouble taking your pain medicine.  
  · You have any concerns about your pain medicine or its side effects.  
  · You have vomiting or cramps for more than 2 hours. Where can you learn more? Go to http://hanna-dharmesh.info/ Enter P540 in the search box to learn more about \"Neuropathic Pain: Care Instructions. \" Current as of: November 20, 2019               Content Version: 12.6 © 1386-7486 [employer]. Care instructions adapted under license by [employer] (which disclaims liability or warranty for this information). If you have questions about a medical condition or this instruction, always ask your healthcare professional. [address] any warranty or liability for your use of this information.

## 2020-09-30 NOTE — PROGRESS NOTES
Hematology/Oncology  Progress Note    Name: Ubaldo Woodard  Date: 2020  : 1960    Edna Estrada MD     Ms. Deborah Garcia is a 61y.o. 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 Deborah Garcia is a 63-year-old  woman who has a history of DCIS involving the left breast and invasive ductal adenocarcinoma of the right breast. She was diagnosed more than 5 years ago. The patient reports she has done fairly well since she completed her systemic chemotherapy. She also underwent bilateral mastectomies. She report chronic neuropathy with tingling and numbness. She also has chronic pain from osteoarthritis. The patient otherwise has no other complaints. Denied fever, chills, night sweat, unintentional weight loss, skin lumps or bumps, acute bleeding or bruising issues. Denied headache, acute vision change, dizziness, chest pain, worsen shortness of breath, palpitation, productive cough, nausea, vomiting, abdominal pain, altered bowel habits, dysuria, new bone pain or back pain, focal numbness or weakness. Independent with ADLs and IADLs. denies any breast issues or pain. Past medical history, family history, and social history: these were reviewed and remains unchanged.     Past Medical History:   Diagnosis Date    Cancer [facility])     right breast cancer     DDD (degenerative disc disease), cervical 2017    Hypercholesterolemia     Hypertension      Past Surgical History:   Procedure Laterality Date    HX BREAST RECONSTRUCTION  2014    BILATERAL BREAST RECONSTRUCTION WITH TISSUE EXPANDERS performed by Piper Palumbo MD at [address] [place] [address] HX  SECTION      HX CHOLECYSTECTOMY      HX GYN      fibroid tumors removed    HX MASTECTOMY  2014    BILATERAL BREAST MASTECTOMY SIMPLE performed by Raeann Hancock MD at [facility] MAIN OR    HX TUBAL LIGATION      HX VASCULAR ACCESS      left chest     Social History     Socioeconomic History    Marital status:      Spouse name: Not on file    Number of children: Not on file    Years of education: Not on file    Highest education level: Not on file   Occupational History    Not on file   Social Needs    Financial resource strain: Not on file    Food insecurity     Worry: Not on file     Inability: Not on file    Transportation needs     Medical: Not on file     Non-medical: Not on file   Tobacco Use    Smoking status: Former Smoker     Types: Cigarettes    Smokeless tobacco: Never Used    Tobacco comment: quit 6/2014   Substance and Sexual Activity    Alcohol use: No     Comment: Socially    Drug use: No    Sexual activity: Not Currently   Lifestyle    Physical activity     Days per week: Not on file     Minutes per session: Not on file    Stress: Not on file   Relationships    Social connections     Talks on phone: Not on file     Gets together: Not on file     Attends [employer] service: Not on file     Active member of club or organization: Not on file     Attends meetings of clubs or organizations: Not on file     Relationship status: Not on file    Intimate partner violence     Fear of current or ex partner: Not on file     Emotionally abused: Not on file     Physically abused: Not on file     Forced sexual activity: Not on file   Other Topics Concern    Not on file   Social History Narrative    Not on file     Family History   Problem Relation Age of Onset    Hypertension Mother     Diabetes Father     Diabetes Daughter     Diabetes Son      Current Outpatient Medications   Medication Sig Dispense Refill    gabapentin (NEURONTIN) 100 mg capsule Take 2 Caps by mouth three (3) times daily for 90 days. Max Daily Amount: 600 mg.  Indications: neuropathic pain 180 Cap 2    gabapentin (NEURONTIN) 100 mg capsule Take 1 Cap by mouth three (3) times daily. Max Daily Amount: 300 mg. 90 Cap 3    meloxicam (MOBIC) 15 mg tablet Take 1 Tab by mouth daily. 30 Tab 0    lovastatin (MEVACOR) 20 mg tablet Take 1 Tab by mouth daily. 90 Tab 1    triamterene-hydroCHLOROthiazide (DYAZIDE) 37.5-25 mg per capsule Take 1 Cap by mouth daily. 90 Cap 1    ferrous sulfate (IRON) 325 mg (65 mg iron) tablet Take 325 mg by mouth Daily (before breakfast). Indications: IRON DEFICIENCY ANEMIA         Review of Systems  Constitutional: The patient has no acute distress or discomfort. HEENT: The patient denies recent head trauma, eye pain, blurred vision,  hearing deficit, oropharyngeal mucosal pain or lesions, and the patient denies throat pain or discomfort. Lymphatics: The patient denies palpable peripheral lymphadenopathy. Hematologic: The patient denies having bruising, bleeding, or progressive fatigue. Respiratory: Patient denies having shortness of breath, cough, sputum production, fever, or dyspnea on exertion. Cardiovascular: The patient denies having leg pain, leg swelling, heart palpitations, chest permit, chest pain, or lightheadedness. The patient denies having dyspnea on exertion. Gastrointestinal: The patient denies having nausea, emesis, or diarrhea. The patient denies having any hematemesis or blood in the stool. Genitourinary: Patient denies having urinary urgency, frequency, or dysuria. The patient denies having blood in the urine. Psychological: The patient denies having symptoms of nervousness, anxiety, depression, or thoughts of harming himself some of this. Skin: Patient denies having skin rashes, skin, ulcerations, or unexplained itching or pruritus. Musculoskeletal: The patient denies having pain in the joints or bones. Objective:     Visit Vitals  BP (!) 146/85   Pulse 76   Resp 18   Ht 5' 4\" (1.626 m)   Wt 108.9 kg (240 lb)   SpO2 97%   BMI 41.20 kg/m²     ECOG PS0  Physical Exam:   Gen. Appearance: The patient is in no acute distress. Skin: There is no bruise or rash. HEENT: The exam is unremarkable. Neck: Supple without lymphadenopathy or thyromegaly. Lungs: Clear to auscultation and percussion; there are no wheezes or rhonchi. Heart: Regular rate and rhythm; there are no murmurs, gallops, or rubs. Chest Wall and Axilla: No palpable masses, no evidence of disease recurrence. S/p bilateral mastectomy. Abdomen: Bowel sounds are present and normal.  There is no guarding, tenderness, or hepatosplenomegaly. Extremities: There is no clubbing, cyanosis, or edema. Neurologic: There are no focal neurologic deficits. Lymphatics: There is no palpable peripheral lymphadenopathy. Musculoskeletal: The patient has full range of motion at all joints. There is no evidence of joint deformity or effusions. There is no focal joint tenderness. Psychological/psychiatric: There is no clinical evidence of anxiety, depression, or melancholy. Lab data:      Results for orders placed or performed during the hospital encounter of 01/21/20   CBC WITH 3 PART DIFF     Status: Abnormal   Result Value Ref Range Status    WBC 5.7 4.5 - 13.0 K/uL Final    RBC 5.22 (H) 4.10 - 5.10 M/uL Final    HGB 12.3 12.0 - 16.0 g/dL Final    HCT 40.2 36 - 48 % Final    MCV 77.0 (L) 78 - 102 FL Final    MCH 23.6 (L) 25.0 - 35.0 PG Final    MCHC 30.6 (L) 31 - 37 g/dL Final    RDW 13.6 11.5 - 14.5 % Final    PLATELET 590 419 - 059 K/uL Final    NEUTROPHILS 51 40 - 70 % Final    MIXED CELLS 9 0.1 - 17 % Final    LYMPHOCYTES 40 14 - 44 % Final    ABS. NEUTROPHILS 2.9 1.8 - 9.5 K/UL Final    ABS. MIXED CELLS 0.5 0.0 - 2.3 K/uL Final    ABS. LYMPHOCYTES 2.3 1.1 - 5.9 K/UL Final     Comment: Test performed at [address] [place] [address] or Outpatient Infusion Center Location. Reviewed by Medical Director. DF AUTOMATED   Final           Assessment:     1.  Invasive ductal carcinoma of right breast, stage 1 (HCC) 2. Chemotherapy-induced peripheral neuropathy ([facility] [address]    3. Triple negative malignant neoplasm of breast ([facility] [address]    4. Ductal carcinoma in situ (DCIS) of left breast    5. Chronic pain syndrome    6. Ductal carcinoma in situ (DCIS) of breast, unspecified laterality    7. Neuropathy          Plan:   DCIS left breast/ Invasive ductal carcinoma right breast   # Triple negative breast cancer  -- B/l Mastectomy 6/2014  -- The patient had completed adjuvant systemic chemotherapy, carboplatin and Taxotere. -- Since she had triple negative for cancer, she was not a candidate for antiestrogen therapy. -- Clinically the patient is doing well and has no evidence of disease recurrence.      Plan:  -- The patient was instructed to continue doing her chest wall and axilla exams on a monthly basis. -- The patient was advised to notify us if any breast pain, lumps or bumps, breast skin changes, unintentional weight loss, worsen fatigue, new bone pain or back pain, or any concerns. -- I have advised her to follow up her PCP to continue age-appropriate cancer screening. -- I have educated her regarding lifestyle modifications, minimizing alcohol intake, refraining from smoking, healthy diet, and physical activity. --  CBC, chemistry reviewed with patient, CA 2729 was 31.2 on 9/23/2020.         Chronic Anemia:   -- Her CBC showed her hemoglobin was normal at 12.5 g/dL with hematocrit of 43.4% on 9/23/2020   -- We will continue to monitor CBC at this time.      Chemotherapy-induced peripheral neuropathy:  -- She has been taking Neurontin 200mg PO TID. She also takes Percocet 5mg every 4-6 hours PRN for pain. -- We will refer her to pain management for long-term pain control. --WE will renew her Gabapentin today at this time. .      Chronic pain syndrome, Arthritis:   -- She has been taking the Percocet 5mg every 4-6 hours as well which she states is helping with her symptoms.    -- Pain management referal        -- We will see the patient back in clinic in about 3 months. Always sooner if required. The patient can have lab done prior our next clinic visit.     Orders Placed This Encounter    gabapentin (NEURONTIN) 100 mg capsule     Sig: Take 2 Caps by mouth three (3) times daily for 90 days. Max Daily Amount: 600 mg. Indications: neuropathic pain     Dispense:  180 Cap     Refill:  2       Eduardo Menezes NP  9/30/2020      Please note: This document has been produced using voice recognition software. Unrecognized errors in transcription may be present.

## 2021-01-18 ENCOUNTER — LAB ONLY (OUTPATIENT)
Dept: ONCOLOGY | Age: 61
End: 2021-01-18

## 2021-01-18 DIAGNOSIS — G62.9 NEUROPATHY: ICD-10-CM

## 2021-01-18 DIAGNOSIS — T45.1X5A CHEMOTHERAPY-INDUCED PERIPHERAL NEUROPATHY (HCC): Primary | ICD-10-CM

## 2021-01-18 DIAGNOSIS — C50.911 INVASIVE DUCTAL CARCINOMA OF RIGHT BREAST, STAGE 1 (HCC): ICD-10-CM

## 2021-01-18 DIAGNOSIS — C50.919 TRIPLE NEGATIVE MALIGNANT NEOPLASM OF BREAST (HCC): ICD-10-CM

## 2021-01-18 DIAGNOSIS — G62.0 CHEMOTHERAPY-INDUCED PERIPHERAL NEUROPATHY (HCC): Primary | ICD-10-CM

## 2021-01-18 DIAGNOSIS — D05.12 DUCTAL CARCINOMA IN SITU (DCIS) OF LEFT BREAST: ICD-10-CM

## 2021-01-19 LAB
ALBUMIN SERPL-MCNC: 4.1 G/DL (ref 3.8–4.8)
ALBUMIN/GLOB SERPL: 1 {RATIO} (ref 1.2–2.2)
ALP SERPL-CCNC: 90 IU/L (ref 39–117)
ALT SERPL-CCNC: 16 IU/L (ref 0–32)
AST SERPL-CCNC: 17 IU/L (ref 0–40)
BASOPHILS # BLD AUTO: 0 X10E3/UL (ref 0–0.2)
BASOPHILS NFR BLD AUTO: 1 %
BILIRUB SERPL-MCNC: 0.6 MG/DL (ref 0–1.2)
BUN SERPL-MCNC: 16 MG/DL (ref 8–27)
BUN/CREAT SERPL: 21 (ref 12–28)
CALCIUM SERPL-MCNC: 9.5 MG/DL (ref 8.7–10.3)
CANCER AG27-29 SERPL-ACNC: 30.8 U/ML (ref 0–38.6)
CHLORIDE SERPL-SCNC: 103 MMOL/L (ref 96–106)
CO2 SERPL-SCNC: 26 MMOL/L (ref 20–29)
CREAT SERPL-MCNC: 0.75 MG/DL (ref 0.57–1)
EOSINOPHIL # BLD AUTO: 0.4 X10E3/UL (ref 0–0.4)
EOSINOPHIL NFR BLD AUTO: 7 %
ERYTHROCYTE [DISTWIDTH] IN BLOOD BY AUTOMATED COUNT: 13.7 % (ref 11.7–15.4)
FERRITIN SERPL-MCNC: 917 NG/ML (ref 15–150)
GLOBULIN SER CALC-MCNC: 4.1 G/DL (ref 1.5–4.5)
GLUCOSE SERPL-MCNC: 93 MG/DL (ref 65–99)
HCT VFR BLD AUTO: 40.4 % (ref 34–46.6)
HGB BLD-MCNC: 12.8 G/DL (ref 11.1–15.9)
IMM GRANULOCYTES # BLD AUTO: 0 X10E3/UL (ref 0–0.1)
IMM GRANULOCYTES NFR BLD AUTO: 0 %
IRON SATN MFR SERPL: 52 % (ref 15–55)
IRON SERPL-MCNC: 104 UG/DL (ref 27–139)
LYMPHOCYTES # BLD AUTO: 2.2 X10E3/UL (ref 0.7–3.1)
LYMPHOCYTES NFR BLD AUTO: 38 %
MCH RBC QN AUTO: 23.4 PG (ref 26.6–33)
MCHC RBC AUTO-ENTMCNC: 31.7 G/DL (ref 31.5–35.7)
MCV RBC AUTO: 74 FL (ref 79–97)
MONOCYTES # BLD AUTO: 0.4 X10E3/UL (ref 0.1–0.9)
MONOCYTES NFR BLD AUTO: 7 %
NEUTROPHILS # BLD AUTO: 2.7 X10E3/UL (ref 1.4–7)
NEUTROPHILS NFR BLD AUTO: 47 %
PLATELET # BLD AUTO: 275 X10E3/UL (ref 150–450)
POTASSIUM SERPL-SCNC: 4 MMOL/L (ref 3.5–5.2)
PROT SERPL-MCNC: 8.2 G/DL (ref 6–8.5)
RBC # BLD AUTO: 5.46 X10E6/UL (ref 3.77–5.28)
SODIUM SERPL-SCNC: 143 MMOL/L (ref 134–144)
SPECIMEN STATUS REPORT, ROLRST: NORMAL
TIBC SERPL-MCNC: 201 UG/DL (ref 250–450)
UIBC SERPL-MCNC: 97 UG/DL (ref 118–369)
WBC # BLD AUTO: 5.7 X10E3/UL (ref 3.4–10.8)

## 2021-02-01 ENCOUNTER — OFFICE VISIT (OUTPATIENT)
Dept: ONCOLOGY | Age: 61
End: 2021-02-01
Payer: COMMERCIAL

## 2021-02-01 VITALS
BODY MASS INDEX: 41.52 KG/M2 | DIASTOLIC BLOOD PRESSURE: 83 MMHG | OXYGEN SATURATION: 100 % | HEIGHT: 64 IN | WEIGHT: 243.2 LBS | SYSTOLIC BLOOD PRESSURE: 141 MMHG | HEART RATE: 64 BPM | TEMPERATURE: 97.8 F

## 2021-02-01 DIAGNOSIS — G89.4 CHRONIC PAIN SYNDROME: ICD-10-CM

## 2021-02-01 DIAGNOSIS — G62.0 CHEMOTHERAPY-INDUCED PERIPHERAL NEUROPATHY (HCC): Primary | ICD-10-CM

## 2021-02-01 DIAGNOSIS — C50.919 TRIPLE NEGATIVE MALIGNANT NEOPLASM OF BREAST (HCC): ICD-10-CM

## 2021-02-01 DIAGNOSIS — G62.9 NEUROPATHY: ICD-10-CM

## 2021-02-01 DIAGNOSIS — D05.12 DUCTAL CARCINOMA IN SITU (DCIS) OF LEFT BREAST: ICD-10-CM

## 2021-02-01 DIAGNOSIS — C50.911 INVASIVE DUCTAL CARCINOMA OF RIGHT BREAST, STAGE 1 (HCC): ICD-10-CM

## 2021-02-01 DIAGNOSIS — T45.1X5A CHEMOTHERAPY-INDUCED PERIPHERAL NEUROPATHY (HCC): Primary | ICD-10-CM

## 2021-02-01 PROCEDURE — 99214 OFFICE O/P EST MOD 30 MIN: CPT | Performed by: INTERNAL MEDICINE

## 2021-02-01 NOTE — PATIENT INSTRUCTIONS
Breast Cancer: Care Instructions Your Care Instructions Breast cancer occurs when abnormal cells grow out of control in the breast. These cancer cells can spread within the breast, to nearby lymph nodes and other tissues, and to other parts of the body. Being treated for cancer can weaken your body, and you may feel very tired. Get the rest your body needs so you can feel better. Finding out that you have cancer is scary. You may feel many emotions and may need some help coping. Seek out family, friends, and counselors for support. You also can do things at home to make yourself feel better while you go through treatment. Call the [employer] (4-455.505.9262) or visit its website at [employer] for more information. Follow-up care is a key part of your treatment and safety. Be sure to make and go to all appointments, and call your doctor if you are having problems. It's also a good idea to know your test results and keep a list of the medicines you take. How can you care for yourself at home? · Take your medicines exactly as prescribed. Call your doctor if you think you are having a problem with your medicine. You may get medicine for nausea and vomiting if you have these side effects. · Follow your doctor's instructions to relieve pain. Pain from cancer and surgery can almost always be controlled. Use pain medicine when you first notice pain, before it becomes severe. · Eat healthy food. If you do not feel like eating, try to eat food that has protein and extra calories to keep up your strength and prevent weight loss. Drink liquid meal replacements for extra calories and protein. Try to eat your main meal early. · Get some physical activity every day, but do not get too tired. Keep doing the hobbies you enjoy as your energy allows. · Do not smoke. Smoking can make your cancer worse. If you need help quitting, talk to your doctor about stop-smoking programs and medicines. These can increase your chances of quitting for good. · Take steps to control your stress and workload. Learn relaxation techniques. ? Share your feelings. Stress and tension affect our emotions. By expressing your feelings to others, you may be able to understand and cope with them. ? Consider joining a support group. Talking about a problem with your spouse, a good friend, or other people with similar problems is a good way to reduce tension and stress. ? Express yourself through art. Try writing, crafts, dance, or art to relieve stress. Some dance, writing, or art groups may be available just for people who have cancer. ? Be kind to your body and mind. Getting enough sleep, eating a healthy diet, and taking time to do things you enjoy can contribute to an overall feeling of balance in your life and can help reduce stress. ? Get help if you need it. Discuss your concerns with your doctor or counselor. · If you are vomiting or have diarrhea: ? Drink plenty of fluids (enough so that your urine is light yellow or clear like water) to prevent dehydration. Choose water and other caffeine-free clear liquids. If you have kidney, heart, or liver disease and have to limit fluids, talk with your doctor before you increase the amount of fluids you drink. ? When you are able to eat, try clear soups, mild foods, and liquids until all symptoms are gone for 12 to 48 hours. Other good choices include dry toast, crackers, cooked cereal, and gelatin dessert, such as Jell-O. · If you have not already done so, prepare a list of advance directives. Advance directives are instructions to your doctor and family members about what kind of care you want if you become unable to speak or express yourself. When should you call for help? Call 911 anytime you think you may need emergency care. For example, call if: 
  · You passed out (lost consciousness). Call your doctor now or seek immediate medical care if: 
  · You have a fever.  
  · You have abnormal bleeding.  
  · You think you have an infection.  
  · You have new or worse pain.  
  · You have new symptoms, such as a cough, belly pain, vomiting, diarrhea, or a rash. Watch closely for changes in your health, and be sure to contact your doctor if: 
  · You are much more tired than usual.  
  · You have swollen glands in your armpits, groin, or neck.  
  · You do not get better as expected. Where can you learn more? Go to http://www.perez.com/ Enter V321 in the search box to learn more about \"Breast Cancer: Care Instructions. \" Current as of: April 29, 2020               Content Version: 12.6 © 1961-7788 [employer]. Care instructions adapted under license by [employer] (which disclaims liability or warranty for this information). If you have questions about a medical condition or this instruction, always ask your healthcare professional. [place] [address] any warranty or liability for your use of this information. Anemia: Care Instructions Your Care Instructions Anemia is a low level of red blood cells, which carry oxygen throughout your body. Many things can cause anemia. Lack of iron is one of the most common causes. Your body needs iron to make hemoglobin, a substance in red blood cells that carries oxygen from the lungs to your body's cells. Without enough iron, the body produces fewer and smaller red blood cells. As a result, your body's cells do not get enough oxygen, and you feel tired and weak. And you may have trouble concentrating. Bleeding is the most common cause of a lack of iron. You may have heavy menstrual bleeding or bleeding caused by conditions such as ulcers, hemorrhoids, or cancer. Regular use of aspirin or other anti-inflammatory medicines (such as ibuprofen) also can cause bleeding in some people. A lack of iron in your diet also can cause anemia, especially at times when the body needs more iron, such as during pregnancy, infancy, and the teen years. Your doctor may have prescribed iron pills. It may take several months of treatment for your iron levels to return to normal. Your doctor also may suggest that you eat foods that are rich in iron, such as meat and beans. There are many other causes of anemia. It is not always due to a lack of iron. Finding the specific cause of your anemia will help your doctor find the right treatment for you. Follow-up care is a key part of your treatment and safety. Be sure to make and go to all appointments, and call your doctor if you are having problems. It's also a good idea to know your test results and keep a list of the medicines you take. How can you care for yourself at home? · Take your medicines exactly as prescribed. Call your doctor if you think you are having a problem with your medicine. · If your doctor recommends iron pills, take them as directed: ? Try to take the pills on an empty stomach about 1 hour before or 2 hours after meals. But you may need to take iron with food to avoid an upset stomach. ? Do not take antacids or drink milk or caffeine drinks (such as coffee, tea, or cola) at the same time or within 2 hours of the time that you take your iron. They can make it hard for your body to absorb the iron. ? Vitamin C (from food or supplements) helps your body absorb iron. Try taking iron pills with a glass of orange juice or some other food that is high in vitamin C, such as citrus fruits. ? Iron pills may cause stomach problems, such as heartburn, nausea, diarrhea, constipation, and cramps. Be sure to drink plenty of fluids, and include fruits, vegetables, and fiber in your diet each day. Iron pills often make your bowel movements dark or green. ? If you forget to take an iron pill, do not take a double dose of iron the next time you take a pill. ? Keep iron pills out of the reach of small children. An overdose of iron can be very dangerous. · Follow your doctor's advice about eating iron-rich foods. These include red meat, shellfish, poultry, eggs, beans, raisins, whole-grain bread, and leafy green vegetables. · Steam vegetables to help them keep their iron content. When should you call for help? Call 911 anytime you think you may need emergency care. For example, call if: 
  · You have symptoms of a heart attack. These may include: 
? Chest pain or pressure, or a strange feeling in the chest. 
? Sweating. ? Shortness of breath. ? Nausea or vomiting. ? Pain, pressure, or a strange feeling in the back, neck, jaw, or upper belly or in one or both shoulders or arms. ? Lightheadedness or sudden weakness. ? A fast or irregular heartbeat. After you call 911, the  may tell you to chew 1 adult-strength or 2 to 4 low-dose aspirin. Wait for an ambulance. Do not try to drive yourself.  
  · You passed out (lost consciousness). Call your doctor now or seek immediate medical care if: 
  · You have new or increased shortness of breath.  
  · You are dizzy or lightheaded, or you feel like you may faint.  
  · Your fatigue and weakness continue or get worse.  
  · You have any abnormal bleeding, such as: 
? Nosebleeds. ? Vaginal bleeding that is different (heavier, more frequent, at a different time of the month) than what you are used to. 
? Bloody or black stools, or rectal bleeding. ? Bloody or pink urine. Watch closely for changes in your health, and be sure to contact your doctor if: 
  · You do not get better as expected. Where can you learn more? Go to http://www.[employer].com/ Enter R301 in the search box to learn more about \"Anemia: Care Instructions. \" Current as of: November 8, 2019               Content Version: 12.6 © 3918-2067 [employer]. Care instructions adapted under license by [employer] (which disclaims liability or warranty for this information). If you have questions about a medical condition or this instruction, always ask your healthcare professional. [address] any warranty or liability for your use of this information.

## 2021-02-01 NOTE — PROGRESS NOTES
Hematology/Oncology  Progress Note    Name: Ramses Chandler  Date: 2021  : 1960    Medardo Vogt MD     Ms. Cristopher Hamilton is a 64y.o. 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 Cristopher Hamilton is a 49-year-old  woman who has a history of DCIS involving the left breast and invasive ductal adenocarcinoma of the right breast. She was diagnosed more than 6 years ago. The patient reports she has done fairly well since she completed her systemic chemotherapy. She also underwent bilateral mastectomies. She report chronic neuropathy with tingling and numbness. She also has chronic pain from osteoarthritis. The patient otherwise has no other complaints. Denied fever, chills, night sweat, unintentional weight loss, skin lumps or bumps, acute bleeding or bruising issues. Denied headache, acute vision change, dizziness, chest pain, worsen shortness of breath, palpitation, productive cough, nausea, vomiting, abdominal pain, altered bowel habits, dysuria, new bone pain or back pain, focal numbness or weakness. Independent with ADLs and IADLs. denies any breast issues or pain. Past medical history, family history, and social history: these were reviewed and remains unchanged.     Past Medical History:   Diagnosis Date    Cancer [facility])     right breast cancer     DDD (degenerative disc disease), cervical 2017    Hypercholesterolemia     Hypertension      Past Surgical History:   Procedure Laterality Date    HX BREAST RECONSTRUCTION  2014    BILATERAL BREAST RECONSTRUCTION WITH TISSUE EXPANDERS performed by Epi Torres MD at [address] [place] [address] HX  SECTION      HX CHOLECYSTECTOMY      HX GYN      fibroid tumors removed    HX MASTECTOMY  2014    BILATERAL BREAST MASTECTOMY SIMPLE performed by Jak Bullock MD at [facility] MAIN OR    HX TUBAL LIGATION      HX VASCULAR ACCESS      left chest     Social History     Socioeconomic History    Marital status:      Spouse name: Not on file    Number of children: Not on file    Years of education: Not on file    Highest education level: Not on file   Occupational History    Not on file   Social Needs    Financial resource strain: Not on file    Food insecurity     Worry: Not on file     Inability: Not on file    Transportation needs     Medical: Not on file     Non-medical: Not on file   Tobacco Use    Smoking status: Former Smoker     Types: Cigarettes    Smokeless tobacco: Never Used    Tobacco comment: quit 6/2014   Substance and Sexual Activity    Alcohol use: No     Comment: Socially    Drug use: No    Sexual activity: Not Currently   Lifestyle    Physical activity     Days per week: Not on file     Minutes per session: Not on file    Stress: Not on file   Relationships    Social connections     Talks on phone: Not on file     Gets together: Not on file     Attends Scientologist service: Not on file     Active member of club or organization: Not on file     Attends meetings of clubs or organizations: Not on file     Relationship status: Not on file    Intimate partner violence     Fear of current or ex partner: Not on file     Emotionally abused: Not on file     Physically abused: Not on file     Forced sexual activity: Not on file   Other Topics Concern    Not on file   Social History Narrative    Not on file     Family History   Problem Relation Age of Onset    Hypertension Mother     Diabetes Father     Diabetes Daughter     Diabetes Son      Current Outpatient Medications   Medication Sig Dispense Refill    gabapentin (NEURONTIN) 100 mg capsule Take 1 Cap by mouth three (3) times daily. Max Daily Amount: 300 mg. 90 Cap 3    meloxicam (MOBIC) 15 mg tablet Take 1 Tab by mouth daily.  30 Tab 0    lovastatin (MEVACOR) 20 mg tablet Take 1 Tab by mouth daily. 90 Tab 1    triamterene-hydroCHLOROthiazide (DYAZIDE) 37.5-25 mg per capsule Take 1 Cap by mouth daily. 90 Cap 1    ferrous sulfate (IRON) 325 mg (65 mg iron) tablet Take 325 mg by mouth Daily (before breakfast). Indications: IRON DEFICIENCY ANEMIA         Review of Systems  Constitutional: The patient has no acute distress or discomfort. HEENT: The patient denies recent head trauma, eye pain, blurred vision,  hearing deficit, oropharyngeal mucosal pain or lesions, and the patient denies throat pain or discomfort. Lymphatics: The patient denies palpable peripheral lymphadenopathy. Hematologic: The patient denies having bruising, bleeding, or progressive fatigue. Respiratory: Patient denies having shortness of breath, cough, sputum production, fever, or dyspnea on exertion. Cardiovascular: The patient denies having leg pain, leg swelling, heart palpitations, chest permit, chest pain, or lightheadedness. The patient denies having dyspnea on exertion. Gastrointestinal: The patient denies having nausea, emesis, or diarrhea. The patient denies having any hematemesis or blood in the stool. Genitourinary: Patient denies having urinary urgency, frequency, or dysuria. The patient denies having blood in the urine. Psychological: The patient denies having symptoms of nervousness, anxiety, depression, or thoughts of harming himself some of this. Skin: Patient denies having skin rashes, skin, ulcerations, or unexplained itching or pruritus. Musculoskeletal: The patient denies having pain in the joints or bones. Objective:     Visit Vitals  BP (!) 141/83   Pulse 64   Temp 97.8 °F (36.6 °C)   Ht 5' 4\" (1.626 m)   Wt 110.3 kg (243 lb 3.2 oz)   SpO2 100%   BMI 41.75 kg/m²     ECOG PS0  Physical Exam:   Gen. Appearance: The patient is in no acute distress. Skin: There is no bruise or rash. HEENT: The exam is unremarkable.   Neck: Supple without lymphadenopathy or thyromegaly. Lungs: Clear to auscultation and percussion; there are no wheezes or rhonchi. Heart: Regular rate and rhythm; there are no murmurs, gallops, or rubs. Chest Wall and Axilla: No palpable masses, no evidence of disease recurrence. S/p bilateral mastectomy. Abdomen: Bowel sounds are present and normal.  There is no guarding, tenderness, or hepatosplenomegaly. Extremities: There is no clubbing, cyanosis, or edema. Neurologic: There are no focal neurologic deficits. Lymphatics: There is no palpable peripheral lymphadenopathy. Musculoskeletal: The patient has full range of motion at all joints. There is no evidence of joint deformity or effusions. There is no focal joint tenderness. Psychological/psychiatric: There is no clinical evidence of anxiety, depression, or melancholy. Lab data:      Results for orders placed or performed during the hospital encounter of 01/21/20   CBC WITH 3 PART DIFF     Status: Abnormal   Result Value Ref Range Status    WBC 5.7 4.5 - 13.0 K/uL Final    RBC 5.22 (H) 4.10 - 5.10 M/uL Final    HGB 12.3 12.0 - 16.0 g/dL Final    HCT 40.2 36 - 48 % Final    MCV 77.0 (L) 78 - 102 FL Final    MCH 23.6 (L) 25.0 - 35.0 PG Final    MCHC 30.6 (L) 31 - 37 g/dL Final    RDW 13.6 11.5 - 14.5 % Final    PLATELET 970 201 - 544 K/uL Final    NEUTROPHILS 51 40 - 70 % Final    MIXED CELLS 9 0.1 - 17 % Final    LYMPHOCYTES 40 14 - 44 % Final    ABS. NEUTROPHILS 2.9 1.8 - 9.5 K/UL Final    ABS. MIXED CELLS 0.5 0.0 - 2.3 K/uL Final    ABS. LYMPHOCYTES 2.3 1.1 - 5.9 K/UL Final     Comment: Test performed at [address] [place] [address] or Outpatient Infusion Center Location. Reviewed by Medical Director. DF AUTOMATED   Final           Assessment:     1. Chemotherapy-induced peripheral neuropathy ([facility]    2. Invasive ductal carcinoma of right breast, stage 1 (HCC)    3. Triple negative malignant neoplasm of breast ([address]    4.  Ductal carcinoma in situ (DCIS) of left breast    5. Chronic pain syndrome    6. Neuropathy          Plan:   DCIS left breast/ Invasive ductal carcinoma right breast   Triple negative breast cancer  -- B/l Mastectomy 6/2014  -- The patient had completed adjuvant systemic chemotherapy, carboplatin and Taxotere. -- Since she had triple negative for cancer, she was not a candidate for antiestrogen therapy. -- Clinically the patient is doing well and has no evidence of disease recurrence.      Plan:  -- The patient was instructed to continue doing her chest wall and axilla exams on a monthly basis. -- The patient was advised to notify us if any breast pain, lumps or bumps, breast skin changes, unintentional weight loss, worsen fatigue, new bone pain or back pain, or any concerns. -- I have advised her to follow up her PCP to continue age-appropriate cancer screening. -- I have educated her regarding lifestyle modifications, minimizing alcohol intake, refraining from smoking, healthy diet, and physical activity. --  CBC, chemistry reviewed with patient, CA 2729 was 30.8 on 01/18/2021.         Chronic Anemia:   -- Her CBC showed her hemoglobin was normal at 12.8 g/dL with hematocrit of 40.4% on 1/18/2021   -- We will continue to monitor CBC at this time.      Chemotherapy-induced peripheral neuropathy:  -- She has been taking Neurontin 200mg PO TID. She also takes Percocet 5mg every 4-6 hours PRN for pain. -- We will refer her to pain management for long-term pain control. --We will renew her Gabapentin on as needed basis       Chronic pain syndrome, Arthritis:   -- She has been taking the Percocet 5mg every 4-6 hours as well which she states is helping with her symptoms. -- Pain management referal        -- We will see the patient back in clinic in about 4 months. Always sooner if required. The patient can have lab done prior our next clinic visit.     No orders of the defined types were placed in this encounter.       [employer] TAMMI Brown NP  2/1/2021      Please note: This document has been produced using voice recognition software.  Unrecognized errors in transcription may be present.

## 2021-02-08 DIAGNOSIS — G62.0 CHEMOTHERAPY-INDUCED PERIPHERAL NEUROPATHY (HCC): ICD-10-CM

## 2021-02-08 DIAGNOSIS — T45.1X5A CHEMOTHERAPY-INDUCED PERIPHERAL NEUROPATHY (HCC): ICD-10-CM

## 2021-02-08 RX ORDER — GABAPENTIN 100 MG/1
100 CAPSULE ORAL 3 TIMES DAILY
Qty: 90 CAP | Refills: 3 | Status: SHIPPED | OUTPATIENT
Start: 2021-02-08 | End: 2021-03-05 | Stop reason: SDUPTHER

## 2021-02-22 ENCOUNTER — HOSPITAL ENCOUNTER (OUTPATIENT)
Dept: LAB | Age: 61
Discharge: HOME | End: 2021-02-22
Payer: COMMERCIAL

## 2021-02-22 PROCEDURE — U0003 INFECTIOUS AGENT DETECTION BY NUCLEIC ACID (DNA OR RNA); SEVERE ACUTE RESPIRATORY SYNDROME CORONAVIRUS 2 (SARS-COV-2) (CORONAVIRUS DISEASE [COVID-19]), AMPLIFIED PROBE TECHNIQUE, MAKING USE OF HIGH THROUGHPUT TECHNOLOGIES AS DESCRIBED BY CMS-2020-01-R: HCPCS

## 2021-02-23 LAB — SARS-COV-2, COV2NT: NOT DETECTED

## 2021-02-25 ENCOUNTER — ANESTHESIA EVENT (OUTPATIENT)
Dept: ENDOSCOPY | Age: 61
End: 2021-02-25
Payer: COMMERCIAL

## 2021-02-26 ENCOUNTER — HOSPITAL ENCOUNTER (OUTPATIENT)
Age: 61
Setting detail: OUTPATIENT SURGERY
Discharge: HOME | End: 2021-02-26
Attending: INTERNAL MEDICINE | Admitting: INTERNAL MEDICINE
Payer: COMMERCIAL

## 2021-02-26 ENCOUNTER — ANESTHESIA (OUTPATIENT)
Dept: ENDOSCOPY | Age: 61
End: 2021-02-26
Payer: COMMERCIAL

## 2021-02-26 VITALS
BODY MASS INDEX: 40.63 KG/M2 | RESPIRATION RATE: 16 BRPM | DIASTOLIC BLOOD PRESSURE: 92 MMHG | TEMPERATURE: 97.8 F | SYSTOLIC BLOOD PRESSURE: 128 MMHG | WEIGHT: 238 LBS | HEART RATE: 94 BPM | HEIGHT: 64 IN | OXYGEN SATURATION: 98 %

## 2021-02-26 PROCEDURE — 74011000250 HC RX REV CODE- 250: Performed by: ANESTHESIOLOGY

## 2021-02-26 PROCEDURE — 76060000031 HC ANESTHESIA FIRST 0.5 HR: Performed by: INTERNAL MEDICINE

## 2021-02-26 PROCEDURE — 74011250636 HC RX REV CODE- 250/636: Performed by: ANESTHESIOLOGY

## 2021-02-26 PROCEDURE — 77030008565 HC TBNG SUC IRR ERBE -B: Performed by: INTERNAL MEDICINE

## 2021-02-26 PROCEDURE — 74011000250 HC RX REV CODE- 250: Performed by: NURSE ANESTHETIST, CERTIFIED REGISTERED

## 2021-02-26 PROCEDURE — 00811 ANES LWR INTST NDSC NOS: CPT | Performed by: ANESTHESIOLOGY

## 2021-02-26 PROCEDURE — 74011250636 HC RX REV CODE- 250/636: Performed by: NURSE ANESTHETIST, CERTIFIED REGISTERED

## 2021-02-26 PROCEDURE — 2709999900 HC NON-CHARGEABLE SUPPLY: Performed by: INTERNAL MEDICINE

## 2021-02-26 PROCEDURE — 76040000019: Performed by: INTERNAL MEDICINE

## 2021-02-26 RX ORDER — PROPOFOL 10 MG/ML
INJECTION, EMULSION INTRAVENOUS AS NEEDED
Status: DISCONTINUED | OUTPATIENT
Start: 2021-02-26 | End: 2021-02-26 | Stop reason: HOSPADM

## 2021-02-26 RX ORDER — SODIUM CHLORIDE 0.9 % (FLUSH) 0.9 %
5-40 SYRINGE (ML) INJECTION AS NEEDED
Status: DISCONTINUED | OUTPATIENT
Start: 2021-02-26 | End: 2021-02-26 | Stop reason: HOSPADM

## 2021-02-26 RX ORDER — SODIUM CHLORIDE, SODIUM LACTATE, POTASSIUM CHLORIDE, CALCIUM CHLORIDE 600; 310; 30; 20 MG/100ML; MG/100ML; MG/100ML; MG/100ML
75 INJECTION, SOLUTION INTRAVENOUS CONTINUOUS
Status: DISCONTINUED | OUTPATIENT
Start: 2021-02-26 | End: 2021-02-26 | Stop reason: HOSPADM

## 2021-02-26 RX ORDER — LIDOCAINE HYDROCHLORIDE 20 MG/ML
INJECTION, SOLUTION EPIDURAL; INFILTRATION; INTRACAUDAL; PERINEURAL AS NEEDED
Status: DISCONTINUED | OUTPATIENT
Start: 2021-02-26 | End: 2021-02-26 | Stop reason: HOSPADM

## 2021-02-26 RX ORDER — SODIUM CHLORIDE 0.9 % (FLUSH) 0.9 %
5-40 SYRINGE (ML) INJECTION EVERY 8 HOURS
Status: DISCONTINUED | OUTPATIENT
Start: 2021-02-26 | End: 2021-02-26 | Stop reason: HOSPADM

## 2021-02-26 RX ORDER — LIDOCAINE HYDROCHLORIDE 10 MG/ML
0.1 INJECTION, SOLUTION EPIDURAL; INFILTRATION; INTRACAUDAL; PERINEURAL AS NEEDED
Status: DISCONTINUED | OUTPATIENT
Start: 2021-02-26 | End: 2021-02-26 | Stop reason: HOSPADM

## 2021-02-26 RX ORDER — INSULIN LISPRO 100 [IU]/ML
INJECTION, SOLUTION INTRAVENOUS; SUBCUTANEOUS ONCE
Status: DISCONTINUED | OUTPATIENT
Start: 2021-02-26 | End: 2021-02-26 | Stop reason: HOSPADM

## 2021-02-26 RX ADMIN — LIDOCAINE HYDROCHLORIDE 60 MG: 20 INJECTION, SOLUTION EPIDURAL; INFILTRATION; INTRACAUDAL; PERINEURAL at 10:02

## 2021-02-26 RX ADMIN — PROPOFOL 50 MG: 10 INJECTION, EMULSION INTRAVENOUS at 10:07

## 2021-02-26 RX ADMIN — PROPOFOL 100 MG: 10 INJECTION, EMULSION INTRAVENOUS at 10:02

## 2021-02-26 RX ADMIN — PROPOFOL 50 MG: 10 INJECTION, EMULSION INTRAVENOUS at 10:12

## 2021-02-26 RX ADMIN — FAMOTIDINE 20 MG: 10 INJECTION INTRAVENOUS at 09:48

## 2021-02-26 RX ADMIN — SODIUM CHLORIDE, SODIUM LACTATE, POTASSIUM CHLORIDE, AND CALCIUM CHLORIDE 75 ML/HR: 600; 310; 30; 20 INJECTION, SOLUTION INTRAVENOUS at 09:47

## 2021-02-26 NOTE — ANESTHESIA POSTPROCEDURE EVALUATION
Procedure(s):  COLONOSCOPY. MAC    Anesthesia Post Evaluation      Multimodal analgesia: multimodal analgesia used between 6 hours prior to anesthesia start to PACU discharge  Patient location during evaluation: PACU  Patient participation: complete - patient participated  Level of consciousness: awake and alert  Pain management: adequate  Airway patency: patent  Anesthetic complications: no  Cardiovascular status: acceptable and hemodynamically stable  Respiratory status: acceptable  Hydration status: acceptable  Post anesthesia nausea and vomiting:  controlled      INITIAL Post-op Vital signs:   Vitals Value Taken Time   /71 02/26/21 1038   Temp 36.8 °C (98.3 °F) 02/26/21 1023   Pulse 60 02/26/21 1040   Resp 20 02/26/21 1040   SpO2 100 % 02/26/21 1040   Vitals shown include unvalidated device data.

## 2021-02-26 NOTE — H&P
Chief Complaint: History of polyps    History of present illness: No complaints today.     PMH:   Past Medical History:   Diagnosis Date    Arthritis     Cancer [facility])     right breast cancer     DDD (degenerative disc disease), cervical 2017    Hypercholesterolemia     Hypertension     Neuropathy      Allergies: No Known Allergies  Medications:   Current Facility-Administered Medications:     lidocaine (PF) (XYLOCAINE) 10 mg/mL (1 %) injection 0.1 mL, 0.1 mL, SubCUTAneous, PRN, Leaf Angus, CRNA    lactated Ringers infusion, 75 mL/hr, IntraVENous, CONTINUOUS, Jadiel Angus, CRNA, Last Rate: 75 mL/hr at 21 0947, 75 mL/hr at 21 0947    sodium chloride (NS) flush 5-40 mL, 5-40 mL, IntraVENous, Q8H, Leaf Angus, CRNA    sodium chloride (NS) flush 5-40 mL, 5-40 mL, IntraVENous, PRN, Leaf Angus, CRNA    insulin lispro (HUMALOG) injection, , SubCUTAneous, ONCE, Leaf Angus, CRNA  FH:   Family History   Problem Relation Age of Onset    Hypertension Mother     Diabetes Father     Diabetes Daughter     Diabetes Son      Social:   Social History     Socioeconomic History    Marital status:      Spouse name: Not on file    Number of children: Not on file    Years of education: Not on file    Highest education level: Not on file   Tobacco Use    Smoking status: Former Smoker     Types: Cigarettes     Quit date: 2014     Years since quittin.7    Smokeless tobacco: Never Used   Substance and Sexual Activity    Alcohol use: Not Currently     Comment: Socially    Drug use: No    Sexual activity: Not Currently     Surgical H:   Past Surgical History:   Procedure Laterality Date    HX BREAST RECONSTRUCTION  2014    BILATERAL BREAST RECONSTRUCTION WITH TISSUE EXPANDERS performed by Ondina Camacho MD at [address] [place] [address] CHOLECYSTECTOMY      HX GYN      fibroid tumors removed    HX MASTECTOMY  2014    BILATERAL BREAST MASTECTOMY SIMPLE performed by Lane Smith MD at [facility] MAIN OR    HX TUBAL LIGATION      HX VASCULAR ACCESS      left chest pac- removed 2015       ROS: negative    Physical Exam:   Visit Vitals  /74   Pulse 78   Temp 97.8 °F (36.6 °C)   Resp 16   Ht 5' 4\" (1.626 m)   Wt 108 kg (238 lb)   SpO2 97%   BMI 40.85 kg/m²     General appearance: alert, no distress  Eyes: pupils equal and reactive, extraocular eye movements intact  Nodes: No gross adenopathy in neck. Skin: no spider angiomata, jaundice, palmar erythema   Respiratory: clear to auscultation bilaterally  Cardiovascular: regular heart rate, no murmurs, no JVD, normal rate and regular rhythm  Abdomen: soft, non-tender, liver not enlarged, spleen not palpable, no obvious ascites  Extremities: no muscle wasting, no gross arthritic changes  Neurologic: alert and oriented, cranial nerves grossly intact, no asterixis    Labs: No results found for this or any previous visit (from the past 24 hour(s)). Imp/ Plan: Will proceed with colonoscopy as planned. Risk benefits alternative including but not limited to infection, bleeding, perforation of viscous, allergic reaction and resultant morbidity and mortality was discussed. Chance of missing a significant lesion due to various reasons were discussed.       Philippe Mendoza MD  [facility], [facility]

## 2021-02-26 NOTE — ANESTHESIA PREPROCEDURE EVALUATION
Relevant Problems   CARDIOVASCULAR   (+) HTN (hypertension)      ENDOCRINE   (+) Arthritis   (+) Obesity, morbid (HCC)      HEMATOLOGY   (+) Anemia   (+) Chronic anemia      PERSONAL HX & FAMILY HX OF CANCER   (+) Breast cancer (HCC)       Anesthetic History   No history of anesthetic complications            Review of Systems / Medical History  Patient summary reviewed, nursing notes reviewed and pertinent labs reviewed    Pulmonary  Within defined limits                 Neuro/Psych   Within defined limits           Cardiovascular    Hypertension          Hyperlipidemia         GI/Hepatic/Renal               Comments: tubular adenoma of colon Endo/Other        Morbid obesity and cancer (right breast)     Other Findings            Physical Exam    Airway  Mallampati: II  TM Distance: 4 - 6 cm  Neck ROM: normal range of motion   Mouth opening: Normal     Cardiovascular    Rhythm: regular           Dental    Dentition: Poor dentition     Pulmonary  Breath sounds clear to auscultation               Abdominal  GI exam deferred       Other Findings            Anesthetic Plan    ASA: 3  Anesthesia type: MAC            Anesthetic plan and risks discussed with: Patient

## 2021-02-26 NOTE — DISCHARGE INSTRUCTIONS
Colonoscopy: What to Expect at [address] [place] [address]  After you have a colonoscopy, you will stay at the clinic for 1 to 2 hours until the medicines wear off. Then you can go home. But you will need to arrange for a ride. Your doctor will tell you when you can eat and do your other usual activities. Your doctor will talk to you about when you will need your next colonoscopy. Your doctor can help you decide how often you need to be checked. This will depend on the results of your test and your risk for colorectal cancer. After the test, you may be bloated or have gas pains. You may need to pass gas. If a biopsy was done or a polyp was removed, you may have streaks of blood in your stool (feces) for a few days. This care sheet gives you a general idea about how long it will take for you to recover. But each person recovers at a different pace. Follow the steps below to get better as quickly as possible. How can you care for yourself at home? Activity  · Rest when you feel tired. · You can do your normal activities when it feels okay to do so. Diet  · Follow your doctor's directions for eating. · Unless your doctor has told you not to, drink plenty of fluids. This helps to replace the fluids that were lost during the colon prep. · Do not drink alcohol. Medicines  · If polyps were removed or a biopsy was done during the test, your doctor may tell you not to take aspirin or other anti-inflammatory medicines for a few days. These include ibuprofen (Advil, Motrin) and naproxen (Aleve). Other instructions  · For your safety, do not drive or operate machinery until the medicine wears off and you can think clearly. Your doctor may tell you not to drive or operate machinery until the day after your test.  · Do not sign legal documents or make major decisions until the medicine wears off and you can think clearly. The anesthesia can make it hard for you to fully understand what you are agreeing to.   Follow-up care is a key part of your treatment and safety. Be sure to make and go to all appointments, and call your doctor if you are having problems. It's also a good idea to know your test results and keep a list of the medicines you take. When should you call for help? Call 911 anytime you think you may need emergency care. For example, call if:  · You passed out (lost consciousness). · You pass maroon or bloody stools. · You have severe belly pain. Call your doctor now or seek immediate medical care if:  · Your stools are black and tarlike. · Your stools have streaks of blood, but you did not have a biopsy or any polyps removed. · You have belly pain, or your belly is swollen and firm. · You vomit. · You have a fever. · You are very dizzy. Watch closely for changes in your health, and be sure to contact your doctor if you have any problems. Where can you learn more? Go to [employer]  Enter E264 in the search box to learn more about \"Colonoscopy: What to Expect at Home. \"   © 8940-4142 [employer]. Care instructions adapted under license by [facility] (which disclaims liability or warranty for this information). This care instruction is for use with your licensed healthcare professional. If you have questions about a medical condition or this instruction, always ask your healthcare professional. [address] any warranty or liability for your use of this information. Content Version: 68.0.614605; Current as of: November 14, 2014    Patient Education      Diverticulosis: Care Instructions  Your Care Instructions  In diverticulosis, pouches called diverticula form in the wall of the large intestine (colon). The pouches do not cause any pain or other symptoms. Most people who have diverticulosis do not know they have it. But the pouches sometimes bleed, and if they become infected, they can cause pain and other symptoms.  When this happens, it is called diverticulitis. Diverticula form when pressure pushes the wall of the colon outward at certain weak points. A diet that is too low in fiber can cause diverticula. Follow-up care is a key part of your treatment and safety. Be sure to make and go to all appointments, and call your doctor if you are having problems. It's also a good idea to know your test results and keep a list of the medicines you take. How can you care for yourself at home? · Include fruits, leafy green vegetables, beans, and whole grains in your diet each day. These foods are high in fiber. · Take a fiber supplement, such as Citrucel or Metamucil, every day if needed. Read and follow all instructions on the label. · Drink plenty of fluids, enough so that your urine is light yellow or clear like water. If you have kidney, heart, or liver disease and have to limit fluids, talk with your doctor before you increase the amount of fluids you drink. · Get at least 30 minutes of exercise on most days of the week. Walking is a good choice. You also may want to do other activities, such as running, swimming, cycling, or playing tennis or team sports. · Cut out foods that cause gas, pain, or other symptoms. When should you call for help? Call your doctor now or seek immediate medical care if:    · You have belly pain.     · You pass maroon or very bloody stools.     · You have a fever.     · You have nausea and vomiting.     · You have unusual changes in your bowel movements or abdominal swelling.     · You have burning pain when you urinate.     · You have abnormal vaginal discharge.     · You have shoulder pain.     · You have cramping pain that does not get better when you have a bowel movement or pass gas.     · You pass gas or stool from your urethra while urinating. Watch closely for changes in your health, and be sure to contact your doctor if you have any problems. Where can you learn more?   Go to http://www.gray.com/  Enter [id number] in the search box to learn more about \"Diverticulosis: Care Instructions. \"  Current as of: April 15, 2020               Content Version: 12.6  © 2006-2020 [employer]. Care instructions adapted under license by [employer] (which disclaims liability or warranty for this information). If you have questions about a medical condition or this instruction, always ask your healthcare professional. [place] [address] any warranty or liability for your use of this information. Patient Education        Hemorrhoids: Care Instructions  Your Care Instructions     Hemorrhoids are enlarged veins that develop in the anal canal. Bleeding during bowel movements, itching, swelling, and rectal pain are the most common symptoms. They can be uncomfortable at times, but hemorrhoids rarely are a serious problem. You can treat most hemorrhoids with simple changes to your diet and bowel habits. These changes include eating more fiber and not straining to pass stools. Most hemorrhoids do not need surgery or other treatment unless they are very large and painful or bleed a lot. Follow-up care is a key part of your treatment and safety. Be sure to make and go to all appointments, and call your doctor if you are having problems. It's also a good idea to know your test results and keep a list of the medicines you take. How can you care for yourself at home? · Sit in a few inches of warm water (sitz bath) 3 times a day and after bowel movements. The warm water helps with pain and itching. · Put ice on your anal area several times a day for 10 minutes at a time. Put a thin cloth between the ice and your skin. Follow this by placing a warm, wet towel on the area for another 10 to 20 minutes. · Take pain medicines exactly as directed. ? If the doctor gave you a prescription medicine for pain, take it as prescribed.   ? If you are not taking a prescription pain medicine, ask your doctor if you can take an over-the-counter medicine. · Keep the anal area clean, but be gentle. Use water and a fragrance-free soap, such as [place], or use baby wipes or medicated pads, such as Tucks. · Wear cotton underwear and loose clothing to decrease moisture in the anal area. · Eat more fiber. Include foods such as whole-grain breads and cereals, raw vegetables, raw and dried fruits, and beans. · Drink plenty of fluids, enough so that your urine is light yellow or clear like water. If you have kidney, heart, or liver disease and have to limit fluids, talk with your doctor before you increase the amount of fluids you drink. · Use a stool softener that contains bran or psyllium. You can save money by buying bran or psyllium (available in bulk at most health food stores) and sprinkling it on foods or stirring it into fruit juice. Or you can use a product such as Metamucil or Hydrocil. · Practice healthy bowel habits. ? Go to the bathroom as soon as you have the urge. ? Avoid straining to pass stools. Relax and give yourself time to let things happen naturally. ? Do not hold your breath while passing stools. ? Do not read while sitting on the toilet. Get off the toilet as soon as you have finished. · Take your medicines exactly as prescribed. Call your doctor if you think you are having a problem with your medicine. When should you call for help? Call 911 anytime you think you may need emergency care. For example, call if:    · You pass maroon or very bloody stools. Call your doctor now or seek immediate medical care if:    · You have increased pain.     · You have increased bleeding. Watch closely for changes in your health, and be sure to contact your doctor if:    · Your symptoms have not improved after 3 or 4 days. Where can you learn more?   Go to http://www.gray.com/  Enter F228 in the search box to learn more about \"Hemorrhoids: Care Instructions. \"  Current as of: April 15, 2020               Content Version: 12.6  © 5396-8415 [employer]. Care instructions adapted under license by [employer] (which disclaims liability or warranty for this information). If you have questions about a medical condition or this instruction, always ask your healthcare professional. [address] any warranty or liability for your use of this information. DISCHARGE SUMMARY from Nurse     POST-PROCEDURE INSTRUCTIONS:    Call your Physician if you:  ? Observe any excess bleeding. ? Develop a temperature over 100.5o F.  ? Experience abdominal, shoulder or chest pain. ? Notice any signs of decreased circulation or nerve impairment to an extremity such as a change in color, persistent numbness, tingling, coldness or increase in pain. ? Vomit blood or you have nausea and vomiting lasting longer than 4 hours. ? Are unable to take medications. ? Are unable to urinate within 8 hours after discharge following general anesthesia or intravenous sedation. For the next 24 hours after receiving general anesthesia or intravenous sedation, or while taking prescription Narcotics, limit your activities:  ? Do NOT drive a motor vehicle, operate hazard machinery or power tools, or perform tasks that require coordination. The medication you received during your procedure may have some effect on your mental awareness. ? Do NOT make important personal or business decisions. The medication you received during your procedure may have some effect on your mental awareness. ? Do NOT drink alcoholic beverages. These drinks do not mix well with the medications that have been given to you. ? Upon discharge from the hospital, you must be accompanied by a responsible adult. ? Resume your diet as directed by your physician. ? Resume medications as your physician has prescribed. ?  Please give a list of your current medications to your Primary Care Provider. ? Please update this list whenever your medications are discontinued, doses are changed, or new medications (including over-the-counter products) are added. ? Please carry medication information at all times in case of emergency situations. These are general instructions for a healthy lifestyle:    No smoking/ No tobacco products/ Avoid exposure to second hand smoke.  Surgeon General's Warning:  Quitting smoking now greatly reduces serious risk to your health. Obesity, smoking, and a sedentary lifestyle greatly increase your risk for illness.  A healthy diet, regular physical exercise & weight monitoring are important for maintaining a healthy lifestyle   You may be retaining fluid if you have a history of heart failure or if you experience any of the following symptoms:  Weight gain of 3 pounds or more overnight or 5 pounds in a week, increased swelling in our hands or feet or shortness of breath while lying flat in bed. Please call your doctor as soon as you notice any of these symptoms; do not wait until your next office visit. Recognize signs and symptoms of STROKE:  F  -  Face looks uneven  A  -  Arms unable to move or move unevenly  S  -  Speech slurred or non-existent  T  -  Time to call 911 - as soon as signs and symptoms begin - DO NOT go back to bed or wait to see If you get better - TIME IS BRAIN. Colorectal Screening   Colorectal cancer almost always develops from precancerous polyps (abnormal growths) in the colon or rectum. Screening tests can find precancerous polyps, so that they can be removed before they turn into cancer. Screening tests can also find colorectal cancer early, when treatment works best.  [facility] Speak with your physician about when you should begin screening and how often you should be tested. Additional Information    If you have questions, please call [phone].  Remember, [employer] is NOT to be used for urgent needs. For medical emergencies, dial 911. Educational references and/or instructions provided during this visit included:    See attached. APPOINTMENTS:    Follow up as needed. Discharge information has been reviewed with the patient. The patient verbalized understanding.

## 2021-03-02 DIAGNOSIS — T45.1X5A CHEMOTHERAPY-INDUCED PERIPHERAL NEUROPATHY (HCC): ICD-10-CM

## 2021-03-02 DIAGNOSIS — G62.0 CHEMOTHERAPY-INDUCED PERIPHERAL NEUROPATHY (HCC): ICD-10-CM

## 2021-03-05 DIAGNOSIS — G62.0 CHEMOTHERAPY-INDUCED PERIPHERAL NEUROPATHY (HCC): ICD-10-CM

## 2021-03-05 DIAGNOSIS — T45.1X5A CHEMOTHERAPY-INDUCED PERIPHERAL NEUROPATHY (HCC): ICD-10-CM

## 2021-03-05 RX ORDER — GABAPENTIN 100 MG/1
200 CAPSULE ORAL 3 TIMES DAILY
Qty: 180 CAP | Refills: 2 | Status: SHIPPED | OUTPATIENT
Start: 2021-03-05 | End: 2021-07-29 | Stop reason: SDUPTHER

## 2021-03-05 RX ORDER — GABAPENTIN 100 MG/1
200 CAPSULE ORAL 3 TIMES DAILY
Qty: 180 CAP | Refills: 2 | OUTPATIENT
Start: 2021-03-05 | End: 2021-06-03

## 2021-05-19 ENCOUNTER — LAB ONLY (OUTPATIENT)
Dept: ONCOLOGY | Age: 61
End: 2021-05-19

## 2021-05-19 DIAGNOSIS — D05.12 DUCTAL CARCINOMA IN SITU (DCIS) OF LEFT BREAST: ICD-10-CM

## 2021-05-19 DIAGNOSIS — G62.0 CHEMOTHERAPY-INDUCED PERIPHERAL NEUROPATHY (HCC): Primary | ICD-10-CM

## 2021-05-19 DIAGNOSIS — T45.1X5A CHEMOTHERAPY-INDUCED PERIPHERAL NEUROPATHY (HCC): Primary | ICD-10-CM

## 2021-05-19 DIAGNOSIS — C50.911 INVASIVE DUCTAL CARCINOMA OF RIGHT BREAST, STAGE 1 (HCC): ICD-10-CM

## 2021-05-19 DIAGNOSIS — C50.919 TRIPLE NEGATIVE MALIGNANT NEOPLASM OF BREAST (HCC): ICD-10-CM

## 2021-05-20 LAB
ALBUMIN SERPL-MCNC: 3.9 G/DL (ref 3.8–4.8)
ALBUMIN/GLOB SERPL: 1.1 {RATIO} (ref 1.2–2.2)
ALP SERPL-CCNC: 88 IU/L (ref 48–121)
ALT SERPL-CCNC: 17 IU/L (ref 0–32)
AST SERPL-CCNC: 23 IU/L (ref 0–40)
BASOPHILS # BLD AUTO: 0 X10E3/UL (ref 0–0.2)
BASOPHILS NFR BLD AUTO: 1 %
BILIRUB SERPL-MCNC: 0.4 MG/DL (ref 0–1.2)
BUN SERPL-MCNC: 13 MG/DL (ref 8–27)
BUN/CREAT SERPL: 17 (ref 12–28)
CALCIUM SERPL-MCNC: 9.3 MG/DL (ref 8.7–10.3)
CANCER AG27-29 SERPL-ACNC: 24.2 U/ML (ref 0–38.6)
CHLORIDE SERPL-SCNC: 101 MMOL/L (ref 96–106)
CO2 SERPL-SCNC: 23 MMOL/L (ref 20–29)
CREAT SERPL-MCNC: 0.75 MG/DL (ref 0.57–1)
EOSINOPHIL # BLD AUTO: 0.4 X10E3/UL (ref 0–0.4)
EOSINOPHIL NFR BLD AUTO: 7 %
ERYTHROCYTE [DISTWIDTH] IN BLOOD BY AUTOMATED COUNT: 13.6 % (ref 11.7–15.4)
FERRITIN SERPL-MCNC: 820 NG/ML (ref 15–150)
GLOBULIN SER CALC-MCNC: 3.5 G/DL (ref 1.5–4.5)
GLUCOSE SERPL-MCNC: 89 MG/DL (ref 65–99)
HCT VFR BLD AUTO: 41 % (ref 34–46.6)
HGB BLD-MCNC: 12.5 G/DL (ref 11.1–15.9)
IMM GRANULOCYTES # BLD AUTO: 0 X10E3/UL (ref 0–0.1)
IMM GRANULOCYTES NFR BLD AUTO: 0 %
IRON SATN MFR SERPL: 37 % (ref 15–55)
IRON SERPL-MCNC: 79 UG/DL (ref 27–139)
LYMPHOCYTES # BLD AUTO: 2 X10E3/UL (ref 0.7–3.1)
LYMPHOCYTES NFR BLD AUTO: 35 %
MCH RBC QN AUTO: 23.1 PG (ref 26.6–33)
MCHC RBC AUTO-ENTMCNC: 30.5 G/DL (ref 31.5–35.7)
MCV RBC AUTO: 76 FL (ref 79–97)
MONOCYTES # BLD AUTO: 0.4 X10E3/UL (ref 0.1–0.9)
MONOCYTES NFR BLD AUTO: 7 %
NEUTROPHILS # BLD AUTO: 2.8 X10E3/UL (ref 1.4–7)
NEUTROPHILS NFR BLD AUTO: 50 %
PLATELET # BLD AUTO: 289 X10E3/UL (ref 150–450)
POTASSIUM SERPL-SCNC: 3.7 MMOL/L (ref 3.5–5.2)
PROT SERPL-MCNC: 7.4 G/DL (ref 6–8.5)
RBC # BLD AUTO: 5.41 X10E6/UL (ref 3.77–5.28)
SODIUM SERPL-SCNC: 140 MMOL/L (ref 134–144)
TIBC SERPL-MCNC: 213 UG/DL (ref 250–450)
UIBC SERPL-MCNC: 134 UG/DL (ref 118–369)
WBC # BLD AUTO: 5.6 X10E3/UL (ref 3.4–10.8)

## 2021-05-30 NOTE — PROGRESS NOTES
Hematology/Oncology  Progress Note    Name: Collin Walter  Date: 2021  : 1960    PCP: Lola Dowd MD       Ms. Bridgette Spann is a 64 y.o.  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  -- B/l Mastectomy 2014  -- The patient completed adjuvant systemic chemotherapy  -- Presently on surveillance    Subjective:     Ms. Bridgette Spann is a 42-year-old  woman who has a history of DCIS involving the left breast and invasive ductal adenocarcinoma of the right breast. She was diagnosed more than 5 years ago. The patient reports she has done fairly well since she completed her systemic chemotherapy. She also underwent bilateral mastectomies. She decided not to proceed with reconstruction. She is requesting for a new prescription of her percocet for her chronic neuropathy with tingling and numbness. She also has chronic pain from osteoarthritis. She was agreeable for pain management referral.  She reported right breast discomfort every now and then which occurred since her last surgery; however she denied any new breast issues or worsening pain. She admitted she sometimes forgot to check breast exam at monthly routine. The patient otherwise has no other complaints. Denied fever, chills, night sweat, unintentional weight loss, skin lumps or bumps, acute bleeding or bruising issues. Denied headache, acute vision change, dizziness, chest pain, worsen shortness of breath, palpitation, productive cough, nausea, vomiting, abdominal pain, altered bowel habits, dysuria, new bone pain or back pain, focal numbness or weakness. Past medical history, family history, and social history: these were reviewed and remains unchanged.     Past Medical History:   Diagnosis Date    Arthritis     Cancer [facility])     right breast cancer     DDD (degenerative disc disease), cervical 2017    Hypercholesterolemia     Hypertension     Neuropathy Past Surgical History:   Procedure Laterality Date    COLONOSCOPY N/A 2021    COLONOSCOPY performed by Andria Platt MD at [address] HX BREAST RECONSTRUCTION  2014    BILATERAL BREAST RECONSTRUCTION WITH TISSUE EXPANDERS performed by Pat Gray MD at [facility] MAIN OR     [address]      HX CHOLECYSTECTOMY      HX GYN      fibroid tumors removed    HX MASTECTOMY  2014    BILATERAL BREAST MASTECTOMY SIMPLE performed by Roseanne Gregorio MD at [facility] MA[facility] OR    HX TUBAL LIGATION      HX VASCULAR ACCESS      left chest pac- removed      Social History     Socioeconomic History    Marital status:      Spouse name: Not on file    Number of children: Not on file    Years of education: Not on file    Highest education level: Not on file   Occupational History    Not on file   Tobacco Use    Smoking status: Former Smoker     Types: Cigarettes     Quit date: 2014     Years since quittin.0    Smokeless tobacco: Never Used   Vaping Use    Vaping Use: Never used   Substance and Sexual Activity    Alcohol use: Not Currently     Comment: Socially    Drug use: No    Sexual activity: Not Currently   Other Topics Concern    Not on file   Social History Narrative    Not on file     Social Determinants of Health     Financial Resource Strain:     Difficulty of Paying Living Expenses:    Food Insecurity:     Worried About Running Out of Food in the Last Year:     Ran Out of Food in the Last Year:    Transportation Needs:     Lack of Transportation (Medical):      Lack of Transportation (Non-Medical):    Physical Activity:     Days of Exercise per Week:     Minutes of Exercise per Session:    Stress:     Feeling of Stress :    Social Connections:     Frequency of Communication with Friends and Family:     Frequency of Social Gatherings with Friends and Family:     Attends Confucianist Services:     Active Member of Clubs or Organizations:     Attends Club or Organization Meetings:     Marital Status:    Intimate Partner Violence:     Fear of Current or Ex-Partner:     Emotionally Abused:     Physically Abused:     Sexually Abused:      Family History   Problem Relation Age of Onset    Hypertension Mother     Diabetes Father     Diabetes Daughter     Diabetes Son      Current Outpatient Medications   Medication Sig Dispense Refill    gabapentin (NEURONTIN) 100 mg capsule Take 2 Caps by mouth three (3) times daily for 90 days. Max Daily Amount: 600 mg. Indications: neuropathic pain 180 Cap 2    meloxicam (MOBIC) 15 mg tablet Take 1 Tab by mouth daily. 30 Tab 0    lovastatin (MEVACOR) 20 mg tablet Take 1 Tab by mouth daily. 90 Tab 1    triamterene-hydroCHLOROthiazide (DYAZIDE) 37.5-25 mg per capsule Take 1 Cap by mouth daily. 90 Cap 1       Review of Systems   Constitutional: Negative for chills, diaphoresis, fever, malaise/fatigue and weight loss. Respiratory: Negative for cough, hemoptysis, shortness of breath and wheezing. Cardiovascular: Negative for chest pain, palpitations and leg swelling. Gastrointestinal: Negative for abdominal pain, diarrhea, heartburn, nausea and vomiting. Genitourinary: Negative for dysuria, frequency, hematuria and urgency. Musculoskeletal: Negative for joint pain and myalgias. Skin: Negative for itching and rash. Neurological: Negative for dizziness, tingling, seizures, weakness and headaches. Psychiatric/Behavioral: Negative for depression. The patient does not have insomnia.              Objective:     Visit Vitals  BP (!) 145/89   Pulse (!) 59   Temp 98 °F (36.7 °C) (Temporal)   Resp 16   Ht 5' 4\" (1.626 m)   Wt 106.1 kg (234 lb)   SpO2 96%   BMI 40.17 kg/m²       ECOG Performance Status (grade): 0  0 - able to carry on all pre-disease activity w/out restriction  1 - restricted but able to carry out light work  2 - ambulatory and can self- care but unable to carry out work  3 - bed or chair >50% of waking hours  4 - completely disable, total care, confined to bed or chair    Physical Exam  Constitutional:       Appearance: Normal appearance. HENT:      Head: Normocephalic and atraumatic. Eyes:      Pupils: Pupils are equal, round, and reactive to light. Cardiovascular:      Rate and Rhythm: Normal rate and regular rhythm. Heart sounds: Normal heart sounds. Pulmonary:      Effort: Pulmonary effort is normal.      Breath sounds: Normal breath sounds. Abdominal:      General: Bowel sounds are normal.      Palpations: Abdomen is soft. Tenderness: There is no abdominal tenderness. There is no guarding. Musculoskeletal:         General: Normal range of motion. Cervical back: Neck supple. Right lower leg: No edema. Left lower leg: No edema. Skin:     General: Skin is warm. Neurological:      General: No focal deficit present. Mental Status: She is alert and oriented to person, place, and time. Mental status is at baseline. Anterior chest wall and breast: Clinically there is no evidence of disease recurrence. Examination of the chest wall and breast shows no mass, no tenderness or abnormality. The axilla reveals no palpable axillary lymphadenopathy. Diagnostics:      No results found for this or any previous visit (from the past 96 hour(s)). Imaging:  No results found for this or any previous visit. Results for orders placed during the hospital encounter of 06/26/17    XR SPINE CERV PA LAT ODONT 3 V MAX    Narrative  Cervical Spine AP And Lateral    CPT CODE: 29537    HISTORY: , neck pain, left arm pain with numbness and tingling. COMPARISON: None. FINDINGS:    Two views obtained. The lateral view demonstrates from skull base to T1. The  vertebra are normal in height and alignment. The disc spaces are moderately  narrowed from C3 through C6 with ventral spurring.   The prevertebral soft  tissues are normal.  There is no evidence of fracture or dislocation. Impression  IMPRESSION:    Multilevel moderate degenerative disc disease of the mid to lower cervical spine      Results for orders placed during the hospital encounter of 07/08/14    CT CHEST ABD PELV W CONT    Narrative  CT  CHEST, ABDOMEN AND PELVIS WITH CONTRAST    CPT CODE: 54486, [id number], [id number]    INDICATION: New diagnosis breast cancer, staging, BREAST CANCER. COMPARISON: Mammogram 6/10/14. TECHNIQUE:  Standard helical images were obtained from the thoracic inlet  through the inferior pubic rami at 5 mm thick sections following the  intravenous injection of a bolus of  100 cc nonionic contrast.  Images were  reviewed on both soft tissue, lung, and bone window settings. Coronal and  sagittal reformations obtained. CTDIvol DLP 1080.37 CTDIvol mGy    CT CHEST FINDINGS:    The included portion of the thyroid is demonstrates a bilateral tiny  intrathyroid hypodensity measuring less than 5 mm, image 2 series 2..  Minimal micronodular thickening of the left major fissure at the inferior hilar  level, image 28 series 4. There is no evidence of mediastinal, hilar, nor axillary adenopathy. The great vessels and thoracic aorta are unremarkable. There are no pleural effusions. The breasts are incompletely included on this exam. Asymmetrical soft tissue  densities are seen within the medial breasts bilaterally. CT ABDOMEN FINDINGS:    The liver and spleen are normal in size and density. The biliary tree is not  dilated. There is bilateral renal function without obstruction, without cyst, stone or  mass. Adrenals and Pancreas  are of normal CT appearance. There is no free fluid or free air. The large and small bowel seem unremarkable. There are several minimally enlarged periportal nodes which are nonspecific. 1.5 x 1 cm node image 74 series 2. Portacaval node measures 1.2 x 0.6 cm. Image  75 series 2. CT PELVIS FINDINGS:    There is no free pelvic fluid.   The uterus is not enlarged. There are however several small hypodense masses  within the myometrium. No abnormality of the ovaries. The bladder is incompletely distended but unremarkable. There is no significant adenopathy. Posterior articular facet disease of the lower lumbar spine noted. Impression  :    Several minimally enlarged periportal/peter hepatis lymph nodes are  nonspecific. Followup in 6 months may be of benefit as clinically indicated. Probable small uterine fibroids or adenomyosis. Bilateral into thyroid hypodensity is nonspecific but likely benign. Minimal nodular thickening of the major fissure on the left is likely benign. Asymmetric soft tissue densities within the breasts incompletely included on  this exam.          Assessment:     1. Ductal carcinoma in situ (DCIS) of left breast    2. Ductal carcinoma in situ (DCIS) of breast, unspecified laterality    3. Triple negative malignant neoplasm of breast ([address]    4. Neuropathy    5. Chronic pain syndrome    6. Chronic anemia      Plan:     # DCIS left breast/ Invasive ductal carcinoma right breast   # Triple negative breast cancer  -- B/l Mastectomy 6/2014  -- The patient had completed adjuvant systemic chemotherapy, carboplatin and Taxotere. Since she had triple negative for cancer, she was not suggested on antiestrogen therapy at that time. Patient was being followed by Dr. Luis Werner who retired. -- The patient reported right breast discomfort every now and then which occurred since her last surgery; however she denied any new breast issues or worsening pain. -- Clinically the patient is doing well. Recent labs reviewed with the patient. Plan:  -- The patient was instructed to continue doing her chest wall and axilla exams on a monthly basis. -- The patient was advised to notify us if any breast pain, lumps or bumps, breast skin changes, unintentional weight loss, worsen fatigue, new bone pain or back pain, or any concerns.   -- I have advised her to follow up her PCP to continue age-appropriate cancer screening. -- I have educated her regarding lifestyle modifications, minimizing alcohol intake, refraining from smoking, healthy diet, and physical activity. # Chronic Anemia:   -- Her most recent CBC showed her hemoglobin was normal at 12.5 g/dL with hematocrit of 41%. -- Iron profiles and Ferritin reviewed. Advised to stop taking iron pills. # Chemotherapy-induced peripheral neuropathy:  # Chronic pain syndrome, Arthritis:   -- She has been taking Neurontin 200mg PO TID. She was on Percocet 5mg every 4-6 hours PRN for pain previously. -- She was referred to pain management for long-term pain control. She will f/u her PCP/Neurology/Pain management for long term pain control.         -- We will see the patient back in clinic in about 6 months. Always sooner if required. The patient can have lab done prior to our next clinic visit. No orders of the defined types were placed in this encounter. Ms. Shelly Jennings has a reminder for a \"due or due soon\" health maintenance. I have asked that she contact her primary care provider for follow-up on this health maintenance. All of patient's questions answered to their apparent satisfaction. They verbally show understanding and agreement with aforementioned plan. Randal Bucio MD  6/2/2021          About 30 minutes were spent for this encounter with more than 50% of the time spent in face-to-face counseling, discussing on diagnosis and management plan going forward, and co-ordination of care. Parts of this document has been produced using Dragon dictation system. Unrecognized errors in transcription may be present. Please do not hesitate to reach out for any questions or clarifications.       CC: Prema Schneider,

## 2021-06-02 ENCOUNTER — OFFICE VISIT (OUTPATIENT)
Dept: ONCOLOGY | Age: 61
End: 2021-06-02
Payer: COMMERCIAL

## 2021-06-02 VITALS
TEMPERATURE: 98 F | SYSTOLIC BLOOD PRESSURE: 145 MMHG | HEIGHT: 64 IN | RESPIRATION RATE: 16 BRPM | HEART RATE: 59 BPM | OXYGEN SATURATION: 96 % | BODY MASS INDEX: 39.95 KG/M2 | WEIGHT: 234 LBS | DIASTOLIC BLOOD PRESSURE: 89 MMHG

## 2021-06-02 DIAGNOSIS — G62.9 NEUROPATHY: ICD-10-CM

## 2021-06-02 DIAGNOSIS — G89.4 CHRONIC PAIN SYNDROME: ICD-10-CM

## 2021-06-02 DIAGNOSIS — D05.10 DUCTAL CARCINOMA IN SITU (DCIS) OF BREAST, UNSPECIFIED LATERALITY: ICD-10-CM

## 2021-06-02 DIAGNOSIS — D64.9 CHRONIC ANEMIA: ICD-10-CM

## 2021-06-02 DIAGNOSIS — D05.12 DUCTAL CARCINOMA IN SITU (DCIS) OF LEFT BREAST: Primary | ICD-10-CM

## 2021-06-02 DIAGNOSIS — C50.919 TRIPLE NEGATIVE MALIGNANT NEOPLASM OF BREAST (HCC): ICD-10-CM

## 2021-06-02 PROCEDURE — 99214 OFFICE O/P EST MOD 30 MIN: CPT | Performed by: INTERNAL MEDICINE

## 2021-07-29 DIAGNOSIS — G62.0 CHEMOTHERAPY-INDUCED PERIPHERAL NEUROPATHY (HCC): ICD-10-CM

## 2021-07-29 DIAGNOSIS — T45.1X5A CHEMOTHERAPY-INDUCED PERIPHERAL NEUROPATHY (HCC): ICD-10-CM

## 2021-07-29 RX ORDER — GABAPENTIN 100 MG/1
200 CAPSULE ORAL 3 TIMES DAILY
Qty: 180 CAPSULE | Refills: 2 | Status: SHIPPED | OUTPATIENT
Start: 2021-07-29 | End: 2021-12-03 | Stop reason: SDUPTHER

## 2021-11-19 ENCOUNTER — LAB ONLY (OUTPATIENT)
Dept: ONCOLOGY | Age: 61
End: 2021-11-19

## 2021-11-19 DIAGNOSIS — C50.919 TRIPLE NEGATIVE MALIGNANT NEOPLASM OF BREAST (HCC): ICD-10-CM

## 2021-11-19 DIAGNOSIS — D64.9 CHRONIC ANEMIA: ICD-10-CM

## 2021-11-19 DIAGNOSIS — G62.0 CHEMOTHERAPY-INDUCED PERIPHERAL NEUROPATHY (HCC): Primary | ICD-10-CM

## 2021-11-19 DIAGNOSIS — D05.12 DUCTAL CARCINOMA IN SITU (DCIS) OF LEFT BREAST: ICD-10-CM

## 2021-11-19 DIAGNOSIS — G62.9 NEUROPATHY: ICD-10-CM

## 2021-11-19 DIAGNOSIS — D05.10 DUCTAL CARCINOMA IN SITU (DCIS) OF BREAST, UNSPECIFIED LATERALITY: ICD-10-CM

## 2021-11-19 DIAGNOSIS — T45.1X5A CHEMOTHERAPY-INDUCED PERIPHERAL NEUROPATHY (HCC): Primary | ICD-10-CM

## 2021-11-23 LAB
ALBUMIN SERPL-MCNC: 4.1 G/DL (ref 3.8–4.8)
ALBUMIN/GLOB SERPL: 1.1 {RATIO} (ref 1.2–2.2)
ALP SERPL-CCNC: 90 IU/L (ref 44–121)
ALT SERPL-CCNC: 18 IU/L (ref 0–32)
AST SERPL-CCNC: 19 IU/L (ref 0–40)
BASOPHILS # BLD AUTO: 0 X10E3/UL (ref 0–0.2)
BASOPHILS NFR BLD AUTO: 1 %
BILIRUB SERPL-MCNC: 0.3 MG/DL (ref 0–1.2)
BUN SERPL-MCNC: 12 MG/DL (ref 8–27)
BUN/CREAT SERPL: 17 (ref 12–28)
CALCIUM SERPL-MCNC: 9.6 MG/DL (ref 8.7–10.3)
CANCER AG27-29 SERPL-ACNC: 24.7 U/ML (ref 0–38.6)
CHLORIDE SERPL-SCNC: 102 MMOL/L (ref 96–106)
CO2 SERPL-SCNC: 26 MMOL/L (ref 20–29)
CREAT SERPL-MCNC: 0.72 MG/DL (ref 0.57–1)
EOSINOPHIL # BLD AUTO: 0.4 X10E3/UL (ref 0–0.4)
EOSINOPHIL NFR BLD AUTO: 7 %
ERYTHROCYTE [DISTWIDTH] IN BLOOD BY AUTOMATED COUNT: 13.9 % (ref 11.7–15.4)
FERRITIN SERPL-MCNC: 810 NG/ML (ref 15–150)
GLOBULIN SER CALC-MCNC: 3.7 G/DL (ref 1.5–4.5)
GLUCOSE SERPL-MCNC: 97 MG/DL (ref 65–99)
HCT VFR BLD AUTO: 39.9 % (ref 34–46.6)
HGB BLD-MCNC: 12.3 G/DL (ref 11.1–15.9)
IMM GRANULOCYTES # BLD AUTO: 0 X10E3/UL (ref 0–0.1)
IMM GRANULOCYTES NFR BLD AUTO: 0 %
IRON SATN MFR SERPL: 29 % (ref 15–55)
IRON SERPL-MCNC: 67 UG/DL (ref 27–139)
LYMPHOCYTES # BLD AUTO: 2.5 X10E3/UL (ref 0.7–3.1)
LYMPHOCYTES NFR BLD AUTO: 41 %
MCH RBC QN AUTO: 23.3 PG (ref 26.6–33)
MCHC RBC AUTO-ENTMCNC: 30.8 G/DL (ref 31.5–35.7)
MCV RBC AUTO: 76 FL (ref 79–97)
MONOCYTES # BLD AUTO: 0.5 X10E3/UL (ref 0.1–0.9)
MONOCYTES NFR BLD AUTO: 8 %
NEUTROPHILS # BLD AUTO: 2.6 X10E3/UL (ref 1.4–7)
NEUTROPHILS NFR BLD AUTO: 43 %
PLATELET # BLD AUTO: 284 X10E3/UL (ref 150–450)
POTASSIUM SERPL-SCNC: 4.2 MMOL/L (ref 3.5–5.2)
PROT SERPL-MCNC: 7.8 G/DL (ref 6–8.5)
RBC # BLD AUTO: 5.27 X10E6/UL (ref 3.77–5.28)
SODIUM SERPL-SCNC: 141 MMOL/L (ref 134–144)
TIBC SERPL-MCNC: 230 UG/DL (ref 250–450)
UIBC SERPL-MCNC: 163 UG/DL (ref 118–369)
WBC # BLD AUTO: 6 X10E3/UL (ref 3.4–10.8)

## 2021-11-30 RX ORDER — GABAPENTIN 100 MG/1
100 CAPSULE ORAL 3 TIMES DAILY
Qty: 180 CAPSULE | Refills: 0 | Status: CANCELLED | OUTPATIENT
Start: 2021-11-30

## 2021-12-03 ENCOUNTER — OFFICE VISIT (OUTPATIENT)
Dept: ONCOLOGY | Age: 61
End: 2021-12-03
Payer: COMMERCIAL

## 2021-12-03 VITALS
HEIGHT: 64 IN | DIASTOLIC BLOOD PRESSURE: 81 MMHG | BODY MASS INDEX: 40.33 KG/M2 | WEIGHT: 236.2 LBS | HEART RATE: 61 BPM | OXYGEN SATURATION: 96 % | TEMPERATURE: 98.6 F | SYSTOLIC BLOOD PRESSURE: 133 MMHG

## 2021-12-03 DIAGNOSIS — D05.10 DUCTAL CARCINOMA IN SITU (DCIS) OF BREAST, UNSPECIFIED LATERALITY: Primary | ICD-10-CM

## 2021-12-03 DIAGNOSIS — D64.9 CHRONIC ANEMIA: ICD-10-CM

## 2021-12-03 DIAGNOSIS — T45.1X5A CHEMOTHERAPY-INDUCED PERIPHERAL NEUROPATHY (HCC): ICD-10-CM

## 2021-12-03 DIAGNOSIS — G62.0 CHEMOTHERAPY-INDUCED PERIPHERAL NEUROPATHY (HCC): ICD-10-CM

## 2021-12-03 PROCEDURE — 99213 OFFICE O/P EST LOW 20 MIN: CPT | Performed by: INTERNAL MEDICINE

## 2021-12-03 RX ORDER — GABAPENTIN 100 MG/1
300 CAPSULE ORAL 3 TIMES DAILY
Qty: 270 CAPSULE | Refills: 2 | Status: SHIPPED | OUTPATIENT
Start: 2021-12-03 | End: 2022-01-11 | Stop reason: SDUPTHER

## 2021-12-03 NOTE — PROGRESS NOTES
Hematology/Oncology  Progress Note    Name: Paz Coronel  Date: 12/3/2021  : 1960    PCP: Bethanie Baumgarten, DO    Ms. Bradley Sorensen is a 64 y.o.  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  --B/L Mastectomy 2014  --The patient completed adjuvant systemic chemotherapy  --Presently on surveillance    Subjective:   Ms. Paz Coronel is a 64 y.o. 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 She underwent B/L mastectomies in . She has decided not to proceed with reconstruction. She is complaining of numbness on her hands and feet and requesting her Gabapentin to be increased. Otherwise she states she is doing well. She denies any fevers, chills, recurrent infections, and skin rash. She denies any shortness of breath, dizziness, chest pains, weakness, and fatigue. She denies abdominal pain, nausea, vomiting, diarrhea, or constipation. She denies acute vision changes, headaches, and cough. She denies pain or any discomfort. She does not have any concerns or complaints to report at this time. Past medical history, family history, and social history: these were reviewed and remains unchanged.     Past Medical History:   Diagnosis Date    Arthritis     Cancer [facility])     right breast cancer     DDD (degenerative disc disease), cervical 2017    Hypercholesterolemia     Hypertension     Neuropathy      Past Surgical History:   Procedure Laterality Date    COLONOSCOPY N/A 2021    COLONOSCOPY performed by Cicero Severance, MD at [address] HX BREAST RECONSTRUCTION  2014    BILATERAL BREAST RECONSTRUCTION WITH TISSUE EXPANDERS performed by Heena Batres MD at [address] [facility]  [facility]      HX CHOLECYSTECTOMY      HX GYN      fibroid tumors removed    HX MASTECTOMY  2014    BILATERAL BREAST MASTECTOMY SIMPLE performed by Victor Hugo Muñoz MD at [facility] MAIN OR    HX TUBAL LIGATION      HX VASCULAR ACCESS      left chest pac- removed 2015     Social History     Socioeconomic History    Marital status:      Spouse name: Not on file    Number of children: Not on file    Years of education: Not on file    Highest education level: Not on file   Occupational History    Not on file   Tobacco Use    Smoking status: Former Smoker     Types: Cigarettes     Quit date: 2014     Years since quittin.5    Smokeless tobacco: Never Used   Vaping Use    Vaping Use: Never used   Substance and Sexual Activity    Alcohol use: Not Currently     Comment: Socially    Drug use: No    Sexual activity: Not Currently   Other Topics Concern    Not on file   Social History Narrative    Not on file     Social Determinants of Health     Financial Resource Strain:     Difficulty of Paying Living Expenses: Not on file   Food Insecurity:     Worried About Running Out of Food in the Last Year: Not on file    Jasmina of Food in the Last Year: Not on file   Transportation Needs:     Lack of Transportation (Medical): Not on file    Lack of Transportation (Non-Medical):  Not on file   Physical Activity:     Days of Exercise per Week: Not on file    Minutes of Exercise per Session: Not on file   Stress:     Feeling of Stress : Not on file   Social Connections:     Frequency of Communication with Friends and Family: Not on file    Frequency of Social Gatherings with Friends and Family: Not on file    Attends Mosque Services: Not on file    Active Member of Clubs or Organizations: Not on file    Attends Club or Organization Meetings: Not on file    Marital Status: Not on file   Intimate Partner Violence:     Fear of Current or Ex-Partner: Not on file    Emotionally Abused: Not on file    Physically Abused: Not on file    Sexually Abused: Not on file   Housing Stability:     Unable to Pay for Housing in the Last Year: Not on file    Number of Places Lived in the Last Year: Not on file    Unstable Housing in the Last Year: Not on file     Family History   Problem Relation Age of Onset    Hypertension Mother     Diabetes Father     Diabetes Daughter     Diabetes Son      Current Outpatient Medications   Medication Sig Dispense Refill    meloxicam (MOBIC) 15 mg tablet Take 1 Tab by mouth daily. 30 Tab 0    lovastatin (MEVACOR) 20 mg tablet Take 1 Tab by mouth daily. 90 Tab 1    triamterene-hydroCHLOROthiazide (DYAZIDE) 37.5-25 mg per capsule Take 1 Cap by mouth daily. 90 Cap 1       Review of Systems   Constitutional: Negative for chills, diaphoresis, fever, malaise/fatigue and weight loss. Respiratory: Negative for cough, hemoptysis, shortness of breath and wheezing. Cardiovascular: Negative for chest pain, palpitations and leg swelling. Gastrointestinal: Negative for abdominal pain, diarrhea, heartburn, nausea and vomiting. Genitourinary: Negative for dysuria, frequency, hematuria and urgency. Musculoskeletal: Negative for joint pain and myalgias. Skin: Negative for itching and rash. Neurological: Negative for dizziness, tingling, seizures, weakness and headaches. Psychiatric/Behavioral: Negative for depression. The patient does not have insomnia. Objective: There were no vitals taken for this visit. ECOG Performance Status (grade): 0  0 - able to carry on all pre-disease activity w/out restriction  1 - restricted but able to carry out light work  2 - ambulatory and can self- care but unable to carry out work  3 - bed or chair >50% of waking hours  4 - completely disable, total care, confined to bed or chair    Physical Exam  Constitutional:       Appearance: Normal appearance. HENT:      Head: Normocephalic and atraumatic. Eyes:      Pupils: Pupils are equal, round, and reactive to light. Cardiovascular:      Rate and Rhythm: Normal rate and regular rhythm.       Heart sounds: Normal heart sounds. Pulmonary:      Effort: Pulmonary effort is normal.      Breath sounds: Normal breath sounds. Abdominal:      General: Bowel sounds are normal.      Palpations: Abdomen is soft. Tenderness: There is no abdominal tenderness. There is no guarding. Musculoskeletal:         General: Normal range of motion. Cervical back: Neck supple. Right lower leg: No edema. Left lower leg: No edema. Skin:     General: Skin is warm. Neurological:      General: No focal deficit present. Mental Status: She is alert and oriented to person, place, and time. Mental status is at baseline. Anterior chest wall and breast: Clinically there is no evidence of disease recurrence. Examination of the chest wall and breast shows no mass, no tenderness or abnormality. The axilla reveals no palpable axillary lymphadenopathy. Diagnostics:      No results found for this or any previous visit (from the past 96 hour(s)). Imaging:  No results found for this or any previous visit. Results for orders placed during the hospital encounter of 06/26/17    XR SPINE CERV PA LAT ODONT 3 V MAX    Narrative  Cervical Spine AP And Lateral    CPT CODE: 11785    HISTORY: , neck pain, left arm pain with numbness and tingling. COMPARISON: None. FINDINGS:    Two views obtained. The lateral view demonstrates from skull base to T1. The  vertebra are normal in height and alignment. The disc spaces are moderately  narrowed from C3 through C6 with ventral spurring. The prevertebral soft  tissues are normal.  There is no evidence of fracture or dislocation.     Impression  IMPRESSION:    Multilevel moderate degenerative disc disease of the mid to lower cervical spine      Results for orders placed during the hospital encounter of 07/08/14    CT CHEST ABD PELV W CONT    Narrative  CT  CHEST, ABDOMEN AND PELVIS WITH CONTRAST    CPT CODE: 58034, [id number], [id number]    INDICATION: New diagnosis breast cancer, staging, BREAST CANCER. COMPARISON: Mammogram 6/10/14. TECHNIQUE:  Standard helical images were obtained from the thoracic inlet  through the inferior pubic rami at 5 mm thick sections following the  intravenous injection of a bolus of  100 cc nonionic contrast.  Images were  reviewed on both soft tissue, lung, and bone window settings. Coronal and  sagittal reformations obtained. CTDIvol DLP 1080.37 CTDIvol mGy    CT CHEST FINDINGS:    The included portion of the thyroid is demonstrates a bilateral tiny  intrathyroid hypodensity measuring less than 5 mm, image 2 series 2..  Minimal micronodular thickening of the left major fissure at the inferior hilar  level, image 28 series 4. There is no evidence of mediastinal, hilar, nor axillary adenopathy. The great vessels and thoracic aorta are unremarkable. There are no pleural effusions. The breasts are incompletely included on this exam. Asymmetrical soft tissue  densities are seen within the medial breasts bilaterally. CT ABDOMEN FINDINGS:    The liver and spleen are normal in size and density. The biliary tree is not  dilated. There is bilateral renal function without obstruction, without cyst, stone or  mass. Adrenals and Pancreas  are of normal CT appearance. There is no free fluid or free air. The large and small bowel seem unremarkable. There are several minimally enlarged periportal nodes which are nonspecific. 1.5 x 1 cm node image 74 series 2. Portacaval node measures 1.2 x 0.6 cm. Image  75 series 2. CT PELVIS FINDINGS:    There is no free pelvic fluid. The uterus is not enlarged. There are however several small hypodense masses  within the myometrium. No abnormality of the ovaries. The bladder is incompletely distended but unremarkable. There is no significant adenopathy. Posterior articular facet disease of the lower lumbar spine noted.     Impression  :    Several minimally enlarged periportal/peter hepatis lymph nodes are  nonspecific. Followup in 6 months may be of benefit as clinically indicated. Probable small uterine fibroids or adenomyosis. Bilateral into thyroid hypodensity is nonspecific but likely benign. Minimal nodular thickening of the major fissure on the left is likely benign. Asymmetric soft tissue densities within the breasts incompletely included on  this exam.          Assessment:   DCIS Left Breast/Invasive Ductal Carcinoma Right Breast  Triple Negative Breast Cancer  Chronic Anemia  Chemotherapy Induced Peripheral neuropathy    Plan:   DCIS left breast/ Invasive ductal carcinoma right breast   Triple negative breast cancer  --B/L Mastectomy 6/2014  --The patient had completed adjuvant systemic chemotherapy, carboplatin and Taxotere. Since she had triple negative for cancer, she was not suggested on antiestrogen therapy at that time. Patient was being followed by Dr. Dae Gan who retired. --She denies any new breast issues or worsening pain. --Clinically the patient is doing well. Recent labs reviewed with the patient. Plan:  --The patient was instructed to continue doing her chest wall and axilla exams on a monthly basis. --The patient was advised to notify us if any breast pain, lumps or bumps, breast skin changes, unintentional weight loss, worsen fatigue, new bone pain or back pain, or any concerns. - I have advised her to follow up her PCP to continue age-appropriate cancer screening. --I have educated her regarding lifestyle modifications, minimizing alcohol intake, refraining from smoking, healthy diet, and physical activity. Chronic Anemia:   --11/19/2021: CBC showed WBC 6.0K/uL, hemoglobin 12.3g/dL with hematocrit of 39.9%. Platelet 544. BUN and Creatinine were normal. Iron Sat 29%. Ferritin 810. --We will continue to monitor    # Chemotherapy-induced peripheral neuropathy:  # Chronic pain syndrome, Arthritis:   --She has been taking Neurontin 200mg PO TID.  Patient is complaining of numbness in hands and feet and is requesting her Gabapentin to be increased  --Gabapentin will be increased to 300mg PO TID  --She was referred to pain management for long-term pain control. The pain management was not covered by her insurance and she states she is fine now and does not need any new referral.     Follow up in 6 months with repeat labs or sooner if indicated.     Orders Placed This Encounter    CBC WITH AUTOMATED DIFF     Standing Status:   Future     Standing Expiration Date:   12/4/2022    FERRITIN     Standing Status:   Future     Standing Expiration Date:   12/4/2022    IRON PROFILE     Standing Status:   Future     Standing Expiration Date:   24/9/2280    METABOLIC PANEL, COMPREHENSIVE     Standing Status:   Future     Standing Expiration Date:   12/4/2022         Keagan Kennedy MD  12/3/2021      CC: Ching Carlisle DO

## 2022-01-11 ENCOUNTER — DOCUMENTATION ONLY (OUTPATIENT)
Dept: ONCOLOGY | Age: 62
End: 2022-01-11

## 2022-01-11 DIAGNOSIS — T45.1X5A CHEMOTHERAPY-INDUCED PERIPHERAL NEUROPATHY (HCC): ICD-10-CM

## 2022-01-11 DIAGNOSIS — G62.0 CHEMOTHERAPY-INDUCED PERIPHERAL NEUROPATHY (HCC): ICD-10-CM

## 2022-01-11 RX ORDER — GABAPENTIN 100 MG/1
300 CAPSULE ORAL 3 TIMES DAILY
Qty: 180 CAPSULE | Refills: 0 | Status: SHIPPED | OUTPATIENT
Start: 2022-01-11 | End: 2022-05-05 | Stop reason: SDUPTHER

## 2022-01-11 NOTE — TELEPHONE ENCOUNTER
PA for patient to get gabapentin has been denied. Insurance will not approve qty over 180 tabs.    Please re send rx to pharmacy with only 180 tabs

## 2022-01-11 NOTE — PROGRESS NOTES
Patients insurance has approved the appeal for patients Gabapentin. She can now get 240 tabs at her next refill date.

## 2022-03-19 PROBLEM — G89.4 CHRONIC PAIN SYNDROME: Status: ACTIVE | Noted: 2018-08-07

## 2022-03-19 PROBLEM — D64.9 CHRONIC ANEMIA: Status: ACTIVE | Noted: 2018-05-08

## 2022-03-20 PROBLEM — E66.01 OBESITY, MORBID (HCC): Status: ACTIVE | Noted: 2019-07-12

## 2022-05-05 DIAGNOSIS — T45.1X5A CHEMOTHERAPY-INDUCED PERIPHERAL NEUROPATHY (HCC): ICD-10-CM

## 2022-05-05 DIAGNOSIS — G62.0 CHEMOTHERAPY-INDUCED PERIPHERAL NEUROPATHY (HCC): ICD-10-CM

## 2022-05-09 RX ORDER — GABAPENTIN 100 MG/1
300 CAPSULE ORAL 3 TIMES DAILY
Qty: 240 CAPSULE | Refills: 0 | Status: SHIPPED | OUTPATIENT
Start: 2022-05-09 | End: 2022-06-16 | Stop reason: SDUPTHER

## 2022-05-23 ENCOUNTER — APPOINTMENT (OUTPATIENT)
Dept: ONCOLOGY | Age: 62
End: 2022-05-23

## 2022-05-24 LAB
ALBUMIN SERPL-MCNC: 4.2 G/DL (ref 3.8–4.8)
ALBUMIN/GLOB SERPL: 1.2 {RATIO} (ref 1.2–2.2)
ALP SERPL-CCNC: 95 IU/L (ref 44–121)
ALT SERPL-CCNC: 17 IU/L (ref 0–32)
AST SERPL-CCNC: 20 IU/L (ref 0–40)
BASOPHILS # BLD AUTO: 0 X10E3/UL (ref 0–0.2)
BASOPHILS NFR BLD AUTO: 1 %
BILIRUB SERPL-MCNC: 0.4 MG/DL (ref 0–1.2)
BUN SERPL-MCNC: 14 MG/DL (ref 8–27)
BUN/CREAT SERPL: 17 (ref 12–28)
CALCIUM SERPL-MCNC: 9.6 MG/DL (ref 8.7–10.3)
CHLORIDE SERPL-SCNC: 100 MMOL/L (ref 96–106)
CO2 SERPL-SCNC: 25 MMOL/L (ref 20–29)
CREAT SERPL-MCNC: 0.81 MG/DL (ref 0.57–1)
EGFR: 82 ML/MIN/1.73
EOSINOPHIL # BLD AUTO: 0.4 X10E3/UL (ref 0–0.4)
EOSINOPHIL NFR BLD AUTO: 5 %
ERYTHROCYTE [DISTWIDTH] IN BLOOD BY AUTOMATED COUNT: 13.4 % (ref 11.7–15.4)
FERRITIN SERPL-MCNC: 835 NG/ML (ref 15–150)
GLOBULIN SER CALC-MCNC: 3.6 G/DL (ref 1.5–4.5)
GLUCOSE SERPL-MCNC: 96 MG/DL (ref 65–99)
HCT VFR BLD AUTO: 40.9 % (ref 34–46.6)
HGB BLD-MCNC: 12.6 G/DL (ref 11.1–15.9)
IMM GRANULOCYTES # BLD AUTO: 0 X10E3/UL (ref 0–0.1)
IMM GRANULOCYTES NFR BLD AUTO: 0 %
IRON SATN MFR SERPL: 29 % (ref 15–55)
IRON SERPL-MCNC: 71 UG/DL (ref 27–139)
LYMPHOCYTES # BLD AUTO: 2.4 X10E3/UL (ref 0.7–3.1)
LYMPHOCYTES NFR BLD AUTO: 35 %
MCH RBC QN AUTO: 23.2 PG (ref 26.6–33)
MCHC RBC AUTO-ENTMCNC: 30.8 G/DL (ref 31.5–35.7)
MCV RBC AUTO: 76 FL (ref 79–97)
MONOCYTES # BLD AUTO: 0.5 X10E3/UL (ref 0.1–0.9)
MONOCYTES NFR BLD AUTO: 8 %
NEUTROPHILS # BLD AUTO: 3.4 X10E3/UL (ref 1.4–7)
NEUTROPHILS NFR BLD AUTO: 51 %
PLATELET # BLD AUTO: 294 X10E3/UL (ref 150–450)
POTASSIUM SERPL-SCNC: 4.3 MMOL/L (ref 3.5–5.2)
PROT SERPL-MCNC: 7.8 G/DL (ref 6–8.5)
RBC # BLD AUTO: 5.42 X10E6/UL (ref 3.77–5.28)
SODIUM SERPL-SCNC: 139 MMOL/L (ref 134–144)
TIBC SERPL-MCNC: 242 UG/DL (ref 250–450)
UIBC SERPL-MCNC: 171 UG/DL (ref 118–369)
WBC # BLD AUTO: 6.6 X10E3/UL (ref 3.4–10.8)

## 2022-06-03 DIAGNOSIS — D64.9 CHRONIC ANEMIA: ICD-10-CM

## 2022-06-03 DIAGNOSIS — D05.10 DUCTAL CARCINOMA IN SITU (DCIS) OF BREAST, UNSPECIFIED LATERALITY: ICD-10-CM

## 2022-06-06 ENCOUNTER — OFFICE VISIT (OUTPATIENT)
Dept: ONCOLOGY | Age: 62
End: 2022-06-06
Payer: COMMERCIAL

## 2022-06-06 VITALS
WEIGHT: 238 LBS | HEIGHT: 64 IN | OXYGEN SATURATION: 96 % | DIASTOLIC BLOOD PRESSURE: 76 MMHG | BODY MASS INDEX: 40.63 KG/M2 | TEMPERATURE: 98 F | HEART RATE: 60 BPM | SYSTOLIC BLOOD PRESSURE: 125 MMHG

## 2022-06-06 DIAGNOSIS — D05.10 DUCTAL CARCINOMA IN SITU (DCIS) OF BREAST, UNSPECIFIED LATERALITY: Primary | ICD-10-CM

## 2022-06-06 DIAGNOSIS — D64.9 CHRONIC ANEMIA: ICD-10-CM

## 2022-06-06 DIAGNOSIS — T45.1X5A CHEMOTHERAPY-INDUCED PERIPHERAL NEUROPATHY (HCC): ICD-10-CM

## 2022-06-06 DIAGNOSIS — D05.12 DUCTAL CARCINOMA IN SITU (DCIS) OF LEFT BREAST: ICD-10-CM

## 2022-06-06 DIAGNOSIS — G62.0 CHEMOTHERAPY-INDUCED PERIPHERAL NEUROPATHY (HCC): ICD-10-CM

## 2022-06-06 DIAGNOSIS — C50.919 TRIPLE NEGATIVE MALIGNANT NEOPLASM OF BREAST (HCC): ICD-10-CM

## 2022-06-06 DIAGNOSIS — G62.9 NEUROPATHY: ICD-10-CM

## 2022-06-06 PROCEDURE — 99213 OFFICE O/P EST LOW 20 MIN: CPT | Performed by: INTERNAL MEDICINE

## 2022-06-06 NOTE — PROGRESS NOTES
Hematology/Oncology  Progress Note    Name: Bharath Macodnald  Date: 2022  : 1960    PCP: Carol Ann Robert DO    Ms. Daina Covington is a 58 y.o.  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  --B/L Mastectomy 2014  --The patient completed adjuvant systemic chemotherapy  --Presently on surveillance    Subjective:   Ms. Bharath Macdonald is a 58 y.o. 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 She underwent B/L mastectomies in . She report numbness on her hands and feet and and currently taking Gabapentin. Today she denies any fevers, chills, recurrent infections, and skin rash. She denies any shortness of breath, dizziness, chest pains, weakness, and fatigue. She denies abdominal pain, nausea, vomiting, diarrhea, or constipation. She denies acute vision changes, headaches, and cough. She denies pain or any discomfort. She does not have any concerns or complaints to report at this time. Past medical history, family history, and social history: these were reviewed and remains unchanged.     Past Medical History:   Diagnosis Date    Arthritis     Cancer [facility])     right breast cancer     DDD (degenerative disc disease), cervical 2017    Hypercholesterolemia     Hypertension     Neuropathy      Past Surgical History:   Procedure Laterality Date    COLONOSCOPY N/A 2021    COLONOSCOPY performed by Samantha Vieyra MD at [address] HX BREAST RECONSTRUCTION  2014    BILATERAL BREAST RECONSTRUCTION WITH TISSUE EXPANDERS performed by Reji Lemus MD at [address] [facility]  [facility]      HX CHOLECYSTECTOMY      HX GYN      fibroid tumors removed    HX MASTECTOMY  2014    BILATERAL BREAST MASTECTOMY SIMPLE performed by Aurelia Barrera MD at [address] [facility] HX TUBAL LIGATION      HX VASCULAR ACCESS      left chest pac- removed      Social History     Socioeconomic History    Marital status:      Spouse name: Not on file    Number of children: Not on file    Years of education: Not on file    Highest education level: Not on file   Occupational History    Not on file   Tobacco Use    Smoking status: Former Smoker     Types: Cigarettes     Quit date: 2014     Years since quittin.0    Smokeless tobacco: Never Used   Vaping Use    Vaping Use: Never used   Substance and Sexual Activity    Alcohol use: Not Currently     Comment: Socially    Drug use: No    Sexual activity: Not Currently   Other Topics Concern    Not on file   Social History Narrative    Not on file     Social Determinants of Health     Financial Resource Strain:     Difficulty of Paying Living Expenses: Not on file   Food Insecurity:     Worried About [address] in the Last Year: Not on file    [address] [employer] in the Last Year: Not on file   Transportation Needs:     Lack of Transportation (Medical): Not on file    Lack of Transportation (Non-Medical):  Not on file   Physical Activity:     Days of Exercise per Week: Not on file    Minutes of Exercise per Session: Not on file   Stress:     Feeling of Stress : Not on file   Social Connections:     Frequency of Communication with Friends and Family: Not on file    Frequency of Social Gatherings with Friends and Family: Not on file    Attends Protestant Services: Not on file    Active Member of [address] [place] [address] or Organizations: Not on file    Attends Club or Organization Meetings: Not on file    Marital Status: Not on file   Intimate Partner Violence:     Fear of Current or Ex-Partner: Not on file    Emotionally Abused: Not on file    Physically Abused: Not on file    Sexually Abused: Not on file   Housing Stability:     Unable to Pay for Housing in the Last Year: Not on file    Number of [place] in the Last Year: Not on file    Unstable Housing in the Last Year: Not on file     Family History   Problem Relation Age of Onset    Hypertension Mother     Diabetes Father     Diabetes Daughter     Diabetes Son      Current Outpatient Medications   Medication Sig Dispense Refill    gabapentin (NEURONTIN) 100 mg capsule Take 3 Capsules by mouth three (3) times daily. Max Daily Amount: 900 mg. Indications: neuropathic pain 240 Capsule 0    meloxicam (MOBIC) 15 mg tablet Take 1 Tab by mouth daily. 30 Tab 0    lovastatin (MEVACOR) 20 mg tablet Take 1 Tab by mouth daily. 90 Tab 1    triamterene-hydroCHLOROthiazide (DYAZIDE) 37.5-25 mg per capsule Take 1 Cap by mouth daily. 90 Cap 1       Review of Systems   Constitutional: Negative for chills, diaphoresis, fever, malaise/fatigue and weight loss. Respiratory: Negative for cough, hemoptysis, shortness of breath and wheezing. Cardiovascular: Negative for chest pain, palpitations and leg swelling. Gastrointestinal: Negative for abdominal pain, diarrhea, heartburn, nausea and vomiting. Genitourinary: Negative for dysuria, frequency, hematuria and urgency. Musculoskeletal: Negative for joint pain and myalgias. Skin: Negative for itching and rash. Neurological: Negative for dizziness, tingling, seizures, weakness and headaches. Psychiatric/Behavioral: Negative for depression. The patient does not have insomnia. Objective:     Visit Vitals  /76   Pulse 60   Temp 98 °F (36.7 °C)   Ht 5' 4\" (1.626 m)   Wt 108 kg (238 lb)   SpO2 96%   BMI 40.85 kg/m²       ECOG Performance Status (grade): 0  0 - able to carry on all pre-disease activity w/out restriction  1 - restricted but able to carry out light work  2 - ambulatory and can self- care but unable to carry out work  3 - bed or chair >50% of waking hours  4 - completely disable, total care, confined to bed or chair    Physical Exam  Constitutional:       Appearance: Normal appearance. HENT:      Head: Normocephalic and atraumatic.    Eyes:      Pupils: Pupils are equal, round, and reactive to light. Cardiovascular:      Rate and Rhythm: Normal rate and regular rhythm. Heart sounds: Normal heart sounds. Pulmonary:      Effort: Pulmonary effort is normal.      Breath sounds: Normal breath sounds. Abdominal:      General: Bowel sounds are normal.      Palpations: Abdomen is soft. Tenderness: There is no abdominal tenderness. There is no guarding. Musculoskeletal:         General: Normal range of motion. Cervical back: Neck supple. Right lower leg: No edema. Left lower leg: No edema. Skin:     General: Skin is warm. Neurological:      General: No focal deficit present. Mental Status: She is alert and oriented to person, place, and time. Mental status is at baseline. Anterior chest wall and breast: Clinically there is no evidence of disease recurrence. Examination of the chest wall and breast shows no mass, no tenderness or abnormality. The axilla reveals no palpable axillary lymphadenopathy. Diagnostics:      No results found for this or any previous visit (from the past 96 hour(s)). Imaging:  No results found for this or any previous visit. Results for orders placed during the hospital encounter of 06/26/17    XR SPINE CERV PA LAT ODONT 3 V MAX    Narrative  Cervical Spine AP And Lateral    CPT CODE: 68547    HISTORY: , neck pain, left arm pain with numbness and tingling. COMPARISON: None. FINDINGS:    Two views obtained. The lateral view demonstrates from skull base to T1. The  vertebra are normal in height and alignment. The disc spaces are moderately  narrowed from C3 through C6 with ventral spurring. The prevertebral soft  tissues are normal.  There is no evidence of fracture or dislocation.     Impression  IMPRESSION:    Multilevel moderate degenerative disc disease of the mid to lower cervical spine      Results for orders placed during the hospital encounter of 07/08/14    CT CHEST ABD PELV W CONT    Narrative  CT  CHEST, ABDOMEN AND PELVIS WITH CONTRAST    CPT CODE: [id number], [id number], [id number]    INDICATION: New diagnosis breast cancer, staging, BREAST CANCER. COMPARISON: Mammogram 6/10/14. TECHNIQUE:  Standard helical images were obtained from the thoracic inlet  through the inferior pubic rami at 5 mm thick sections following the  intravenous injection of a bolus of  100 cc nonionic contrast.  Images were  reviewed on both soft tissue, lung, and bone window settings. Coronal and  sagittal reformations obtained. CTDIvol DLP 1080.37 CTDIvol mGy    CT CHEST FINDINGS:    The included portion of the thyroid is demonstrates a bilateral tiny  intrathyroid hypodensity measuring less than 5 mm, image 2 series 2..  Minimal micronodular thickening of the left major fissure at the inferior hilar  level, image 28 series 4. There is no evidence of mediastinal, hilar, nor axillary adenopathy. The great vessels and thoracic aorta are unremarkable. There are no pleural effusions. The breasts are incompletely included on this exam. Asymmetrical soft tissue  densities are seen within the medial breasts bilaterally. CT ABDOMEN FINDINGS:    The liver and spleen are normal in size and density. The biliary tree is not  dilated. There is bilateral renal function without obstruction, without cyst, stone or  mass. Adrenals and Pancreas  are of normal CT appearance. There is no free fluid or free air. The large and small bowel seem unremarkable. There are several minimally enlarged periportal nodes which are nonspecific. 1.5 x 1 cm node image 74 series 2. Portacaval node measures 1.2 x 0.6 cm. Image  75 series 2. CT PELVIS FINDINGS:    There is no free pelvic fluid. The uterus is not enlarged. There are however several small hypodense masses  within the myometrium. No abnormality of the ovaries. The bladder is incompletely distended but unremarkable. There is no significant adenopathy.     Posterior articular facet disease of the lower lumbar spine noted. Impression  :    Several minimally enlarged periportal/peter hepatis lymph nodes are  nonspecific. Followup in 6 months may be of benefit as clinically indicated. Probable small uterine fibroids or adenomyosis. Bilateral into thyroid hypodensity is nonspecific but likely benign. Minimal nodular thickening of the major fissure on the left is likely benign. Asymmetric soft tissue densities within the breasts incompletely included on  this exam.          Assessment:   DCIS Left Breast/Invasive Ductal Carcinoma Right Breast  Triple Negative Breast Cancer  Chronic Anemia  Chemotherapy Induced Peripheral neuropathy    Plan:   DCIS left breast/ Invasive ductal carcinoma right breast   Triple negative breast cancer  --B/L Mastectomy 6/2014  --The patient had completed adjuvant systemic chemotherapy, carboplatin and Taxotere. Since she had triple negative for cancer, she was not suggested on antiestrogen therapy at that time. -- Patient was being followed by Dr. Demetris Irving who retired. -- She denies any new chest issues or worsening pain. -- Clinically the patient is doing well. Recent labs reviewed with the patient. Plan:  --The patient was instructed to continue doing her chest wall and axilla exams on a monthly basis. --The patient was advised to notify us if any breast pain, lumps or bumps, breast skin changes, unintentional weight loss, worsen fatigue, new bone pain or back pain, or any concerns. - I have advised her to follow up her PCP to continue age-appropriate cancer screening. --I have educated her regarding lifestyle modifications, minimizing alcohol intake, refraining from smoking, healthy diet, and physical activity. Chronic Anemia:   -- 05/23/2022: CBC showed WBC 6.6K/uL, hemoglobin 12.6g/dL with hematocrit of 40.9%. Platelet 419. BUN and Creatinine were normal. Iron Sat 29%.  Ferritin 835  --- she has been advise to stop cooking with cast iron pans. ---- We will also check hemochromatosis mutation labs in 6 months   --We will continue to monitor    # Chemotherapy-induced peripheral neuropathy:  # Chronic pain syndrome, Arthritis:   --She has been taking Neurontin 300mg PO TID without any issues   --She was previously  referred to pain management for long-term pain control. The pain management was not covered by her insurance and she states she is fine now and does not need any new referral.     Follow up in 6 months with repeat labs or sooner if indicated.     Orders Placed This Encounter    HEMOCHROMATOSIS MUTATION     Standing Status:   Future     Standing Expiration Date:   81/3/0087    METABOLIC PANEL, COMPREHENSIVE     Standing Status:   Future     Standing Expiration Date:   6/6/2023    IRON PROFILE     Standing Status:   Future     Standing Expiration Date:   6/6/2023    FERRITIN     Standing Status:   Future     Standing Expiration Date:   6/6/2023    CBC WITH AUTOMATED DIFF     Standing Status:   Future     Standing Expiration Date:   6/6/2023         Andres Harp MD    6/6/2022      CC: Loren Holden DO

## 2022-06-16 DIAGNOSIS — T45.1X5A CHEMOTHERAPY-INDUCED PERIPHERAL NEUROPATHY (HCC): ICD-10-CM

## 2022-06-16 DIAGNOSIS — G62.0 CHEMOTHERAPY-INDUCED PERIPHERAL NEUROPATHY (HCC): ICD-10-CM

## 2022-06-17 RX ORDER — GABAPENTIN 100 MG/1
300 CAPSULE ORAL 3 TIMES DAILY
Qty: 240 CAPSULE | Refills: 1 | Status: SHIPPED | OUTPATIENT
Start: 2022-06-17

## 2022-07-17 NOTE — PROGRESS NOTES
Call the wound clinic for follow-up. Return to the ED if worsening in any way. Finish all of the antibiotics. Change your dressing daily as discussed. Hematology/Oncology  Progress Note    Name: Keyla Ogden  Date: 2017  : 1960    Celso Harrell MD     Ms. Gilford Punch is a 62 y.o. 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  Current therapy:the patient has previously completed adjuvant systemic chemotherapy      Subjective:     Ms. Gilford Punch is a 80-year-old  woman who has a history of DCIS involving the left breast an invasive ductal adenocarcinoma the right breast. The patient has done well since she completed her systemic chemotherapy. She has previous undergone bilateral mastectomies. She decided not to proceed with this reconstruction. She has no physical complaints at this time. Past medical history, family history, and social history: these were reviewed and remains unchanged. Past Medical History   Diagnosis Date    Cancer [facility])      right breast cancer     Hypercholesterolemia     Hypertension      Past Surgical History   Procedure Laterality Date    Hx cholecystectomy      Hx breast reconstruction  2014     BILATERAL BREAST RECONSTRUCTION WITH TISSUE EXPANDERS performed by Lorraine Rocha MD at [address] [place] [address] Hx mastectomy  2014     BILATERAL BREAST MASTECTOMY SIMPLE performed by Elissa Moser MD at [address] [place] [address] Hx gyn       fibroid tumors removed    Hx  section      Hx tubal ligation      Hx vascular access       left chest     Social History     Social History    Marital status:      Spouse name: N/A    Number of children: N/A    Years of education: N/A     Occupational History    Not on file.      Social History Main Topics    Smoking status: Former Smoker     Types: Cigarettes    Smokeless tobacco: Never Used      Comment: quit 2014    Alcohol use Yes      Comment: Socially    Drug use: No    Sexual activity: Not on file     Other Topics Concern    Not on file     Social History Narrative     Family History Problem Relation Age of Onset    Hypertension Mother     Diabetes Father     Diabetes Daughter     Diabetes Son      Current Outpatient Prescriptions   Medication Sig Dispense Refill    triamterene-hydroCHLOROthiazide (DYAZIDE) 37.5-25 mg per capsule Take 1 Cap by mouth daily. 90 Cap 1    lovastatin (MEVACOR) 20 mg tablet Take 1 Tab by mouth daily. 90 Tab 1    oxyCODONE-acetaminophen (PERCOCET) 5-325 mg per tablet 1 - 2 tablets every 4 -6 hours as needed for pain 120 Tab 0    gabapentin (NEURONTIN) 100 mg capsule Take 2 Caps by mouth three (3) times daily. 180 Cap 11    ibuprofen (MOTRIN) 800 mg tablet Take 1 Tab by mouth three (3) times daily as needed for Pain. 40 Tab 0    ferrous sulfate (IRON) 325 mg (65 mg iron) tablet Take 325 mg by mouth Daily (before breakfast). Indications: IRON DEFICIENCY ANEMIA         Review of Systems  Constitutional: The patient has no acute distress or discomfort. HEENT: The patient denies recent head trauma, eye pain, blurred vision,  hearing deficit, oropharyngeal mucosal pain or lesions, and the patient denies throat pain or discomfort. Lymphatics: The patient denies palpable peripheral lymphadenopathy. Hematologic: The patient denies having bruising, bleeding, or progressive fatigue. Respiratory: Patient denies having shortness of breath, cough, sputum production, fever, or dyspnea on exertion. Cardiovascular: The patient denies having leg pain, leg swelling, heart palpitations, chest permit, chest pain, or lightheadedness. The patient denies having dyspnea on exertion. Gastrointestinal: The patient denies having nausea, emesis, or diarrhea. The patient denies having any hematemesis or blood in the stool. Genitourinary: Patient denies having urinary urgency, frequency, or dysuria. The patient denies having blood in the urine. Psychological: The patient denies having symptoms of nervousness, anxiety, depression, or thoughts of harming self.   Skin: Patient denies having skin rashes, skin, ulcerations, or unexplained itching or pruritus. Musculoskeletal: The patient denies having pain in the joints or bones. Objective:     Visit Vitals    /84    Pulse 77    Temp 98.1 °F (36.7 °C)    Ht 5' 4\" (1.626 m)    Wt 113.4 kg (250 lb)    BMI 42.91 kg/m2     ECOG PS=0, pain score=0/10   Physical Exam:   Gen. Appearance: The patient is in no acute distress. Skin: There is no bruise or rash. HEENT: The exam is unremarkable. Neck: Supple without lymphadenopathy or thyromegaly. Lungs: Clear to auscultation and percussion; there are no wheezes or rhonchi. Heart: Regular rate and rhythm; there are no murmurs, gallops, or rubs. Abdomen: Bowel sounds are present and normal.  There is no guarding, tenderness, or hepatosplenomegaly. Extremities: There is no clubbing, cyanosis, or edema. Neurologic: There are no focal neurologic deficits. Lymphatics: There is no palpable peripheral lymphadenopathy. Musculoskeletal: The patient has full range of motion at all joints. There is no evidence of joint deformity or effusions. There is no focal joint tenderness. Psychological/psychiatric: There is no clinical evidence of anxiety, depression, or melancholy. Lab data:      Results for orders placed or performed during the hospital encounter of 01/17/17   CBC WITH 3 PART DIFF     Status: Abnormal   Result Value Ref Range Status    WBC 6.9 4.5 - 13.0 K/uL Final    RBC 5.25 (H) 4.10 - 5.10 M/uL Final    HGB 12.6 12.0 - 16.0 g/dL Final    HCT 39.8 36 - 48 % Final    MCV 75.8 (L) 78 - 102 FL Final    MCH 24.0 (L) 25.0 - 35.0 PG Final    MCHC 31.7 31 - 37 g/dL Final    RDW 13.6 11.5 - 14.5 % Final    PLATELET 491 502 - 697 K/uL Final    NEUTROPHILS 58 40 - 70 % Final    MIXED CELLS 3 0.1 - 17 % Final    LYMPHOCYTES 39 14 - 44 % Final    ABS. NEUTROPHILS 4.0 1.8 - 9.5 K/UL Final    ABS. MIXED CELLS 0.2 0.0 - 2.3 K/uL Final    ABS.  LYMPHOCYTES 2.7 1.1 - 5.9 K/UL Final Comment: Test performed at [place] [address] Location. Results Reviewed by Medical Director. DF AUTOMATED   Final           Assessment:     1. DCIS (ductal carcinoma in situ) of breast, unspecified laterality    2. Ductal carcinoma in situ (DCIS) of left breast    3. Invasive ductal carcinoma of right breast, stage 1 (HCC)    4. Neuropathy    5. Arthritis      Plan:   DCIS left breast/invasive ductal carcinoma right breast the patient is doing well and clinically there is no evidence of disease recurrence. Have instructed the patient to continue doing her self breast exam on a monthly basis. Chemotherapy-induced peripheral neuropathy: I have instructed the patient to continue taking the Neurontin 200 mg 3 times daily. She also takes Percocet 5 mg every 4-6 hours p.r.n. As well. Additionally, she takes Motrin 800 mg 3 times daily with food. She will continue to take these medications as previously noted. Arthritis: I have instructed the patient to continue using her Motrin and exercises as needed for arthritis. She also takes the Percocet 5 mg every 4-6 hours as well and this offers some benefit. Follow-up in 3 months  Orders Placed This Encounter    COMPLETE CBC & AUTO DIFF WBC    InHouse CBC ([facility])     Standing Status:   Future     Number of Occurrences:   1     Standing Expiration Date:   1/24/2017    CA 27.29     Standing Status:   Future     Standing Expiration Date:   6/60/6180    METABOLIC PANEL, COMPREHENSIVE     Standing Status:   Future     Standing Expiration Date:   1/18/2018       Rubin Vee MD  1/17/2017      Please note: This document has been produced using voice recognition software. Unrecognized errors in transcription may be present.

## 2022-11-23 ENCOUNTER — APPOINTMENT (OUTPATIENT)
Dept: ONCOLOGY | Age: 62
End: 2022-11-23

## 2022-11-23 DIAGNOSIS — D64.9 CHRONIC ANEMIA: ICD-10-CM

## 2022-11-23 DIAGNOSIS — G62.0 CHEMOTHERAPY-INDUCED PERIPHERAL NEUROPATHY (HCC): ICD-10-CM

## 2022-11-23 DIAGNOSIS — D05.12 DUCTAL CARCINOMA IN SITU (DCIS) OF LEFT BREAST: ICD-10-CM

## 2022-11-23 DIAGNOSIS — G62.9 NEUROPATHY: ICD-10-CM

## 2022-11-23 DIAGNOSIS — C50.919 TRIPLE NEGATIVE MALIGNANT NEOPLASM OF BREAST (HCC): ICD-10-CM

## 2022-11-23 DIAGNOSIS — T45.1X5A CHEMOTHERAPY-INDUCED PERIPHERAL NEUROPATHY (HCC): ICD-10-CM

## 2022-11-23 DIAGNOSIS — D05.10 DUCTAL CARCINOMA IN SITU (DCIS) OF BREAST, UNSPECIFIED LATERALITY: ICD-10-CM

## 2022-12-07 ENCOUNTER — OFFICE VISIT (OUTPATIENT)
Dept: ONCOLOGY | Age: 62
End: 2022-12-07
Payer: COMMERCIAL

## 2022-12-07 VITALS
OXYGEN SATURATION: 96 % | BODY MASS INDEX: 37.36 KG/M2 | RESPIRATION RATE: 16 BRPM | SYSTOLIC BLOOD PRESSURE: 109 MMHG | DIASTOLIC BLOOD PRESSURE: 73 MMHG | HEIGHT: 64 IN | HEART RATE: 61 BPM | WEIGHT: 218.8 LBS

## 2022-12-07 DIAGNOSIS — D05.10 DUCTAL CARCINOMA IN SITU (DCIS) OF BREAST, UNSPECIFIED LATERALITY: ICD-10-CM

## 2022-12-07 DIAGNOSIS — D05.12 DUCTAL CARCINOMA IN SITU (DCIS) OF LEFT BREAST: ICD-10-CM

## 2022-12-07 DIAGNOSIS — D64.9 CHRONIC ANEMIA: ICD-10-CM

## 2022-12-07 DIAGNOSIS — G62.0 CHEMOTHERAPY-INDUCED PERIPHERAL NEUROPATHY (HCC): ICD-10-CM

## 2022-12-07 DIAGNOSIS — T45.1X5A CHEMOTHERAPY-INDUCED PERIPHERAL NEUROPATHY (HCC): ICD-10-CM

## 2022-12-07 DIAGNOSIS — C50.919 TRIPLE NEGATIVE MALIGNANT NEOPLASM OF BREAST (HCC): ICD-10-CM

## 2022-12-07 DIAGNOSIS — G62.9 NEUROPATHY: ICD-10-CM

## 2022-12-07 DIAGNOSIS — D05.10 DUCTAL CARCINOMA IN SITU (DCIS) OF BREAST, UNSPECIFIED LATERALITY: Primary | ICD-10-CM

## 2022-12-07 PROCEDURE — 3074F SYST BP LT 130 MM HG: CPT | Performed by: INTERNAL MEDICINE

## 2022-12-07 PROCEDURE — 99213 OFFICE O/P EST LOW 20 MIN: CPT | Performed by: INTERNAL MEDICINE

## 2022-12-07 PROCEDURE — 3078F DIAST BP <80 MM HG: CPT | Performed by: INTERNAL MEDICINE

## 2022-12-07 NOTE — PROGRESS NOTES
Hematology/Oncology  Progress Note    Name: Koko Cowan  Date: 2022  : 1960    PCP: Melyssa Eaton DO    Ms. Carrie Rivas is a 58 y.o.  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    --B/L Mastectomy 2014  --The patient completed adjuvant systemic chemotherapy  --Presently on surveillance    Subjective:   Ms. Koko Cowan is a 58 y.o. 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 She underwent B/L mastectomies in . Today she denies any fevers, chills, recurrent infections, and skin rash. She denies any shortness of breath, dizziness, chest pains, weakness, and fatigue. She denies abdominal pain, nausea, vomiting, diarrhea, or constipation. She denies acute vision changes, headaches, and cough. She denies pain or any discomfort. She does not have any concerns or complaints to report at this time. Past medical history, family history, and social history: these were reviewed and remains unchanged.     Past Medical History:   Diagnosis Date    Arthritis     Cancer [facility])     right breast cancer     DDD (degenerative disc disease), cervical 2017    Hypercholesterolemia     Hypertension     Neuropathy      Past Surgical History:   Procedure Laterality Date    COLONOSCOPY N/A 2021    COLONOSCOPY performed by Emiliano Wood MD at [address]  2014    BILATERAL BREAST RECONSTRUCTION WITH TISSUE EXPANDERS performed by David Lei MD at [facility] MAIN OR     [facility]       [facility]      HX GYN      fibroid tumors removed    HX MASTECTOMY  2014    BILATERAL BREAST MASTECTOMY SIMPLE performed by Kirit Suresh MD at [facility] MAIN OR    HX TUBAL LIGATION      HX VASCULAR ACCESS      left chest pac- removed      Social History     Socioeconomic History    Marital status:  Spouse name: Not on file    Number of children: Not on file    Years of education: Not on file    Highest education level: Not on file   Occupational History    Not on file   Tobacco Use    Smoking status: Former     Types: Cigarettes     Quit date: 2014     Years since quittin.5    Smokeless tobacco: Never   Vaping Use    Vaping Use: Never used   Substance and Sexual Activity    Alcohol use: Not Currently     Comment: Socially    Drug use: No    Sexual activity: Not Currently   Other Topics Concern    Not on file   Social History Narrative    Not on file     Social Determinants of Health     Financial Resource Strain: Not on file   Food Insecurity: Not on file   Transportation Needs: Not on file   Physical Activity: Not on file   Stress: Not on file   Social Connections: Not on file   Intimate Partner Violence: Not on file   Housing Stability: Not on file     Family History   Problem Relation Age of Onset    Hypertension Mother     Diabetes Father     Diabetes Daughter     Diabetes Son      Current Outpatient Medications   Medication Sig Dispense Refill    gabapentin (NEURONTIN) 100 mg capsule Take 3 Capsules by mouth three (3) times daily. Max Daily Amount: 900 mg. Indications: neuropathic pain 240 Capsule 1    meloxicam (MOBIC) 15 mg tablet Take 1 Tab by mouth daily. 30 Tab 0    lovastatin (MEVACOR) 20 mg tablet Take 1 Tab by mouth daily. 90 Tab 1    triamterene-hydroCHLOROthiazide (DYAZIDE) 37.5-25 mg per capsule Take 1 Cap by mouth daily. 90 Cap 1       Review of Systems   Constitutional:  Negative for chills, diaphoresis, fever, malaise/fatigue and weight loss. Respiratory:  Negative for cough, hemoptysis, shortness of breath and wheezing. Cardiovascular:  Negative for chest pain, palpitations and leg swelling. Gastrointestinal:  Negative for abdominal pain, diarrhea, heartburn, nausea and vomiting. Genitourinary:  Negative for dysuria, frequency, hematuria and urgency.    Musculoskeletal: Negative for joint pain and myalgias. Skin:  Negative for itching and rash. Neurological:  Negative for dizziness, tingling, seizures, weakness and headaches. Psychiatric/Behavioral:  Negative for depression. The patient does not have insomnia. Objective:     Visit Vitals  /73   Pulse 61   Resp 16   Ht 5' 4\" (1.626 m)   Wt 99.2 kg (218 lb 12.8 oz)   SpO2 96%   BMI 37.56 kg/m²       ECOG Performance Status (grade): 0  0 - able to carry on all pre-disease activity w/out restriction  1 - restricted but able to carry out light work  2 - ambulatory and can self- care but unable to carry out work  3 - bed or chair >50% of waking hours  4 - completely disable, total care, confined to bed or chair    Physical Exam  Constitutional:       Appearance: Normal appearance. HENT:      Head: Normocephalic and atraumatic. Eyes:      Pupils: Pupils are equal, round, and reactive to light. Cardiovascular:      Rate and Rhythm: Normal rate and regular rhythm. Heart sounds: Normal heart sounds. Pulmonary:      Effort: Pulmonary effort is normal.      Breath sounds: Normal breath sounds. Abdominal:      General: Bowel sounds are normal.      Palpations: Abdomen is soft. Tenderness: There is no abdominal tenderness. There is no guarding. Musculoskeletal:         General: Normal range of motion. Cervical back: Neck supple. Right lower leg: No edema. Left lower leg: No edema. Skin:     General: Skin is warm. Neurological:      General: No focal deficit present. Mental Status: She is alert and oriented to person, place, and time. Mental status is at baseline. Anterior chest wall and breast: Clinically there is no evidence of disease recurrence. Examination of the chest wall and breast shows no mass, no tenderness or abnormality. The axilla reveals no palpable axillary lymphadenopathy.     Diagnostics:      No results found for this or any previous visit (from the past 96 hour(s)). Imaging:  No results found for this or any previous visit. Results for orders placed during the hospital encounter of 06/26/17    XR SPINE CERV PA LAT ODONT 3 V MAX    Narrative  Cervical Spine AP And Lateral    CPT CODE: 76680    HISTORY: , neck pain, left arm pain with numbness and tingling. COMPARISON: None. FINDINGS:    Two views obtained. The lateral view demonstrates from skull base to T1. The  vertebra are normal in height and alignment. The disc spaces are moderately  narrowed from C3 through C6 with ventral spurring. The prevertebral soft  tissues are normal.  There is no evidence of fracture or dislocation. Impression  IMPRESSION:    Multilevel moderate degenerative disc disease of the mid to lower cervical spine      Results for orders placed during the hospital encounter of 07/08/14    CT CHEST ABD PELV W CONT    Narrative  CT  CHEST, ABDOMEN AND PELVIS WITH CONTRAST    CPT CODE: [id number], [id number], [id number]    INDICATION: New diagnosis breast cancer, staging, BREAST CANCER. COMPARISON: Mammogram 6/10/14. TECHNIQUE:  Standard helical images were obtained from the thoracic inlet  through the inferior pubic rami at 5 mm thick sections following the  intravenous injection of a bolus of  100 cc nonionic contrast.  Images were  reviewed on both soft tissue, lung, and bone window settings. Coronal and  sagittal reformations obtained. CTDIvol DLP 1080.37 CTDIvol mGy    CT CHEST FINDINGS:    The included portion of the thyroid is demonstrates a bilateral tiny  intrathyroid hypodensity measuring less than 5 mm, image 2 series 2..  Minimal micronodular thickening of the left major fissure at the inferior hilar  level, image 28 series 4. There is no evidence of mediastinal, hilar, nor axillary adenopathy. The great vessels and thoracic aorta are unremarkable. There are no pleural effusions.     The breasts are incompletely included on this exam. Asymmetrical soft tissue  densities are seen within the medial breasts bilaterally. CT ABDOMEN FINDINGS:    The liver and spleen are normal in size and density. The biliary tree is not  dilated. There is bilateral renal function without obstruction, without cyst, stone or  mass. Adrenals and Pancreas  are of normal CT appearance. There is no free fluid or free air. The large and small bowel seem unremarkable. There are several minimally enlarged periportal nodes which are nonspecific. 1.5 x 1 cm node image 74 series 2. Portacaval node measures 1.2 x 0.6 cm. Image  75 series 2. CT PELVIS FINDINGS:    There is no free pelvic fluid. The uterus is not enlarged. There are however several small hypodense masses  within the myometrium. No abnormality of the ovaries. The bladder is incompletely distended but unremarkable. There is no significant adenopathy. Posterior articular facet disease of the lower lumbar spine noted. Impression  :    Several minimally enlarged periportal/peter hepatis lymph nodes are  nonspecific. Followup in 6 months may be of benefit as clinically indicated. Probable small uterine fibroids or adenomyosis. Bilateral into thyroid hypodensity is nonspecific but likely benign. Minimal nodular thickening of the major fissure on the left is likely benign. Asymmetric soft tissue densities within the breasts incompletely included on  this exam.          Assessment:   DCIS Left Breast/Invasive Ductal Carcinoma Right Breast  Triple Negative Breast Cancer  Chronic Anemia  Chemotherapy Induced Peripheral neuropathy    Plan:   DCIS left breast/ Invasive ductal carcinoma right breast   Triple negative breast cancer  --B/L Mastectomy 6/2014  --The patient had completed adjuvant systemic chemotherapy, carboplatin and Taxotere. Since she had triple negative for cancer, she was not suggested on antiestrogen therapy at that time. -- Patient was being followed by Dr. Maricarmen Chamberlain who retired.    -- She denies any new chest issues or worsening pain. -- Clinically the patient is doing well. Recent labs reviewed with the patient. Plan:  --The patient was instructed to continue doing her chest wall and axilla exams on a monthly basis. --The patient was advised to notify us if any breast pain, lumps or bumps, breast skin changes, unintentional weight loss, worsen fatigue, new bone pain or back pain, or any concerns. - I have advised her to follow up her PCP to continue age-appropriate cancer screening. --I have educated her regarding lifestyle modifications, minimizing alcohol intake, refraining from smoking, healthy diet, and physical activity. Chronic Anemia:   -- 11/23/2022: CBC showed WBC 4.6K/uL, hemoglobin 12.2g/dL with hematocrit of 39.5%. Platelet 299. BUN and Creatinine were normal. Iron Sat 46%. Ferritin 740  -- she has been advise to stop cooking with cast iron pans. Hemochromatosis mutation was not detected on 11/23/2022    -- We will continue to monitor CBC    # Chemotherapy-induced peripheral neuropathy:  # Chronic pain syndrome, Arthritis:   --She has been taking Neurontin 300mg PO TID without any issues   -- She was previously  referred to pain management for long-term pain control. The pain management was not covered by her insurance and she states she is fine now and does not need any new referral.     Follow up in 6 months with repeat labs or sooner if indicated. No orders of the defined types were placed in this encounter.         Awilda Vela MD    12/7/2022      CC: Jarad Gay DO

## 2022-12-12 DIAGNOSIS — T45.1X5A CHEMOTHERAPY-INDUCED PERIPHERAL NEUROPATHY (HCC): ICD-10-CM

## 2022-12-12 DIAGNOSIS — G62.0 CHEMOTHERAPY-INDUCED PERIPHERAL NEUROPATHY (HCC): ICD-10-CM

## 2022-12-14 RX ORDER — GABAPENTIN 100 MG/1
300 CAPSULE ORAL 3 TIMES DAILY
Qty: 240 CAPSULE | Refills: 1 | Status: SHIPPED | OUTPATIENT
Start: 2022-12-14

## 2023-02-04 DIAGNOSIS — T45.1X5A CHEMOTHERAPY-INDUCED PERIPHERAL NEUROPATHY (HCC): Primary | ICD-10-CM

## 2023-02-04 DIAGNOSIS — D05.10 DUCTAL CARCINOMA IN SITU (DCIS) OF BREAST, UNSPECIFIED LATERALITY: ICD-10-CM

## 2023-02-04 DIAGNOSIS — D64.9 CHRONIC ANEMIA: ICD-10-CM

## 2023-02-04 DIAGNOSIS — G62.0 CHEMOTHERAPY-INDUCED PERIPHERAL NEUROPATHY (HCC): Primary | ICD-10-CM

## 2023-02-05 DIAGNOSIS — D05.10 DUCTAL CARCINOMA IN SITU (DCIS) OF BREAST, UNSPECIFIED LATERALITY: ICD-10-CM

## 2023-02-05 DIAGNOSIS — G62.0 CHEMOTHERAPY-INDUCED PERIPHERAL NEUROPATHY (HCC): Primary | ICD-10-CM

## 2023-02-05 DIAGNOSIS — D64.9 CHRONIC ANEMIA: ICD-10-CM

## 2023-02-05 DIAGNOSIS — T45.1X5A CHEMOTHERAPY-INDUCED PERIPHERAL NEUROPATHY (HCC): Primary | ICD-10-CM

## 2023-02-06 DIAGNOSIS — G62.0 CHEMOTHERAPY-INDUCED PERIPHERAL NEUROPATHY (HCC): Primary | ICD-10-CM

## 2023-02-06 DIAGNOSIS — D05.10 DUCTAL CARCINOMA IN SITU (DCIS) OF BREAST, UNSPECIFIED LATERALITY: ICD-10-CM

## 2023-02-06 DIAGNOSIS — T45.1X5A CHEMOTHERAPY-INDUCED PERIPHERAL NEUROPATHY (HCC): Primary | ICD-10-CM

## 2023-02-06 DIAGNOSIS — D64.9 CHRONIC ANEMIA: ICD-10-CM

## 2023-02-28 ENCOUNTER — HOSPITAL ENCOUNTER (EMERGENCY)
Facility: HOSPITAL | Age: 63
Discharge: HOME | End: 2023-02-28
Attending: EMERGENCY MEDICINE
Payer: COMMERCIAL

## 2023-02-28 VITALS
OXYGEN SATURATION: 100 % | BODY MASS INDEX: 36.19 KG/M2 | HEART RATE: 67 BPM | SYSTOLIC BLOOD PRESSURE: 144 MMHG | DIASTOLIC BLOOD PRESSURE: 84 MMHG | WEIGHT: 212 LBS | RESPIRATION RATE: 16 BRPM | HEIGHT: 64 IN | TEMPERATURE: 98.2 F

## 2023-02-28 DIAGNOSIS — M54.32 SCIATICA OF LEFT SIDE: Primary | ICD-10-CM

## 2023-02-28 PROCEDURE — 99283 EMERGENCY DEPT VISIT LOW MDM: CPT | Performed by: EMERGENCY MEDICINE

## 2023-02-28 RX ORDER — METHOCARBAMOL 500 MG/1
500 TABLET, FILM COATED ORAL 3 TIMES DAILY
Qty: 15 TABLET | Refills: 0 | Status: SHIPPED | OUTPATIENT
Start: 2023-02-28 | End: 2023-03-05

## 2023-02-28 RX ORDER — KETOROLAC TROMETHAMINE 10 MG/1
10 TABLET, FILM COATED ORAL EVERY 6 HOURS PRN
Qty: 20 TABLET | Refills: 0 | Status: SHIPPED | OUTPATIENT
Start: 2023-02-28

## 2023-02-28 RX ORDER — PREDNISONE 50 MG/1
50 TABLET ORAL DAILY
Qty: 5 TABLET | Refills: 0 | Status: SHIPPED | OUTPATIENT
Start: 2023-02-28 | End: 2023-03-05

## 2023-02-28 ASSESSMENT — ENCOUNTER SYMPTOMS
ABDOMINAL DISTENTION: 0
DIARRHEA: 0
EYE DISCHARGE: 0
NAUSEA: 0
VOMITING: 0
BACK PAIN: 1
SORE THROAT: 0
SHORTNESS OF BREATH: 0
WHEEZING: 0

## 2023-02-28 ASSESSMENT — PAIN - FUNCTIONAL ASSESSMENT: PAIN_FUNCTIONAL_ASSESSMENT: 0-10

## 2023-02-28 ASSESSMENT — PAIN SCALES - GENERAL: PAINLEVEL_OUTOF10: 7

## 2023-02-28 NOTE — ED NOTES
Discharge instructions reviewed with patient. Patient verbalized understanding. Patient advised to follow up as directed on discharge instructions. Patient denies questions, needs or concerns at this time. No s/sx of distress noted.         Nata Rosenberg RN  02/28/23 [id number]

## 2023-02-28 NOTE — ED PROVIDER NOTES
EMERGENCY DEPARTMENT HISTORY AND PHYSICAL EXAM    Date: 2/28/2023  Patient Name: Zack Ricks    History of Presenting Illness     Chief Complaint   Patient presents with    Hip Pain         History Provided By: Patient      Additional History (Context): Zack Ricks is a 61 y.o. female with a history of hypertension and breast cancer who presents today for left lower back pain that radiates into the left hip and left lower leg. Patient denies any recent trauma or injury. Denies history of this in the past.  Denies any known history of back problems or surgeries. Denies any recent fevers or chills. Denies any urinary complaints. Denies any loss of bowel or bladder. Has not tried thing for this at home. Reports that she has been walking on a treadmill 3 days a week which is somewhat new for her      PCP: [facility], DO    No current facility-administered medications for this encounter.      Current Outpatient Medications   Medication Sig Dispense Refill    predniSONE (DELTASONE) 50 MG tablet Take 1 tablet by mouth daily for 5 days 5 tablet 0    methocarbamol (ROBAXIN) 500 MG tablet Take 1 tablet by mouth 3 times daily for 5 days 15 tablet 0    ketorolac (TORADOL) 10 MG tablet Take 1 tablet by mouth every 6 hours as needed for Pain 20 tablet 0       Past History     Past Medical History:  Past Medical History:   Diagnosis Date    Arthritis     Cancer ([address] 2014    right breast cancer     DDD (degenerative disc disease), cervical 06/2017    Hypercholesterolemia     Hypertension     Neuropathy        Past Surgical History:  Past Surgical History:   Procedure Laterality Date    BREAST RECONSTRUCTION  8/13/2014    BILATERAL BREAST RECONSTRUCTION WITH TISSUE EXPANDERS performed by Stella Chamorro MD at [address] [place] [address]      COLONOSCOPY N/A 2/26/2021    COLONOSCOPY performed by Louie Paul MD at [facility]COPY    GYN      fibroid tumors removed    MASTECTOMY 2014    BILATERAL BREAST MASTECTOMY SIMPLE performed by Scott Alvarez MD at [facility]      left chest pac- removed 2015       Family History:  Family History   Problem Relation Age of Onset    Hypertension Mother     Diabetes Son     Diabetes Daughter     Diabetes Father        Social History:  Social History     Tobacco Use    Smoking status: Former     Types: Cigarettes     Quit date: 2014     Years since quittin.7    Smokeless tobacco: Never   Substance Use Topics    Alcohol use: Not Currently    Drug use: No       Allergies:  No Known Allergies      Review of Systems   Review of Systems   Constitutional:  Negative for chills, diaphoresis and fever. HENT:  Negative for congestion and sore throat. Eyes:  Negative for discharge. Respiratory:  Negative for shortness of breath and wheezing. Cardiovascular:  Negative for chest pain. Gastrointestinal:  Negative for abdominal distention, diarrhea, nausea and vomiting. Genitourinary:  Negative for dysuria and urgency. Musculoskeletal:  Positive for back pain. Negative for arthralgias. Skin:  Negative for rash. Neurological:  Negative for headaches. Psychiatric/Behavioral:  Negative for behavioral problems. The patient is not nervous/anxious. All other systems reviewed and are negative. All Other Systems Negative  Physical Exam     Vitals:    23 1156   BP: (!) 144/84   Pulse: 67   Resp: 16   Temp: 98.2 °F (36.8 °C)   TempSrc: Oral   SpO2: 100%   Weight: 212 lb (96.2 kg)   Height: 5' 4\" (1.626 m)     Physical Exam  Constitutional:       General: She is not in acute distress. Appearance: She is normal weight. Comments: Well-appearing, no distress   HENT:      Head: Atraumatic. Nose: Nose normal.      Mouth/Throat:      Mouth: Mucous membranes are moist.   Eyes:      Extraocular Movements: Extraocular movements intact.       Pupils: Pupils are equal, round, and reactive to light. Cardiovascular:      Rate and Rhythm: Normal rate. Pulmonary:      Effort: Pulmonary effort is normal.      Breath sounds: Normal breath sounds. Abdominal:      General: There is no distension. Tenderness: There is no abdominal tenderness. Musculoskeletal:      Cervical back: Normal range of motion. Lumbar back: Tenderness present. No swelling, edema, deformity or signs of trauma. Normal range of motion. Positive left straight leg raise test.      Comments: Left Lower  paralumbar reproducible tenderness to palpation. No Lumbar midline TTP. No other midline vertebral bony point tenderness or step-off. No foot drop. Distal sensation intact bilaterally, normal gait, no saddle anesthesia. Bilateral DP 3+. + Left straight leg raise. Neurological:      General: No focal deficit present. Mental Status: She is alert and oriented to person, place, and time. Mental status is at baseline. Psychiatric:         Thought Content: Thought content normal.         Diagnostic Study Results     Labs -   No results found for this or any previous visit (from the past 12 hour(s)). Radiologic Studies -   No orders to display           Medical Decision Making   I am the first provider for this patient. I reviewed the vital signs, available nursing notes, past medical history, past surgical history, family history and social history. Vital Signs-Reviewed the patient's vital signs. Records Reviewed: Nursing Notes and Old Medical Records     Procedures: None   Procedures    Provider Notes (Medical Decision Making):     Differential Diagnosis: Musculoskeletal pain, myofascial strain/sprain, muscle spasm, spondylolisthesis, spondylosis, DJD, OA, sciatica, cauda equina syndrome    Plan: History and physical exam consistent with sciatica. No red flag signs or emergent need for imaging at this time. No midline tenderness. Will discharge home with pain medication and muscle relaxants.   Have stressed the importance of stretching and hot baths with Epsom salt. Have advised orthopedic follow-up. Patient agrees with the plan and management and states all questions have been thoroughly answered and there are no more remaining questions. MED RECONCILIATION:  No current facility-administered medications for this encounter. Current Outpatient Medications   Medication Sig    predniSONE (DELTASONE) 50 MG tablet Take 1 tablet by mouth daily for 5 days    methocarbamol (ROBAXIN) 500 MG tablet Take 1 tablet by mouth 3 times daily for 5 days    ketorolac (TORADOL) 10 MG tablet Take 1 tablet by mouth every 6 hours as needed for Pain       Disposition:  Home     DISCHARGE NOTE:   Pt has been reexamined. Patient has no new complaints, changes, or physical findings. Care plan outlined and precautions discussed. Results of workup were reviewed with the patient. All medications were reviewed with the patient. All of pt's questions and concerns were addressed. Patient was instructed and agrees to follow up with PCP/Spine as well as to return to the ED upon further deterioration. Patient is ready to go home. Medication List        START taking these medications      ketorolac 10 MG tablet  Commonly known as: TORADOL  Take 1 tablet by mouth every 6 hours as needed for Pain     methocarbamol 500 MG tablet  Commonly known as: Robaxin  Take 1 tablet by mouth 3 times daily for 5 days     predniSONE 50 MG tablet  Commonly known as: DELTASONE  Take 1 tablet by mouth daily for 5 days               Where to Get Your Medications        These medications were sent to [facility]  [address], [address] [place] [address]      Phone: [phone]   ketorolac 10 MG tablet  methocarbamol 500 MG tablet  predniSONE 50 MG tablet             Diagnosis     Clinical Impression:   1.  Sciatica of left side          \"Please note that this dictation was completed with [employer], the [employer] voice recognition software. Quite often unanticipated grammatical, syntax, homophones, and other interpretive errors are inadvertently transcribed by the computer software. Please disregard these errors. Please excuse any errors that have escaped final proofreading. \"     KATELYN Buck  02/28/23 [address]  02/28/23 Ayad Jimenez, [place]  02/28/23 7686

## 2023-03-27 ENCOUNTER — HOSPITAL ENCOUNTER (OUTPATIENT)
Facility: HOSPITAL | Age: 63
Setting detail: RECURRING SERIES
Discharge: HOME | End: 2023-03-30
Payer: COMMERCIAL

## 2023-03-27 PROCEDURE — 97161 PT EVAL LOW COMPLEX 20 MIN: CPT

## 2023-03-27 NOTE — PROGRESS NOTES
PT DAILY TREATMENT NOTE/LUMBAR EVAL     Patient Name: Jin Kelly    Date: 3/27/2023    : 1960  Insurance: Payor: Erin Maher / Plan: [employer] / Product Type: *No Product type* /      Patient  verified yes     Visit #   Current / Total 1 15   Time   In / Out 9:20 am 9:50 am   Pain   In / Out 7/10 8.5-9/10   Subjective Functional Status/Changes: Pt agreeable to initial evaluation    Changes to:  Meds, Allergies, Med Hx, Sx Hx? If yes, update Summary List See pt's summary list          Treatment Area: Other low back pain [M54.59]  SUBJECTIVE  Pain Level (0-10 scale): 7/10  []constant []intermittent []improving []worsening []no change since onset    Any medication changes, allergies to medications, adverse drug reactions, diagnosis change, or new procedure performed?: [x] No    [] Yes (see summary sheet for update)  Subjective functional status/changes:     Chief Complaint: low back pain that radiates to left LE   History/Mechanism of Injury: Pt reports pain worsening in February- no known mechanism of injury. Pt reports she went to ER  \"it had been hurting a week  befrore I went there\"  Current Symptoms/Deficits: pain with standing for prolonged period-trying to get pressure off of left LE, pain with walking duration depending on severity of  pain   Pain-  Current: 7/10     Worst: 10/10   Best: 4-5/10  Previous Treatment/Compliance: Pt reports no prior treatement  Mobility Devices: none   PMHx/Surgical Hx: breast cancer (6 mo check ups), HBP, arthritis    PLOF: occasional LBP with left sided symptoms. Functionally indep. Diagnostic Tests: [] Lab work [] X-rays    [] CT [] MRI     [] Other:  Results: Pt reports none     Work Hx: Housewife   Living Situation: Pt resides with spouse and grandkids   Household Modifications: none  Hobbies: \"I like to shop\"  Pt Goals: \"be pain free\"    General Health:  Red Flags Indicated?  [] Yes    [] No  [] Yes [x] No Recent weight change (If

## 2023-03-27 NOTE — PROGRESS NOTES
[facility] PHYSICAL THERAPY AT [facility]  [address] [facility], [facility], [address] Phone: [phone] Fax [phone]  Plan of Care / Statement of Necessity for Physical Therapy Services     Patient Name: Kike Ricketts : 1960   Treatment   Diagnosis: M54.42  LUMBAGO WITH SCIATICA, LEFT SIDE Medical Diagnosis: Other low back pain [M54.59]   Onset Date: Worsening 2023 Payor Source: Payor: Jen Barnes / Plan: Sen Harper / Product Type: *No Product type* /    Referral Source: Malorie Tom DO Start of Care [facility]): 3/27/2023   Prior Hospitalization: See medical history Provider #: [id number]   Prior Level of Function: occasional LBP with left sided symptoms. Functionally indep. Work Hx: Housewife   Living Situation: Pt resides with spouse and grandkids    Comorbidities: breast cancer (6 mo check ups), HBP, arthritis     Medications: Verified on Patient Summary List     Assessment / key information:    Patient is a 61 y.o. yo female who presents to [facility] [place] with diagnosis of Other low back pain [M54.59]. Patient reports chief complaint of low back pain that radiates to left LE . Mechanism of Injury: Pt reports pain worsening in February- no known mechanism of injury. Pt reports she went to ER  \"it had been hurting a week  befrore I went there\". Upon objective evaluation, the patient demonstrates the following impairments: pain with trunk AROM in extension, left side bending, pain with left rotations, decreased left hip flexor strength, decreased bilat hip adduction, extension, and IR strength, left hip flexor tightness and slight left piriformis tightness, impaired gait. Patient scored 43 on lumbar FOTO indicating severe decreased function and quality of life.  Functional limitations include pain with standing for prolonged period-trying to get pressure off of left LE, pain with walking duration

## 2023-04-03 ENCOUNTER — HOSPITAL ENCOUNTER (OUTPATIENT)
Facility: HOSPITAL | Age: 63
Setting detail: RECURRING SERIES
Discharge: HOME | End: 2023-04-06
Payer: COMMERCIAL

## 2023-04-03 PROCEDURE — 97112 NEUROMUSCULAR REEDUCATION: CPT

## 2023-04-03 PROCEDURE — 97110 THERAPEUTIC EXERCISES: CPT

## 2023-04-03 NOTE — PROGRESS NOTES
PHYSICAL / OCCUPATIONAL THERAPY - DAILY TREATMENT NOTE (updated )    Patient Name: Zeferino Granda    Date: 4/3/2023    : 1960  Insurance: Payor: Lamin Saavedra / Plan: [employer] / Product Type: *No Product type* /      Patient  verified Yes     Visit #   Current / Total 2 15   Time   In / Out 8:58 9:30   Pain   In / Out 0 6   Subjective Functional Status/Changes: I'm not in any pain at the moment. Changes to:  Meds, Allergies, Med Hx, Sx Hx? If yes, update Summary List no       TREATMENT AREA =  Other low back pain [M54.59]    OBJECTIVE         Therapeutic Procedures: Tx Min Billable or 1:1 Min (if diff from Tx Min) Procedure, Rationale, Specifics     63668 Therapeutic Exercise (timed):  increase ROM, strength, coordination, balance, and proprioception to improve patient's ability to progress to PLOF and address remaining functional goals. (see flow sheet as applicable)     Details if applicable:         98793 Neuromuscular Re-Education (timed):  improve balance, coordination, kinesthetic sense, posture, core stability and proprioception to improve patient's ability to develop conscious control of individual muscles and awareness of position of extremities in order to progress to PLOF and address remaining functional goals.  (see flow sheet as applicable)     Details if applicable:                      [facility] Totals Reminder: bill using total billable min of TIMED therapeutic procedures (example: do not include dry needle or estim unattended, both untimed codes, in totals to left)  8-22 min = 1 unit; 23-37 min = 2 units; 38-52 min = 3 units; 53-67 min = 4 units; 68-82 min = 5 units   Total Total     [x]  Patient Education billed concurrently with other procedures   [x] Review HEP    [] Progressed/Changed HEP, detail:    [] Other detail:       Objective Information/Functional Measures/Assessment    Pt reports to first skilled therapy session since initial evaluation with decreased

## 2023-04-13 ENCOUNTER — HOSPITAL ENCOUNTER (OUTPATIENT)
Facility: HOSPITAL | Age: 63
Setting detail: RECURRING SERIES
Discharge: HOME | End: 2023-04-16
Payer: COMMERCIAL

## 2023-04-13 PROCEDURE — 97110 THERAPEUTIC EXERCISES: CPT

## 2023-04-13 PROCEDURE — 97112 NEUROMUSCULAR REEDUCATION: CPT

## 2023-04-17 ENCOUNTER — HOSPITAL ENCOUNTER (OUTPATIENT)
Facility: HOSPITAL | Age: 63
Setting detail: RECURRING SERIES
Discharge: HOME | End: 2023-04-20
Payer: COMMERCIAL

## 2023-04-17 PROCEDURE — 97110 THERAPEUTIC EXERCISES: CPT

## 2023-04-17 PROCEDURE — 97140 MANUAL THERAPY 1/> REGIONS: CPT

## 2023-04-17 PROCEDURE — 97112 NEUROMUSCULAR REEDUCATION: CPT

## 2023-04-17 NOTE — PROGRESS NOTES
[facility]ILE[facility]   4/24/2023 10:20 AM Vianey Paiz [facility]   4/27/2023 10:20 AM Kori Wiley [facility]   5/25/2023 10:00 AM [facility] AMB   6/8/2023 10:00 AM Gamaliel Mack MD [facility] AMB

## 2023-04-20 ENCOUNTER — HOSPITAL ENCOUNTER (OUTPATIENT)
Facility: HOSPITAL | Age: 63
Setting detail: RECURRING SERIES
Discharge: HOME | End: 2023-04-23
Payer: COMMERCIAL

## 2023-04-20 PROCEDURE — 97112 NEUROMUSCULAR REEDUCATION: CPT

## 2023-04-20 PROCEDURE — 97110 THERAPEUTIC EXERCISES: CPT

## 2023-04-24 ENCOUNTER — HOSPITAL ENCOUNTER (OUTPATIENT)
Facility: HOSPITAL | Age: 63
Setting detail: RECURRING SERIES
Discharge: HOME | End: 2023-04-27
Payer: COMMERCIAL

## 2023-04-24 PROCEDURE — 97112 NEUROMUSCULAR REEDUCATION: CPT

## 2023-04-24 PROCEDURE — 97110 THERAPEUTIC EXERCISES: CPT

## 2023-04-24 NOTE — PROGRESS NOTES
[address] [facility] AT [address], [facility] [address]  Phone: [phone] Fax: [phone]  PROGRESS NOTE  Patient Name: Adama Florez : 1960   Treatment/Medical Diagnosis: Other low back pain [M54.59]   Referral Source: Damaso Zafar DO     Payor: Payor: Oris Standing / Plan: Freddy Rosario / Product Type: *No Product type* /            Date of Initial Visit: 3/27/23 Attended Visits: 8 Missed Visits: 0   Reporting Period: 3/27/23 - 23       CURRENT GOAL STATUS  1) Patient will be independent with performing daily initial HEP. Status at last certification/assessment: established and reviewed   Current: initiated (4/3/2023) pt reports she is performing when she doesn't come to therapy (4/10/23) met   2) Patient will demosntrate pelvic alignment WNL for 3 consecutive visits for ease with ADL's, standing, walking  Status at last assessment: left anterior pelvic obliquity   Current: no pelvic misalignment (4/3/2023) left anterior pelvic obliquity (4/10/23) left anterior pelvic obliquity (23) not met   Long Term Goals: To be accomplished in  15  treatments:  1) Patient  will be independent  with comprehensive updated HEP to continue to manage care independently after discharge. Status at last certification/assessment: established and reviewed   Current: not yet updated, will plan to update next visit (23) goal in progress   2) Patient will increase left hip flexion strength to 5/5 to increase ability to perform standing, walking. Status at last certification/assessment:   Current: Progressing: Left 4+/5 (2023) 4+/5 (23) progressing   3) Increase FOTO to 61 indicating improved function and quality of life. Status at last certification/assessment: 42  Current: 61 (23) met   4)Patient will report 75% improvement in low back and left LE pain to improve quality of life.   Status at last

## 2023-04-24 NOTE — PROGRESS NOTES
PHYSICAL / OCCUPATIONAL THERAPY - DAILY TREATMENT NOTE (updated )    Patient Name: Stephane Osborne    Date: 2023    : 1960  Insurance: Payor: Semaj  / Plan: [employer] / Product Type: *No Product type* /      Patient  verified Yes     Visit #   Current / Total 8 15   Time   In / Out 1022 am 1113 am   Pain   In / Out 2 5   Subjective Functional Status/Changes: Pt reports she did 20 min workout on bike and 5 min cool down, then 20 min on the TM and 5 min cool down    Changes to:  Meds, Allergies, Med Hx, Sx Hx? If yes, update Summary List no       TREATMENT AREA =  Other low back pain [M54.59]    OBJECTIVE    Modalities Rationale:     decrease pain and increase tissue extensibility to improve patient's ability to progress to PLOF and address remaining functional goals. min [] Estim Unattended, type/location:                                      []  w/ice    []  w/heat    min [] Estim Attended, type/location:                                     []  w/US     []  w/ice    []  w/heat    []  TENS insruct      min []  Mechanical Traction: type/lbs                   []  pro   []  sup   []  int   []  cont    []  before manual    []  after manual    min []  Ultrasound, settings/location:      min []  Iontophoresis w/ dexamethasone, location:                                               []  take home patch       []  in clinic   10' min  unbill []  Ice     [x]  Heat    location/position: Left hip     min []  Paraffin,  details:     min []  Vasopneumatic Device, press/temp:     min []  Guanaco Zuniga / Tavia Ashraf: If using vaso (only need to measure limb vaso being performed on)      pre-treatment girth :       post-treatment girth :       measured at (landmark location) :      min []  Other:    Skin assessment post-treatment (if applicable):    []  intact    []  redness- no adverse reaction                 []redness - adverse reaction:         Therapeutic Procedures:     Tx Min Billable or 1:1

## 2023-04-27 ENCOUNTER — HOSPITAL ENCOUNTER (OUTPATIENT)
Facility: HOSPITAL | Age: 63
Setting detail: RECURRING SERIES
Discharge: HOME | End: 2023-04-30
Payer: COMMERCIAL

## 2023-04-27 PROCEDURE — 97110 THERAPEUTIC EXERCISES: CPT

## 2023-04-27 PROCEDURE — 97112 NEUROMUSCULAR REEDUCATION: CPT

## 2023-05-01 ENCOUNTER — HOSPITAL ENCOUNTER (OUTPATIENT)
Facility: HOSPITAL | Age: 63
Setting detail: RECURRING SERIES
Discharge: HOME | End: 2023-05-04
Payer: COMMERCIAL

## 2023-05-01 PROCEDURE — 97112 NEUROMUSCULAR REEDUCATION: CPT

## 2023-05-01 PROCEDURE — 97110 THERAPEUTIC EXERCISES: CPT

## 2023-05-01 NOTE — PROGRESS NOTES
1:1 Min (if diff from Tx Min) Procedure, Rationale, Specifics   15 15 [id number] Therapeutic Exercise (timed):  increase ROM, strength, coordination, balance, and proprioception to improve patient's ability to progress to PLOF and address remaining functional goals. (see flow sheet as applicable)     Details if applicable:       25 25 [id number] Neuromuscular Re-Education (timed):  improve balance, coordination, kinesthetic sense, posture, core stability and proprioception to improve patient's ability to develop conscious control of individual muscles and awareness of position of extremities in order to progress to PLOF and address remaining functional goals. (see flow sheet as applicable)     Details if applicable:                    36 40 [facility] Totals Reminder: bill using total billable min of TIMED therapeutic procedures (example: do not include dry needle or estim unattended, both untimed codes, in totals to left)  8-22 min = 1 unit; 23-37 min = 2 units; 38-52 min = 3 units; 53-67 min = 4 units; 68-82 min = 5 units   Total Total     [x]  Patient Education billed concurrently with other procedures   [x] Review HEP    [] Progressed/Changed HEP, detail:    [] Other detail:       Objective Information/Functional Measures/Assessment    Pt reports to skilled therapy session with decreased functional LE strength and Rom as well as poor standing/ walking tolerance. Pt did not demonstrate any pelvic misalignment at the start of session and is making progress towards her Ltgs. She was able to increase repetitions during SLRs and side lying hip abduction to improve ease with daily tasks. Therapist provided cues throughout session with patient demonstrating proper sequencing and technique. Session tolerated well.      Patient will continue to benefit from skilled PT / OT services to modify and progress therapeutic interventions, analyze and address functional mobility deficits, analyze and address ROM deficits, analyze and address

## 2023-05-08 ENCOUNTER — HOSPITAL ENCOUNTER (OUTPATIENT)
Facility: HOSPITAL | Age: 63
Setting detail: RECURRING SERIES
Discharge: HOME | End: 2023-05-11
Payer: COMMERCIAL

## 2023-05-08 PROCEDURE — 97112 NEUROMUSCULAR REEDUCATION: CPT

## 2023-05-08 PROCEDURE — 97110 THERAPEUTIC EXERCISES: CPT

## 2023-05-08 NOTE — PROGRESS NOTES
PHYSICAL / OCCUPATIONAL THERAPY - DAILY TREATMENT NOTE (updated )    Patient Name: Kristi Tai    Date: 2023    : 1960  Insurance: Payor: Alaina Hart / Plan: OPT[employer]AL / Product Type: *No Product type* /      Patient  verified Yes     Visit #   Current / Total 3 8   Time   In / Out 1519 pm 1601 pm   Pain   In / Out 0 /10 2.5 /10   Subjective Functional Status/Changes: Pt reports continued throbbing in left LE    Changes to:  Meds, Allergies, Med Hx, Sx Hx? If yes, update Summary List  no     TREATMENT AREA =  Other low back pain [M54.59]    OBJECTIVE    Modalities Rationale:     decrease inflammation and decrease pain to improve patient's ability to progress to PLOF and address remaining functional goals. min [] Estim Unattended, type/location:                                      []  w/ice    []  w/heat    min [] Estim Attended, type/location:                                     []  w/US     []  w/ice    []  w/heat    []  TENS insruct      min []  Mechanical Traction: type/lbs                   []  pro   []  sup   []  int   []  cont    []  before manual    []  after manual    min []  Ultrasound, settings/location:      min []  Iontophoresis w/ dexamethasone, location:                                               []  take home patch       []  in clinic   PD min  unbill []  Ice     []  Heat    location/position: Reclined  Lower back. min []  Paraffin,  details:     min []  Vasopneumatic Device, press/temp:     min []  Donnie Sandhu / Maverick Plummer: If using vaso (only need to measure limb vaso being performed on)      pre-treatment girth :       post-treatment girth :       measured at (landmark location) :      min []  Other:    Skin assessment post-treatment (if applicable):    [x]  intact    []  redness- no adverse reaction                 []redness - adverse reaction:         Therapeutic Procedures:     Tx Min Billable or 1:1 Min (if diff from [employer]) Procedure, Rationale,

## 2023-05-11 ENCOUNTER — HOSPITAL ENCOUNTER (OUTPATIENT)
Facility: HOSPITAL | Age: 63
Setting detail: RECURRING SERIES
Discharge: HOME | End: 2023-05-14
Payer: COMMERCIAL

## 2023-05-11 PROCEDURE — 97112 NEUROMUSCULAR REEDUCATION: CPT

## 2023-05-11 PROCEDURE — 97140 MANUAL THERAPY 1/> REGIONS: CPT

## 2023-05-11 PROCEDURE — 97110 THERAPEUTIC EXERCISES: CPT

## 2023-05-15 ENCOUNTER — HOSPITAL ENCOUNTER (OUTPATIENT)
Facility: HOSPITAL | Age: 63
Setting detail: RECURRING SERIES
Discharge: HOME | End: 2023-05-18
Payer: COMMERCIAL

## 2023-05-15 PROCEDURE — 97112 NEUROMUSCULAR REEDUCATION: CPT

## 2023-05-15 PROCEDURE — 97110 THERAPEUTIC EXERCISES: CPT

## 2023-05-15 NOTE — PROGRESS NOTES
PHYSICAL / OCCUPATIONAL THERAPY - DAILY TREATMENT NOTE (updated )    Patient Name: Radha Skelton    Date: 5/15/2023    : 1960  Insurance: Payor: OPTIMA / Plan: Stephen Arguello / Product Type: *No Product type* /      Patient  verified Yes     Visit #   Current / Total 5 8   Time   In / Out 10:21 10:10   Pain   In / Out 2/10 2/10   Subjective Functional Status/Changes: The majority of my pain is in the front of my right hip   Changes to:  Meds, Allergies, Med Hx, Sx Hx? If yes, update Summary List no       TREATMENT AREA =  Other low back pain [M54.59]    OBJECTIVE  Therapeutic Procedures: Tx Min Billable or 1:1 Min (if diff from Tx Min) Procedure, Rationale, Specifics   24 24 [id number] Therapeutic Exercise (timed):  increase ROM, strength, coordination, balance, and proprioception to improve patient's ability to progress to PLOF and address remaining functional goals. (see flow sheet as applicable)     Details if applicable:        55163 Neuromuscular Re-Education (timed):  improve balance, coordination, kinesthetic sense, posture, core stability and proprioception to improve patient's ability to develop conscious control of individual muscles and awareness of position of extremities in order to progress to PLOF and address remaining functional goals.  (see flow sheet as applicable)     Details if applicable:                    52 49 [facility] Totals Reminder: bill using total billable min of TIMED therapeutic procedures (example: do not include dry needle or estim unattended, both untimed codes, in totals to left)  8-22 min = 1 unit; 23-37 min = 2 units; 38-52 min = 3 units; 53-67 min = 4 units; 68-82 min = 5 units   Total Total     [x]  Patient Education billed concurrently with other procedures   [x] Review HEP    [] Progressed/Changed HEP, detail:    [] Other detail:       Objective Information/Functional Measures/Assessment    Pt reports to skilled therapy session with increased left

## 2023-05-18 ENCOUNTER — HOSPITAL ENCOUNTER (OUTPATIENT)
Facility: HOSPITAL | Age: 63
Setting detail: RECURRING SERIES
Discharge: HOME | End: 2023-05-21
Payer: COMMERCIAL

## 2023-05-18 PROCEDURE — 97112 NEUROMUSCULAR REEDUCATION: CPT

## 2023-05-18 PROCEDURE — 97110 THERAPEUTIC EXERCISES: CPT

## 2023-05-25 ENCOUNTER — NURSE ONLY (OUTPATIENT)
Age: 63
End: 2023-05-25

## 2023-05-25 DIAGNOSIS — D05.10 DUCTAL CARCINOMA IN SITU (DCIS) OF BREAST, UNSPECIFIED LATERALITY: Primary | ICD-10-CM

## 2023-05-25 DIAGNOSIS — D64.9 ANEMIA, UNSPECIFIED TYPE: ICD-10-CM

## 2023-05-27 LAB
A/G RATIO: 1.3 RATIO (ref 1.1–2.6)
ALBUMIN SERPL-MCNC: 4.2 G/DL (ref 3.5–5)
ALP BLD-CCNC: 89 U/L (ref 40–120)
ALT SERPL-CCNC: 26 U/L (ref 5–40)
ANION GAP SERPL CALCULATED.3IONS-SCNC: 13 MMOL/L (ref 3–15)
AST SERPL-CCNC: 25 U/L (ref 10–37)
BASOPHILS # BLD: 1 % (ref 0–2)
BASOPHILS ABSOLUTE: 0 K/UL (ref 0–0.2)
BILIRUB SERPL-MCNC: 0.3 MG/DL (ref 0.2–1.2)
BUN BLDV-MCNC: 16 MG/DL (ref 6–22)
CA 27.29: 27.4 U/ML (ref 0–38.6)
CALCIUM SERPL-MCNC: 9.6 MG/DL (ref 8.4–10.5)
CHLORIDE BLD-SCNC: 101 MMOL/L (ref 98–110)
CO2: 25 MMOL/L (ref 20–32)
CREAT SERPL-MCNC: 0.8 MG/DL (ref 0.8–1.4)
EOSINOPHIL # BLD: 7 % (ref 0–6)
EOSINOPHILS ABSOLUTE: 0.4 K/UL (ref 0–0.5)
FERRITIN: 933 NG/ML (ref 10–291)
GLOBULIN: 3.2 G/DL (ref 2–4)
GLOMERULAR FILTRATION RATE: >60 ML/MIN/1.73 SQ.M.
GLUCOSE: 64 MG/DL (ref 70–99)
HCT VFR BLD CALC: 41.4 % (ref 35.1–48)
HEMOGLOBIN: 13.1 G/DL (ref 11.7–16)
LYMPHOCYTES # BLD: 42 % (ref 20–45)
LYMPHOCYTES ABSOLUTE: 2.6 K/UL (ref 1–4.8)
MCH RBC QN AUTO: 25 PG (ref 26–34)
MCHC RBC AUTO-ENTMCNC: 32 G/DL (ref 31–36)
MCV RBC AUTO: 79 FL (ref 80–99)
MONOCYTES ABSOLUTE: 0.6 K/UL (ref 0.1–1)
MONOCYTES: 9 % (ref 3–12)
NEUTROPHILS ABSOLUTE: 2.6 K/UL (ref 1.8–7.7)
NEUTROPHILS: 42 % (ref 40–75)
PDW BLD-RTO: 15.4 % (ref 10–15.5)
PLATELET # BLD: 261 K/UL (ref 140–440)
PMV BLD AUTO: 10.7 FL (ref 9–13)
POTASSIUM SERPL-SCNC: 4.1 MMOL/L (ref 3.5–5.5)
RBC: 5.24 M/UL (ref 3.8–5.2)
SODIUM BLD-SCNC: 139 MMOL/L (ref 133–145)
TOTAL PROTEIN: 7.4 G/DL (ref 6.2–8.1)
WBC: 6.3 K/UL (ref 4–11)

## 2023-06-07 DIAGNOSIS — G62.9 NEUROPATHY: ICD-10-CM

## 2023-06-07 DIAGNOSIS — D05.12 DUCTAL CARCINOMA IN SITU (DCIS) OF LEFT BREAST: ICD-10-CM

## 2023-06-07 DIAGNOSIS — G62.0 CHEMOTHERAPY-INDUCED PERIPHERAL NEUROPATHY (HCC): ICD-10-CM

## 2023-06-07 DIAGNOSIS — D05.10 DUCTAL CARCINOMA IN SITU (DCIS) OF BREAST, UNSPECIFIED LATERALITY: ICD-10-CM

## 2023-06-07 DIAGNOSIS — T45.1X5A CHEMOTHERAPY-INDUCED PERIPHERAL NEUROPATHY (HCC): ICD-10-CM

## 2023-06-07 DIAGNOSIS — D64.9 CHRONIC ANEMIA: ICD-10-CM

## 2023-06-07 DIAGNOSIS — C50.919 TRIPLE NEGATIVE MALIGNANT NEOPLASM OF BREAST (HCC): ICD-10-CM

## 2023-06-08 ENCOUNTER — OFFICE VISIT (OUTPATIENT)
Age: 63
End: 2023-06-08
Payer: COMMERCIAL

## 2023-06-08 VITALS
HEART RATE: 54 BPM | WEIGHT: 218.2 LBS | DIASTOLIC BLOOD PRESSURE: 84 MMHG | RESPIRATION RATE: 16 BRPM | OXYGEN SATURATION: 99 % | SYSTOLIC BLOOD PRESSURE: 125 MMHG | BODY MASS INDEX: 37.25 KG/M2 | HEIGHT: 64 IN

## 2023-06-08 DIAGNOSIS — D05.10 DUCTAL CARCINOMA IN SITU (DCIS) OF BREAST, UNSPECIFIED LATERALITY: Primary | ICD-10-CM

## 2023-06-08 DIAGNOSIS — G62.0 CHEMOTHERAPY-INDUCED PERIPHERAL NEUROPATHY (HCC): ICD-10-CM

## 2023-06-08 DIAGNOSIS — D64.9 CHRONIC ANEMIA: ICD-10-CM

## 2023-06-08 DIAGNOSIS — C50.919 TRIPLE NEGATIVE BREAST CANCER (HCC): ICD-10-CM

## 2023-06-08 DIAGNOSIS — T45.1X5A CHEMOTHERAPY-INDUCED PERIPHERAL NEUROPATHY (HCC): ICD-10-CM

## 2023-06-08 DIAGNOSIS — G89.4 CHRONIC PAIN SYNDROME: ICD-10-CM

## 2023-06-08 DIAGNOSIS — M19.90 ARTHRITIS: ICD-10-CM

## 2023-06-08 PROCEDURE — 3079F DIAST BP 80-89 MM HG: CPT | Performed by: NURSE PRACTITIONER

## 2023-06-08 PROCEDURE — 99213 OFFICE O/P EST LOW 20 MIN: CPT | Performed by: NURSE PRACTITIONER

## 2023-06-08 PROCEDURE — 3074F SYST BP LT 130 MM HG: CPT | Performed by: NURSE PRACTITIONER

## 2023-06-08 ASSESSMENT — ENCOUNTER SYMPTOMS
SHORTNESS OF BREATH: 0
BACK PAIN: 0
DIARRHEA: 0
NAUSEA: 0
CHEST TIGHTNESS: 0
ABDOMINAL PAIN: 0
VOMITING: 0
WHEEZING: 0
BLOOD IN STOOL: 0
COLOR CHANGE: 0
CONSTIPATION: 0

## 2023-06-08 ASSESSMENT — PATIENT HEALTH QUESTIONNAIRE - PHQ9
SUM OF ALL RESPONSES TO PHQ9 QUESTIONS 1 & 2: 0
SUM OF ALL RESPONSES TO PHQ QUESTIONS 1-9: 0
1. LITTLE INTEREST OR PLEASURE IN DOING THINGS: 0
SUM OF ALL RESPONSES TO PHQ QUESTIONS 1-9: 0
2. FEELING DOWN, DEPRESSED OR HOPELESS: 0
SUM OF ALL RESPONSES TO PHQ QUESTIONS 1-9: 0
SUM OF ALL RESPONSES TO PHQ QUESTIONS 1-9: 0

## 2023-06-08 NOTE — PROGRESS NOTES
HEMATOLOGY/MEDICAL ONCOLOGY PROGRESS NOTE    NAME: Arleth Dia  : 1960  DATE: 23    PCP: Priscilla Aguilar DO    Ms. Hudson Jacobs is a 58 y.o.  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     --B/L Mastectomy 2014  --The patient completed adjuvant systemic chemotherapy  --Presently on surveillance     SUBJECTIVE  Ms. Arleth Dia is a 61 y.o. female who was seen for follow up. 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 She underwent B/L Mastectomy in . She states she has been doing well since her last office visit. She denies any fevers, chills, recurrent infections, and skin rash. She denies any shortness of breath, dizziness, chest pain, fatigue, weakness, and palpitations. She denies any abdominal pain, nausea, vomiting, diarrhea, or constipation. She denies bleeding or bruising. She denies pain or any discomfort. She does not have any concerns or complaints to report at this time.     Past Medical History, Surgical History, Family History, and Social History reviewed and remain unchanged  Past Medical History:   Diagnosis Date    Arthritis     Cancer ([facility] [address]     right breast cancer     DDD (degenerative disc disease), cervical 2017    Hypercholesterolemia     Hypertension     Neuropathy      Past Surgical History:   Procedure Laterality Date    BREAST RECONSTRUCTION  2014    BILATERAL BREAST RECONSTRUCTION WITH TISSUE EXPANDERS performed by Annie Owens MD at [facility]      COLONOSCOPY N/A 2021    COLONOSCOPY performed by Dilma Montelongo MD at [address]       fibroid tumors removed    MASTECTOMY  2014    BILATERAL BREAST MASTECTOMY SIMPLE performed by Alejandro Archibald MD at [facility]      left chest pac- removed      Social

## 2023-12-08 DIAGNOSIS — G89.4 CHRONIC PAIN SYNDROME: ICD-10-CM

## 2023-12-08 DIAGNOSIS — C50.919 TRIPLE NEGATIVE BREAST CANCER (HCC): ICD-10-CM

## 2023-12-08 DIAGNOSIS — M19.90 ARTHRITIS: ICD-10-CM

## 2023-12-08 DIAGNOSIS — G62.0 CHEMOTHERAPY-INDUCED PERIPHERAL NEUROPATHY (HCC): ICD-10-CM

## 2023-12-08 DIAGNOSIS — T45.1X5A CHEMOTHERAPY-INDUCED PERIPHERAL NEUROPATHY (HCC): ICD-10-CM

## 2023-12-08 DIAGNOSIS — D05.10 DUCTAL CARCINOMA IN SITU (DCIS) OF BREAST, UNSPECIFIED LATERALITY: ICD-10-CM

## 2023-12-08 DIAGNOSIS — D64.9 CHRONIC ANEMIA: ICD-10-CM

## 2024-06-25 ENCOUNTER — TRANSCRIBE ORDERS (OUTPATIENT)
Facility: HOSPITAL | Age: 64
End: 2024-06-25

## 2024-06-25 DIAGNOSIS — R60.0 LOCALIZED EDEMA: Primary | ICD-10-CM

## 2024-06-28 ENCOUNTER — HOSPITAL ENCOUNTER (OUTPATIENT)
Facility: HOSPITAL | Age: 64
Discharge: HOME | End: 2024-06-28
Payer: COMMERCIAL

## 2024-06-28 DIAGNOSIS — R60.0 LOCALIZED EDEMA: ICD-10-CM

## 2024-06-28 PROCEDURE — 93971 EXTREMITY STUDY: CPT

## 2024-06-29 PROCEDURE — 93971 EXTREMITY STUDY: CPT | Performed by: INTERNAL MEDICINE

## (undated) DEVICE — SYR 50ML SLIP TIP NSAF LF STRL --

## (undated) DEVICE — GAUZE,SPONGE,4"X4",16PLY,STRL,LF,10/TRAY: Brand: MEDLINE

## (undated) DEVICE — FLUFF AND POLYMER UNDERPAD,EXTRA HEAVY: Brand: WINGS

## (undated) DEVICE — ENDOSCOPY PUMP TUBING/ CAP SET: Brand: ERBE

## (undated) DEVICE — SYRINGE MED 25GA 3ML L5/8IN SUBQ PLAS W/ DETACH NDL SFTY

## (undated) DEVICE — GOWN ISOL IMPERV UNIV, DISP, OPEN BACK, BLUE --

## (undated) DEVICE — SOLUTION IRRIG 1000ML H2O STRL BLT

## (undated) DEVICE — MEDI-VAC SUCTION HIGH CAPACITY: Brand: CARDINAL HEALTH

## (undated) DEVICE — CATHETER SUCT TR FL TIP 14FR W/ O CTRL

## (undated) DEVICE — SYR 20ML LL STRL LF --

## (undated) DEVICE — CANNULA ORIG TL CLR W FOAM CUSHIONS AND 14FT SUPL TB 3 CHN

## (undated) DEVICE — MEDI-VAC NON-CONDUCTIVE SUCTION TUBING: Brand: CARDINAL HEALTH

## (undated) DEVICE — FLEX ADVANTAGE 3000CC: Brand: FLEX ADVANTAGE

## (undated) DEVICE — SYR 10ML LUER LOK 1/5ML GRAD --

## (undated) DEVICE — AIRLIFE™ NASAL OXYGEN CANNULA CURVED, NONFLARED TIP WITH 14 FOOT (4.3 M) CRUSH-RESISTANT TUBING, OVER-THE-EAR STYLE: Brand: AIRLIFE™